# Patient Record
Sex: FEMALE | Race: BLACK OR AFRICAN AMERICAN | Employment: FULL TIME | URBAN - METROPOLITAN AREA
[De-identification: names, ages, dates, MRNs, and addresses within clinical notes are randomized per-mention and may not be internally consistent; named-entity substitution may affect disease eponyms.]

---

## 2017-01-16 DIAGNOSIS — F98.8 ADD (ATTENTION DEFICIT DISORDER): ICD-10-CM

## 2017-01-16 RX ORDER — DEXTROAMPHETAMINE SACCHARATE, AMPHETAMINE ASPARTATE, DEXTROAMPHETAMINE SULFATE AND AMPHETAMINE SULFATE 5; 5; 5; 5 MG/1; MG/1; MG/1; MG/1
20 TABLET ORAL 2 TIMES DAILY
Qty: 60 TAB | Refills: 0 | Status: SHIPPED | OUTPATIENT
Start: 2017-01-16 | End: 2017-02-20 | Stop reason: SDUPTHER

## 2017-01-16 NOTE — LETTER
1/16/2017 9:46 AM 
 
Ms. Asad Keene [address] [phone] [id number] Effective January 1, 4346 a new policy will be implemented by [address] [place] [address] for patients who are receiving controlled stimulant medications (ie Adderall, Vyvanse, Ritalin, etc). While these medications are intended to help individuals with ADD and ADHD manage their symptoms, they have potential negative side effects. These side effects may include but are not limited to a rapid heart rate, high blood pressure, weight loss, dependency and abuse. Since these medications are regulated by the Drug Enforcement Agency [facility]) and given a rise in abuse of the medications across the nation, we are obligated to monitor the use of these medications more closely. To continue ADD management in the clinic, each patient must have completed neuropsychological testing by a psychiatrist or psychologist and have included as part of their medical record. Individuals who were diagnosed prior to age 25 will have to be retested as an adult. In addition, all patients receiving controlled medications will be required to sign a controlled substance agreement. This is an agreement between patient and provider to ensure compliance with treatment. It also includes urine drug screens, maximum dosing limits for medications and follow up appointments every three months for prescription refills. We understand these changes may be inconvenient or frustrating, but they follow current clinical and CDC guidelines and are intended to improve patient care and your safety. Please address any questions or concerns with your provider at your next visit. Thank you for entrusting your healthcare in our hands. [address] [place] [address] Providers Sincerely, 
 
 
Higinio Pond NP

## 2017-01-16 NOTE — TELEPHONE ENCOUNTER
Margaret Paulson, [facility] is requesting a refill of adderall. 72 hour rule was given.

## 2017-02-20 DIAGNOSIS — F98.8 ADD (ATTENTION DEFICIT DISORDER): ICD-10-CM

## 2017-02-20 RX ORDER — DEXTROAMPHETAMINE SACCHARATE, AMPHETAMINE ASPARTATE, DEXTROAMPHETAMINE SULFATE AND AMPHETAMINE SULFATE 5; 5; 5; 5 MG/1; MG/1; MG/1; MG/1
20 TABLET ORAL 2 TIMES DAILY
Qty: 60 TAB | Refills: 0 | Status: SHIPPED | OUTPATIENT
Start: 2017-02-20 | End: 2017-03-17 | Stop reason: SDUPTHER

## 2017-02-20 NOTE — TELEPHONE ENCOUNTER
----- Message from [employer] sent at 2/20/2017  9:34 AM EST -----  Regarding: MALGORZATA Klein   Pt is requesting prescription for Adderall, she stated she is completely out. The best contact number is [phone].

## 2017-03-17 ENCOUNTER — TELEPHONE (OUTPATIENT)
Dept: FAMILY MEDICINE CLINIC | Age: 27
End: 2017-03-17

## 2017-03-17 DIAGNOSIS — F98.8 ADD (ATTENTION DEFICIT DISORDER): ICD-10-CM

## 2017-03-17 NOTE — TELEPHONE ENCOUNTER
Refill request:   Requested Prescriptions     Pending Prescriptions Disp Refills    dextroamphetamine-amphetamine (ADDERALL) 20 mg tablet 60 Tab 0     Sig: Take 1 Tab (20 mg total) by mouth two (2) times a day.

## 2017-03-17 NOTE — TELEPHONE ENCOUNTER
Addie Garcia       [phone]    -  Patient called back stated she needs her medication , next avail appt is 4-3-2017 -   Patient would not accept that date-  Can she someone else to get her Adderall

## 2017-03-20 RX ORDER — DEXTROAMPHETAMINE SACCHARATE, AMPHETAMINE ASPARTATE, DEXTROAMPHETAMINE SULFATE AND AMPHETAMINE SULFATE 5; 5; 5; 5 MG/1; MG/1; MG/1; MG/1
20 TABLET ORAL 2 TIMES DAILY
Qty: 60 TAB | Refills: 0 | Status: SHIPPED | OUTPATIENT
Start: 2017-03-20 | End: 2017-04-10 | Stop reason: SDUPTHER

## 2017-03-21 DIAGNOSIS — F98.8 ADD (ATTENTION DEFICIT DISORDER): ICD-10-CM

## 2017-03-21 RX ORDER — DEXTROAMPHETAMINE SACCHARATE, AMPHETAMINE ASPARTATE, DEXTROAMPHETAMINE SULFATE AND AMPHETAMINE SULFATE 5; 5; 5; 5 MG/1; MG/1; MG/1; MG/1
20 TABLET ORAL 2 TIMES DAILY
Qty: 60 TAB | Refills: 0 | Status: CANCELLED | OUTPATIENT
Start: 2017-03-21

## 2017-03-21 NOTE — TELEPHONE ENCOUNTER
[facility]  [phone]    Patient has scheduled an appointment with MALGORZATA Huizar on 04/06/17. She is requesting that her adderral prescription be approved.

## 2017-04-10 ENCOUNTER — OFFICE VISIT (OUTPATIENT)
Dept: FAMILY MEDICINE CLINIC | Age: 27
End: 2017-04-10

## 2017-04-10 VITALS
TEMPERATURE: 97.8 F | HEIGHT: 64 IN | DIASTOLIC BLOOD PRESSURE: 91 MMHG | SYSTOLIC BLOOD PRESSURE: 145 MMHG | WEIGHT: 238.2 LBS | RESPIRATION RATE: 18 BRPM | OXYGEN SATURATION: 100 % | BODY MASS INDEX: 40.67 KG/M2 | HEART RATE: 92 BPM

## 2017-04-10 DIAGNOSIS — F90.9 ATTENTION DEFICIT HYPERACTIVITY DISORDER (ADHD), UNSPECIFIED ADHD TYPE: Primary | ICD-10-CM

## 2017-04-10 DIAGNOSIS — F41.1 GAD (GENERALIZED ANXIETY DISORDER): ICD-10-CM

## 2017-04-10 DIAGNOSIS — G43.109 MIGRAINE WITH AURA AND WITHOUT STATUS MIGRAINOSUS, NOT INTRACTABLE: ICD-10-CM

## 2017-04-10 RX ORDER — DEXTROAMPHETAMINE SACCHARATE, AMPHETAMINE ASPARTATE, DEXTROAMPHETAMINE SULFATE AND AMPHETAMINE SULFATE 5; 5; 5; 5 MG/1; MG/1; MG/1; MG/1
20 TABLET ORAL 2 TIMES DAILY
Qty: 60 TAB | Refills: 0 | Status: SHIPPED | OUTPATIENT
Start: 2017-04-10 | End: 2017-05-19 | Stop reason: SDUPTHER

## 2017-04-10 RX ORDER — NORTRIPTYLINE HYDROCHLORIDE 10 MG/1
10 CAPSULE ORAL
Qty: 30 CAP | Refills: 5 | Status: SHIPPED | OUTPATIENT
Start: 2017-04-10 | End: 2017-11-02 | Stop reason: SINTOL

## 2017-04-10 NOTE — MR AVS SNAPSHOT
Visit Information Date & Time Provider Department Dept. Phone Encounter #  
 4/10/2017  5:15 PM Lamonte Delvalle, [address] [phone] [id number] Upcoming Health Maintenance Date Due Pneumococcal 19-64 Medium Risk (1 of 1 - PPSV23) 4/3/2009 DTaP/Tdap/Td series (1 - Tdap) 4/3/2011 PAP AKA CERVICAL CYTOLOGY 9/26/2019 Allergies as of 4/10/2017  Review Complete On: 4/10/2017 By: Lamonte Delvalle NP Severity Noted Reaction Type Reactions Codeine  09/27/2012    Shortness of Breath, Nausea and Vomiting  
 sweats Current Immunizations  Never Reviewed No immunizations on file. Not reviewed this visit You Were Diagnosed With   
  
 Codes Comments Attention deficit hyperactivity disorder (ADHD), unspecified ADHD type    -  Primary ICD-10-CM: F90.9 ICD-9-CM: 314.01 Migraine with aura and without status migrainosus, not intractable     ICD-10-CM: G43.109 ICD-9-CM: 346.00   
 JAQUAN (generalized anxiety disorder)     ICD-10-CM: F41.1 ICD-9-CM: 300.02 Vitals BP Pulse Temp Resp Height(growth percentile) Weight(growth percentile) (!) 145/91 (BP 1 Location: Left arm, BP Patient Position: Sitting) 92 97.8 °F (36.6 °C) (Oral) 18 5' 4\" (1.626 m) 238 lb 3.2 oz (108 kg) LMP SpO2 BMI OB Status Smoking Status 04/05/2017 (Exact Date) 100% 40.89 kg/m2 Having regular periods Current Some Day Smoker Vitals History BMI and BSA Data Body Mass Index Body Surface Area  
 40.89 kg/m 2 2.21 m 2 Preferred Pharmacy Pharmacy Name Phone [id number], [address] [facility] [phone] Your Updated Medication List  
  
   
This list is accurate as of: 4/10/17  5:55 PM.  Always use your most recent med list.  
  
  
  
  
 aluminum chloride 20 % external solution Commonly known as:  HYPERCARE Apply to affected areas twice daily as needed butalbital-acetaminophen-caffeine -40 mg per tablet Commonly known as:  FIORICET, ESGIC  
TAKE 1 TABLET BY MOUTH EVERY 4 HOURS AS NEEDED FOR PAIN  
  
 dextroamphetamine-amphetamine 20 mg tablet Commonly known as:  ADDERALL Take 1 Tab (20 mg total) by mouth two (2) times a day. Do not fill before 4/20/17. fluconazole 150 mg tablet Commonly known as:  DIFLUCAN  
TAKE 1 TABLET ONE TIME DAILY FOR 1 DAY MULTI-VITAMIN PO Take  by mouth daily. norgestimate-ethinyl estradiol 0.18/0.215/0.25 mg-35 mcg (28) Tab Commonly known as:  TRI-SPRINTEC (28) Take 1 Tab by mouth daily. nortriptyline 10 mg capsule Commonly known as:  PAMELOR Take 1 Cap by mouth nightly. Prescriptions Printed Refills  
 dextroamphetamine-amphetamine (ADDERALL) 20 mg tablet 0 Sig: Take 1 Tab (20 mg total) by mouth two (2) times a day. Do not fill before 4/20/17. Class: Print Route: Oral  
  
Prescriptions Sent to Pharmacy Refills  
 nortriptyline (PAMELOR) 10 mg capsule 5 Sig: Take 1 Cap by mouth nightly. Class: Normal  
 Pharmacy: [address] [place] [address].  #: [phone] Route: Oral  
  
We Performed the Following [address] [place] [address] MONITORING [[id number] Custom] Introducing [facility] & HEALTH SERVICES! [facility] introduces [employer] patient portal. Now you can access parts of your medical record, email your doctor's office, and request medication refills online. 1. In your internet browser, go to [email or web address] 2. Click on the First Time User? Click Here link in the Sign In box. You will see the New Member Sign Up page. 3. Enter your [employer] Access Code exactly as it appears below. You will not need to use this code after youve completed the sign-up process. If you do not sign up before the expiration date, you must request a new code. · [employer] Access Code: [email or web address] Expires: 7/9/2017  5:44 PM 
 
4. Enter the last four digits of your Social Security Number (xxxx) and Date of Birth (mm/dd/yyyy) as indicated and click Submit. You will be taken to the next sign-up page. 5. Create a [employer] ID. This will be your [employer] login ID and cannot be changed, so think of one that is secure and easy to remember. 6. Create a [employer] password. You can change your password at any time. 7. Enter your Password Reset Question and Answer. This can be used at a later time if you forget your password. 8. Enter your e-mail address. You will receive e-mail notification when new information is available in [address] 9. Click Sign Up. You can now view and download portions of your medical record. 10. Click the Download Summary menu link to download a portable copy of your medical information. If you have questions, please visit the Frequently Asked Questions section of the [employer] website. Remember, [employer] is NOT to be used for urgent needs. For medical emergencies, dial 911. Now available from your iPhone and Android! Please provide this summary of care documentation to your next provider. Your primary care clinician is listed as Antwan Hansen. If you have any questions after today's visit, please call [phone].

## 2017-04-10 NOTE — PROGRESS NOTES
HISTORY OF PRESENT ILLNESS  Joshua Meyers is a 32 y.o. female. HPI  Follow up chronic health problems. ADHD. Doing well on current dose of adderall. 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 Adderall helps to stay on task and focus on studies. JAQUAN and frequent migraine headaches. Began nortriptyline 5 months ago. Reports headache frequency has decreased and she has felt improvement in stress and anxiety. 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 Do not fill before 4/20/17.  nortriptyline (PAMELOR) 10 mg capsule Take 1 Cap by mouth nightly.  norgestimate-ethinyl estradiol (TRI-SPRINTEC, 28,) 0.18/0.215/0.25 mg-35 mcg (28) tab Take 1 Tab by mouth daily.  aluminum chloride (HYPERCARE) 20 % external solution Apply to affected areas twice daily as needed    butalbital-acetaminophen-caffeine (FIORICET, ESGIC) -40 mg per tablet TAKE 1 TABLET BY MOUTH EVERY 4 HOURS AS NEEDED FOR PAIN    fluconazole (DIFLUCAN) 150 mg tablet TAKE 1 TABLET ONE TIME DAILY FOR 1 DAY    MULTIVITAMINS WITH FLUORIDE (MULTI-VITAMIN PO) Take  by mouth daily. No current facility-administered medications for this visit. Social History   Substance Use Topics    Smoking status: Current Some Day Smoker     Packs/day: 0.30     Years: 4.00     Types: Cigarettes    Smokeless tobacco: Never Used    Alcohol use No     Review of Systems   Eyes: Negative. Respiratory: Negative for cough and shortness of breath. Cardiovascular: Negative for chest pain, palpitations and leg swelling. Neurological: Positive for headaches (improved). Negative for dizziness and sensory change. Psychiatric/Behavioral: Negative for depression, substance abuse and suicidal ideas. The patient is nervous/anxious (improved). All other systems reviewed and are negative. Physical Exam   Constitutional: No distress. Neck: Neck supple. Cardiovascular: Normal rate, regular rhythm and normal heart sounds. Pulmonary/Chest: Effort normal and breath sounds normal.   Abdominal: Soft. She exhibits no distension. There is no tenderness. There is no guarding. Musculoskeletal: She exhibits no edema. Lymphadenopathy:     She has no cervical adenopathy. Neurological: She is alert. Skin: Skin is warm and dry. Psychiatric: She has a normal mood and affect. Her behavior is normal. Thought content normal.     Visit Vitals    BP (!) 145/91 (BP 1 Location: Left arm, BP Patient Position: Sitting)    Pulse 92    Temp 97.8 °F (36.6 °C) (Oral)    Resp 18    Ht 5' 4\" (1.626 m)    Wt 238 lb 3.2 oz (108 kg)    SpO2 100%    BMI 40.89 kg/m2       ASSESSMENT and PLAN  [facility] was seen today for attention deficit disorder. Diagnoses and all orders for this visit:    Attention deficit hyperactivity disorder (ADHD), unspecified ADHD type  -     COMPLIANCE DRUG SCREEN/PRESCRIPTION MONITORING  -     dextroamphetamine-amphetamine (ADDERALL) 20 mg tablet; Take 1 Tab (20 mg total) by mouth two (2) times a day. Do not fill before 4/20/17. Symptoms stable on adderall. Patient informed of office policy for controlled substance management including  review and periodic urine drug screen. She is in agreement with policy, contract signed and scanned. Urine drug screen sent. She declined getting 3 months worth of prescription, concerned about misplacing. Will come in monthly for rx. Migraine with aura and without status migrainosus, not intractable  -     nortriptyline (PAMELOR) 10 mg capsule;  Take 1 Cap by mouth nightly. Improved on nortriptyline. JAQUAN (generalized anxiety disorder)  -     nortriptyline (PAMELOR) 10 mg capsule; Take 1 Cap by mouth nightly. Symptoms improved. Follow up 3 months or sooner prn.

## 2017-04-10 NOTE — PROGRESS NOTES
Chief Complaint   Patient presents with    Attention Deficit Disorder     medication refill     1. Have you been to the ER, urgent care clinic since your last visit? Hospitalized since your last visit? No    2. Have you seen or consulted any other health care providers outside of the [address] [place] [address] since your last visit? Include any pap smears or colon screening.  No

## 2017-04-13 LAB — DRUGS UR: NORMAL

## 2017-05-15 LAB
ANTIBODY SCREEN, EXTERNAL: NEGATIVE
CHLAMYDIA, EXTERNAL: NEGATIVE
HBSAG, EXTERNAL: NEGATIVE
HIV, EXTERNAL: NON REACTIVE
N. GONORRHEA, EXTERNAL: NEGATIVE
RUBELLA, EXTERNAL: NORMAL
T. PALLIDUM, EXTERNAL: NEGATIVE
TYPE, ABO & RH, EXTERNAL: NORMAL

## 2017-05-19 ENCOUNTER — TELEPHONE (OUTPATIENT)
Dept: FAMILY MEDICINE CLINIC | Age: 27
End: 2017-05-19

## 2017-05-19 DIAGNOSIS — F90.9 ATTENTION DEFICIT HYPERACTIVITY DISORDER (ADHD), UNSPECIFIED ADHD TYPE: ICD-10-CM

## 2017-05-19 RX ORDER — DEXTROAMPHETAMINE SACCHARATE, AMPHETAMINE ASPARTATE, DEXTROAMPHETAMINE SULFATE AND AMPHETAMINE SULFATE 5; 5; 5; 5 MG/1; MG/1; MG/1; MG/1
20 TABLET ORAL 2 TIMES DAILY
Qty: 60 TAB | Refills: 0 | Status: SHIPPED | OUTPATIENT
Start: 2017-05-19 | End: 2017-06-19 | Stop reason: SDUPTHER

## 2017-05-19 NOTE — TELEPHONE ENCOUNTER
[phone]    Patient is requesting a refill on medication:  Requested Prescriptions     Pending Prescriptions Disp Refills    dextroamphetamine-amphetamine (ADDERALL) 20 mg tablet 60 Tab 0     Sig: Take 1 Tab (20 mg total) by mouth two (2) times a day. Do not fill before 4/20/17. Patient is requesting it be filled today; informed patient NP Florin Santiago was not in office.

## 2017-05-19 NOTE — TELEPHONE ENCOUNTER
ADRIANETCB with patient and patient's mother. Patient needs to re-schedule her appointment today for FMLA forms if it is for continuing issues. Jessee Fuentes has been managing her care, and she needs to have her continuing care with her PCP. Please forward message to nurse if un-available to have patient scheduled sooner rather than later.

## 2017-05-22 ENCOUNTER — OFFICE VISIT (OUTPATIENT)
Dept: FAMILY MEDICINE CLINIC | Age: 27
End: 2017-05-22

## 2017-05-22 VITALS
HEART RATE: 82 BPM | BODY MASS INDEX: 39.95 KG/M2 | SYSTOLIC BLOOD PRESSURE: 129 MMHG | HEIGHT: 64 IN | RESPIRATION RATE: 16 BRPM | TEMPERATURE: 97.4 F | WEIGHT: 234 LBS | DIASTOLIC BLOOD PRESSURE: 73 MMHG

## 2017-05-22 DIAGNOSIS — G43.009 MIGRAINE WITHOUT AURA AND WITHOUT STATUS MIGRAINOSUS, NOT INTRACTABLE: ICD-10-CM

## 2017-05-22 DIAGNOSIS — R61 HYPERHIDROSIS: Primary | ICD-10-CM

## 2017-05-22 RX ORDER — BUTALBITAL, ACETAMINOPHEN AND CAFFEINE 50; 325; 40 MG/1; MG/1; MG/1
TABLET ORAL
Qty: 30 TAB | Refills: 0 | Status: SHIPPED | OUTPATIENT
Start: 2017-05-22 | End: 2017-09-06 | Stop reason: SDUPTHER

## 2017-05-22 NOTE — PROGRESS NOTES
Chief Complaint   Patient presents with    Migraine     fill out fmla form        Reviewed Record in preparation for visit and have obtained necessary documentation. Identified pt with two pt identifiers (Name @ )    There are no preventive care reminders to display for this patient. 1. Have you been to the ER, urgent care clinic since your last visit? Hospitalized since your last visit? No    2. Have you seen or consulted any other health care providers outside of the [address] [place] [address] since your last visit? Include any pap smears or colon screening.  No

## 2017-05-22 NOTE — MR AVS SNAPSHOT
Visit Information Date & Time Provider Department Dept. Phone Encounter #  
 5/22/2017  9:45 AM Lou Rosario  [facility] [phone] [id number] Upcoming Health Maintenance Date Due INFLUENZA AGE 9 TO ADULT 8/1/2017 PAP AKA CERVICAL CYTOLOGY 9/26/2019 DTaP/Tdap/Td series (2 - Td) 4/10/2027 Allergies as of 5/22/2017  Review Complete On: 5/22/2017 By: Lou Rosario NP Severity Noted Reaction Type Reactions Codeine  09/27/2012    Shortness of Breath, Nausea and Vomiting  
 sweats Current Immunizations  Never Reviewed No immunizations on file. Not reviewed this visit You Were Diagnosed With   
  
 Codes Comments Migraine without aura and without status migrainosus, not intractable     ICD-10-CM: T67.718 ICD-9-CM: 346.10 Vitals BP Pulse Temp Resp Height(growth percentile) Weight(growth percentile) 129/73 (BP 1 Location: Left arm, BP Patient Position: Sitting) 82 97.4 °F (36.3 °C) (Oral) 16 5' 4\" (1.626 m) 234 lb (106.1 kg) LMP BMI OB Status Smoking Status 05/13/2017 40.17 kg/m2 Having regular periods Current Some Day Smoker Vitals History BMI and BSA Data Body Mass Index Body Surface Area  
 40.17 kg/m 2 2.19 m 2 Preferred Pharmacy [facility] Name Phone [id number], [address] [facility]. [phone] Your Updated Medication List  
  
   
This list is accurate as of: 5/22/17 10:32 AM.  Always use your most recent med list.  
  
  
  
  
 aluminum chloride 20 % external solution Commonly known as:  HYPERCARE Apply to affected areas twice daily as needed  
  
 butalbital-acetaminophen-caffeine -40 mg per tablet Commonly known as:  FIORICET, ESGIC  
TAKE 1 TABLET BY MOUTH EVERY 4 HOURS AS NEEDED FOR PAIN  
  
 dextroamphetamine-amphetamine 20 mg tablet Commonly known as:  ADDERALL Take 1 Tab (20 mg total) by mouth two (2) times a dayEarliest Fill Date: 5/19/17. Do not fill before 4/20/17. MULTI-VITAMIN PO Take  by mouth daily. norgestimate-ethinyl estradiol 0.18/0.215/0.25 mg-35 mcg (28) Tab Commonly known as:  TRI-SPRINTEC (28) Take 1 Tab by mouth daily. nortriptyline 10 mg capsule Commonly known as:  PAMELOR Take 1 Cap by mouth nightly. Prescriptions Printed Refills  
 butalbital-acetaminophen-caffeine (FIORICET, ESGIC) -40 mg per tablet 0 Sig: TAKE 1 TABLET BY MOUTH EVERY 4 HOURS AS NEEDED FOR PAIN Class: Print Introducing [facility] & HEALTH SERVICES! [facility] introduces [employer] patient portal. Now you can access parts of your medical record, email your doctor's office, and request medication refills online. 1. In your internet browser, go to [email or web address] 2. Click on the First Time User? Click Here link in the Sign In box. You will see the New Member Sign Up page. 3. Enter your [employer] Access Code exactly as it appears below. You will not need to use this code after youve completed the sign-up process. If you do not sign up before the expiration date, you must request a new code. · [employer] Access Code: [email or web address] Expires: 7/9/2017  5:44 PM 
 
4. Enter the last four digits of your Social Security Number (xxxx) and Date of Birth (mm/dd/yyyy) as indicated and click Submit. You will be taken to the next sign-up page. 5. Create a [employer] ID. This will be your [employer] login ID and cannot be changed, so think of one that is secure and easy to remember. 6. Create a [employer] password. You can change your password at any time. 7. Enter your Password Reset Question and Answer. This can be used at a later time if you forget your password. 8. Enter your e-mail address. You will receive e-mail notification when new information is available in [address]. 9. Click Sign Up. You can now view and download portions of your medical record. 10. Click the Download Summary menu link to download a portable copy of your medical information. If you have questions, please visit the Frequently Asked Questions section of the [employer] website. Remember, [employer] is NOT to be used for urgent needs. For medical emergencies, dial 911. Now available from your iPhone and Android! Please provide this summary of care documentation to your next provider. Your primary care clinician is listed as Dylan Christian. If you have any questions after today's visit, please call [phone].

## 2017-05-22 NOTE — PROGRESS NOTES
HISTORY OF PRESENT ILLNESS  Kirby Mahmood is a 32 y.o. female. HPI  Presents for follow up migraine headaches and brings FMLA papers to be completed to cover time missed due to headaches. Headaches occur about 4-5 x month lasting for about 24 hours. No aura. Sometimes has photophobia and nausea. Usually left sided in occipital region. Sometimes takes aleve and that is successful. Uses fioricet for more severe sx with good effect. Requesting refill on hypercare for excessive perspiration. Uses 1-2 x day with good effect. 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    Migraine G43.909    JAQUAN (generalized anxiety disorder) F41.1    Elevated BP [id number]    Attention deficit hyperactivity disorder (ADHD) F90.9     Past Medical History:   Diagnosis Date         Acne 3/3/2005    ADD (attention deficit disorder) since 12yo    Allergic rhinitis     Granuloma faciale     Excised    Hyperhydrosis disorder     Axillary    Migraines    Jesus Epperson Seen   [facility] tonsillectomy and adenoidectomy      Current Outpatient Prescriptions   Medication Sig    butalbital-acetaminophen-caffeine (FIORICET, ESGIC) -40 mg per tablet TAKE 1 TABLET BY MOUTH EVERY 4 HOURS AS NEEDED FOR PAIN    aluminum chloride (HYPERCARE) 20 % external solution Apply to affected areas twice daily as needed    dextroamphetamine-amphetamine (ADDERALL) 20 mg tablet Take 1 Tab (20 mg total) by mouth two (2) times a dayEarliest Fill Date: 17. Do not fill before 17.  nortriptyline (PAMELOR) 10 mg capsule Take 1 Cap by mouth nightly.  norgestimate-ethinyl estradiol (TRI-SPRINTEC, 28,) 0.18/0.215/0.25 mg-35 mcg (28) tab Take 1 Tab by mouth daily.  MULTIVITAMINS WITH FLUORIDE (MULTI-VITAMIN PO) Take  by mouth daily. No current facility-administered medications for this visit.       Social History Substance Use Topics    Smoking status: Current Some Day Smoker     Packs/day: 0.30     Years: 4.00     Types: Cigarettes    Smokeless tobacco: Never Used    Alcohol use No     Visit Vitals    /73 (BP 1 Location: Left arm, BP Patient Position: Sitting)    Pulse 82    Temp 97.4 °F (36.3 °C) (Oral)    Resp 16    Ht 5' 4\" (1.626 m)    Wt 234 lb (106.1 kg)    BMI 40.17 kg/m2       Review of Systems   Constitutional: Negative for chills, fever and malaise/fatigue. Eyes: Positive for photophobia (at times with migraine). Negative for blurred vision and double vision. Respiratory: Negative. Cardiovascular: Negative for chest pain. Gastrointestinal: Negative. Skin:        Excessive perspiration. Neurological: Positive for headaches. All other systems reviewed and are negative. Physical Exam   Constitutional: No distress. Neck: Neck supple. Cardiovascular: Normal rate, regular rhythm and normal heart sounds. Pulmonary/Chest: Effort normal and breath sounds normal.   Abdominal: Soft. She exhibits no distension. There is no tenderness. There is no guarding. Musculoskeletal: She exhibits no edema. Lymphadenopathy:     She has no cervical adenopathy. Neurological: She is alert. No cranial nerve deficit. Coordination normal.   Skin: Skin is warm and dry. Psychiatric: She has a normal mood and affect. ASSESSMENT and [facility] was seen today for migraine. Diagnoses and all orders for this visit:    Hyperhidrosis  -     aluminum chloride (HYPERCARE) 20 % external solution; Apply to affected areas twice daily as needed  Stable on hypercare. Continue med. Migraine without aura and without status migrainosus, not intractable  -     butalbital-acetaminophen-caffeine (FIORICET, ESGIC) -40 mg per tablet; TAKE 1 TABLET BY MOUTH EVERY 4 HOURS AS NEEDED FOR PAIN  Stable. Continue current treatment. Will complete FMLA forms and fax to employer.     Follow up 6 months or sooner prn.

## 2017-05-24 ENCOUNTER — TELEPHONE (OUTPATIENT)
Dept: FAMILY MEDICINE CLINIC | Age: 27
End: 2017-05-24

## 2017-05-24 NOTE — TELEPHONE ENCOUNTER
----- Message from Samson Dequan sent at 5/24/2017  2:03 PM EDT -----  Regarding: Tammy Sutherland/Telephone  Patient was in on 5/22/17 to turn in [facility] paperwork. Advises that her employer is still waiting on paperwork to be faxed over from the practice. Is asking that paperwork be faxed over as soon as possible. Best contact number for patient is [phone]. Fax number is [phone]. Please reference claim number [id number].

## 2017-05-25 NOTE — TELEPHONE ENCOUNTER
Attempted to reach patient by phone unable to leave message. Consulted with provider. Forms faxed 5/25/2017.

## 2017-05-25 NOTE — TELEPHONE ENCOUNTER
Contact # is [phone]    Patient called to check status of paperwork being filled out.  Patient will be faxing over updated paperwork for NP Koko to fill out and fax in

## 2017-06-19 DIAGNOSIS — F90.9 ATTENTION DEFICIT HYPERACTIVITY DISORDER (ADHD), UNSPECIFIED ADHD TYPE: ICD-10-CM

## 2017-06-19 RX ORDER — DEXTROAMPHETAMINE SACCHARATE, AMPHETAMINE ASPARTATE, DEXTROAMPHETAMINE SULFATE AND AMPHETAMINE SULFATE 5; 5; 5; 5 MG/1; MG/1; MG/1; MG/1
20 TABLET ORAL 2 TIMES DAILY
Qty: 60 TAB | Refills: 0 | Status: SHIPPED | OUTPATIENT
Start: 2017-06-19 | End: 2017-07-17 | Stop reason: SDUPTHER

## 2017-07-17 DIAGNOSIS — F90.9 ATTENTION DEFICIT HYPERACTIVITY DISORDER (ADHD), UNSPECIFIED ADHD TYPE: ICD-10-CM

## 2017-07-17 RX ORDER — DEXTROAMPHETAMINE SACCHARATE, AMPHETAMINE ASPARTATE, DEXTROAMPHETAMINE SULFATE AND AMPHETAMINE SULFATE 5; 5; 5; 5 MG/1; MG/1; MG/1; MG/1
20 TABLET ORAL 2 TIMES DAILY
Qty: 60 TAB | Refills: 0 | Status: SHIPPED | OUTPATIENT
Start: 2017-07-17 | End: 2017-08-15 | Stop reason: SDUPTHER

## 2017-07-17 NOTE — TELEPHONE ENCOUNTER
Contact # is     Patient is requesting a refill on medication:  Requested Prescriptions     Pending Prescriptions Disp Refills    dextroamphetamine-amphetamine (ADDERALL) 20 mg tablet 60 Tab 0     Sig: Take 1 Tab (20 mg total) by mouth two (2) times a day. Do not fill before 4/20/17.

## 2017-08-15 DIAGNOSIS — F90.9 ATTENTION DEFICIT HYPERACTIVITY DISORDER (ADHD), UNSPECIFIED ADHD TYPE: ICD-10-CM

## 2017-08-15 NOTE — TELEPHONE ENCOUNTER
Irving Fernandes, [address] [place] [address] Dr Yoon is requesting an adderall refill. 72 hour rule given.

## 2017-08-16 RX ORDER — DEXTROAMPHETAMINE SACCHARATE, AMPHETAMINE ASPARTATE, DEXTROAMPHETAMINE SULFATE AND AMPHETAMINE SULFATE 5; 5; 5; 5 MG/1; MG/1; MG/1; MG/1
20 TABLET ORAL 2 TIMES DAILY
Qty: 60 TAB | Refills: 0 | Status: SHIPPED | OUTPATIENT
Start: 2017-08-16 | End: 2017-09-12 | Stop reason: SDUPTHER

## 2017-08-16 NOTE — TELEPHONE ENCOUNTER
----- Message from Sloan Stringer sent at 8/15/2017  7:09 PM EDT -----  Regarding: NP, [place]/Refill  Hello,    Pt is inquiring if her prescription for \"Adderall\" was ready to be picked up. Best contact number is [phone].     Thanks,  Nara Elliott

## 2017-09-06 DIAGNOSIS — G43.009 MIGRAINE WITHOUT AURA AND WITHOUT STATUS MIGRAINOSUS, NOT INTRACTABLE: ICD-10-CM

## 2017-09-06 NOTE — TELEPHONE ENCOUNTER
Phone#[phone]    Pharmacy on file is correct    butalbital-acetaminophen-caffeine (FIORICET, ESGIC) -40 mg per tablet  TAKE 1 TABLET BY MOUTH EVERY 4 HOURS AS NEEDED FOR PAIN, Normal, Disp-30 Tab, R-0

## 2017-09-07 RX ORDER — BUTALBITAL, ACETAMINOPHEN AND CAFFEINE 50; 325; 40 MG/1; MG/1; MG/1
TABLET ORAL
Qty: 30 TAB | Refills: 1 | Status: SHIPPED | OUTPATIENT
Start: 2017-09-07 | End: 2018-01-23 | Stop reason: SDUPTHER

## 2017-09-12 ENCOUNTER — OFFICE VISIT (OUTPATIENT)
Dept: FAMILY MEDICINE CLINIC | Age: 27
End: 2017-09-12

## 2017-09-12 VITALS
TEMPERATURE: 97.7 F | OXYGEN SATURATION: 98 % | WEIGHT: 231.8 LBS | BODY MASS INDEX: 39.57 KG/M2 | HEART RATE: 89 BPM | HEIGHT: 64 IN | RESPIRATION RATE: 18 BRPM | SYSTOLIC BLOOD PRESSURE: 126 MMHG | DIASTOLIC BLOOD PRESSURE: 72 MMHG

## 2017-09-12 DIAGNOSIS — F90.9 ATTENTION DEFICIT HYPERACTIVITY DISORDER (ADHD), UNSPECIFIED ADHD TYPE: ICD-10-CM

## 2017-09-12 DIAGNOSIS — R68.83 CHILLS: Primary | ICD-10-CM

## 2017-09-12 DIAGNOSIS — R52 BODY ACHES: ICD-10-CM

## 2017-09-12 LAB
QUICKVUE INFLUENZA TEST: NEGATIVE
VALID INTERNAL CONTROL?: YES

## 2017-09-12 RX ORDER — BENZONATATE 100 MG/1
100 CAPSULE ORAL
Qty: 30 CAP | Refills: 0 | Status: SHIPPED | OUTPATIENT
Start: 2017-09-12 | End: 2017-09-22

## 2017-09-12 RX ORDER — DEXTROAMPHETAMINE SACCHARATE, AMPHETAMINE ASPARTATE, DEXTROAMPHETAMINE SULFATE AND AMPHETAMINE SULFATE 5; 5; 5; 5 MG/1; MG/1; MG/1; MG/1
20 TABLET ORAL 2 TIMES DAILY
Qty: 60 TAB | Refills: 0 | Status: SHIPPED | OUTPATIENT
Start: 2017-09-15 | End: 2017-10-13 | Stop reason: SDUPTHER

## 2017-09-12 RX ORDER — BENZONATATE 100 MG/1
100 CAPSULE ORAL
Qty: 30 CAP | Refills: 0 | Status: SHIPPED | OUTPATIENT
Start: 2017-09-12 | End: 2017-09-12 | Stop reason: SDUPTHER

## 2017-09-12 NOTE — PROGRESS NOTES
Chief Complaint   Patient presents with    Cough     started Thursday feeling tired, then started coughing Friday and having h/a, , cough with green mucus, congestion, chills , beleives fever but did not check. tried Nyquil and Tylenoy with no help       Reviewed Record in preparation for visit and have obtained necessary documentation. Identified pt with two pt identifiers (Name @ )    Health Maintenance Due   Topic    INFLUENZA AGE 9 TO ADULT          1. Have you been to the ER, urgent care clinic since your last visit? Hospitalized since your last visit? No    2. Have you seen or consulted any other health care providers outside of the [address] [place] [address] since your last visit? Include any pap smears or colon screening.  No

## 2017-09-12 NOTE — PROGRESS NOTES
Lior Echols [address] [place], [address] [place] [address] Road  [phone]             Date of visit: 9/12/2017   Subjective:      History obtained from:  the patient. Porsche Ramsay is a 32 y.o. female who presents today for congestion, nose pressure, headaches, fever, chills, fatigue, body aches, using fiorcet for headaches. bc advil aleve tylenol did not help. Had the headache for 3 days    Scratchy throat  Runny irritated eyes    Smokes 1/3ppd  Quit for 2 months, did vape then  Has not been smoking as much since she got sick. Needs a work note    Patient Active Problem List    Diagnosis Date Noted    Attention deficit hyperactivity disorder (ADHD) 04/10/2017    JAQUAN (generalized anxiety disorder) 09/26/2016    Elevated BP 09/26/2016    Migraine 04/03/2014    Hyperhidrosis     Granuloma faciale     Acne 03/03/2005     Current Outpatient Prescriptions   Medication Sig Dispense Refill    [START ON 9/15/2017] dextroamphetamine-amphetamine (ADDERALL) 20 mg tablet Take 1 Tab (20 mg total) by mouth two (2) times a dayEarliest Fill Date: 9/15/17. [facility] Tab 0    benzonatate (TESSALON) 100 mg capsule Take 1 Cap by mouth three (3) times daily as needed for Cough for up to 10 days. 30 Cap 0    butalbital-acetaminophen-caffeine (FIORICET, ESGIC) -40 mg per tablet TAKE 1 TABLET BY MOUTH EVERY 4 HOURS AS NEEDED FOR PAIN 30 Tab 1    aluminum chloride (HYPERCARE) 20 % external solution Apply to affected areas twice daily as needed 35 mL 0    nortriptyline (PAMELOR) 10 mg capsule Take 1 Cap by mouth nightly. 30 Cap 5    norgestimate-ethinyl estradiol (TRI-SPRINTEC, 28,) 0.18/0.215/0.25 mg-35 mcg (28) tab Take 1 Tab by mouth daily. 3 Package 3    MULTIVITAMINS WITH FLUORIDE (MULTI-VITAMIN PO) Take  by mouth daily.        Allergies   Allergen Reactions    Codeine Shortness of Breath and Nausea and Vomiting     sweats     Past Medical History:   Diagnosis Date         Acne 3/3/2005    ADD (attention deficit disorder) since 14yo    Allergic rhinitis     Granuloma faciale     Excised    Hyperhydrosis disorder 2005    Axillary    Migraines 2005    Miscarriage     Gyn Dr Vera Smith    S/P tonsillectomy and adenoidectomy      History reviewed. No pertinent surgical history. Family History   Problem Relation Age of Onset    Other Maternal Grandmother      bone spurs    Diabetes Paternal Grandmother     Cancer Paternal Grandfather      prostate     Social History   Substance Use Topics    Smoking status: Current Some Day Smoker     Packs/day: 0.30     Years: 4.00     Types: Cigarettes    Smokeless tobacco: Never Used    Alcohol use No      Social History     Social History Narrative    No narrative on file        Review of Systems  GI: denies nausea, vomiting, or diarrhea   Gen: admits fever       Objective:     Vitals:    17 1100   BP: 126/72   Pulse: 89   Resp: 18   Temp: 97.7 °F (36.5 °C)   TempSrc: Oral   SpO2: 98%   Weight: 231 lb 12.8 oz (105.1 kg)   Height: 5' 4\" (1.626 m)     Body mass index is 39.79 kg/(m^2). General: stated age, obese and in NAD, appears well  Neck: supple, symmetrical, trachea midline, no adenopathy and thyroid: not enlarged, symmetric, no tenderness/mass/nodules  Ears: TMs clear bilaterally, canals patent without inflammation  Nose: clear drainage, turbinates normal  Throat: clear, no tonsillar exudate or erthema  Mouth: no lesions noted  Lungs:  clear to auscultation w/o rales, rhonchi, wheezes w/normal effort and no use of accessory muscles of respiration   Heart: regular rate and rhythm, S1, S2 normal, no murmur, click, rub or gallop  Lymph: no cervical adenopathy appreciated  Ext: no edema  Skin:  No rashes or lesions      Assessment/Plan:       ICD-10-CM ICD-9-CM    1. Chills R68.83 780.64 AMB POC RAPID INFLUENZA TEST   2.  Attention deficit hyperactivity disorder (ADHD), unspecified ADHD type F90.9 314.01 dextroamphetamine-amphetamine (ADDERALL) 20 mg tablet   3. Body aches R52 780.96 AMB POC RAPID INFLUENZA TEST        Orders Placed This Encounter    AMB POC RAPID INFLUENZA TEST    dextroamphetamine-amphetamine (ADDERALL) 20 mg tablet    DISCONTD: benzonatate (TESSALON) 100 mg capsule    benzonatate (TESSALON) 100 mg capsule       Flu like illness but no known flu in community and her rapid test was neg  Supportive care advised  Try tessalon for cough  Reviewed worrisome signs or symptoms for which to call particularly of bacterial sinusitis    Discussed the diagnosis and plan and she expressed understanding. Follow-up Disposition:  Return in about 4 weeks (around 10/10/2017) for with pcp for ADD.     Hilario Medina MD

## 2017-09-12 NOTE — PATIENT INSTRUCTIONS

## 2017-09-12 NOTE — LETTER
NOTIFICATION RETURN TO WORK / SCHOOL 
 
9/12/2017 11:56 AM 
 
Ms. James Marshall [address] [address] [address] [place] [address] To Whom It May Concern: 
 
James Marshall is currently under the care of [facility]. She will return to work/school on: 9/14/17 If there are questions or concerns please have the patient contact our office. Sincerely, Aurora Nuñez MD

## 2017-09-12 NOTE — MR AVS SNAPSHOT
Visit Information Date & Time Provider Department Dept. Phone Encounter #  
 9/12/2017 10:30 AM Berto Calderon, [address] [phone] [id number] Follow-up Instructions Return in about 4 weeks (around 10/10/2017) for with pcp for ADD. Upcoming Health Maintenance Date Due INFLUENZA AGE 9 TO ADULT 8/1/2017 PAP AKA CERVICAL CYTOLOGY 9/26/2019 DTaP/Tdap/Td series (2 - Td) 4/10/2027 Allergies as of 9/12/2017  Review Complete On: 9/12/2017 By: Berto Calderon MD  
  
 Severity Noted Reaction Type Reactions Codeine  09/27/2012    Shortness of Breath, Nausea and Vomiting  
 sweats Current Immunizations  Never Reviewed No immunizations on file. Not reviewed this visit You Were Diagnosed With   
  
 Codes Comments Chills    -  Primary ICD-10-CM: R68.83 ICD-9-CM: 780.64 Attention deficit hyperactivity disorder (ADHD), unspecified ADHD type     ICD-10-CM: F90.9 ICD-9-CM: 314.01 Body aches     ICD-10-CM: R52 ICD-9-CM: 780.96 Vitals BP Pulse Temp Resp Height(growth percentile) Weight(growth percentile) 126/72 (BP 1 Location: Right arm, BP Patient Position: Sitting) 89 97.7 °F (36.5 °C) (Oral) 18 5' 4\" (1.626 m) 231 lb 12.8 oz (105.1 kg) LMP SpO2 BMI OB Status Smoking Status 09/03/2017 (Exact Date) 98% 39.79 kg/m2 Having regular periods Current Some Day Smoker Vitals History BMI and BSA Data Body Mass Index Body Surface Area  
 39.79 kg/m 2 2.18 m 2 Preferred Pharmacy Pharmacy Name Phone [id number] [address] [facility] [phone] Your Updated Medication List  
  
   
This list is accurate as of: 9/12/17 11:59 AM.  Always use your most recent med list.  
  
  
  
  
 aluminum chloride 20 % external solution Commonly known as:  HYPERCARE Apply to affected areas twice daily as needed  
  
 benzonatate 100 mg capsule Commonly known as:  TESSALON Take 1 Cap by mouth three (3) times daily as needed for Cough for up to 10 days. butalbital-acetaminophen-caffeine -40 mg per tablet Commonly known as:  FIORICET, ESGIC  
TAKE 1 TABLET BY MOUTH EVERY 4 HOURS AS NEEDED FOR PAIN  
  
 dextroamphetamine-amphetamine 20 mg tablet Commonly known as:  ADDERALL Take 1 Tab (20 mg total) by mouth two (2) times a dayEarliest Fill Date: 9/15/17. [facility] Start taking on:  9/15/2017 MULTI-VITAMIN PO Take  by mouth daily. norgestimate-ethinyl estradiol 0.18/0.215/0.25 mg-35 mcg (28) Tab Commonly known as:  TRI-SPRINTEC (28) Take 1 Tab by mouth daily. nortriptyline 10 mg capsule Commonly known as:  PAMELOR Take 1 Cap by mouth nightly. Prescriptions Printed Refills  
 dextroamphetamine-amphetamine (ADDERALL) 20 mg tablet 0 Starting on: 9/15/2017 Sig: Take 1 Tab (20 mg total) by mouth two (2) times a dayEarliest Fill Date: 9/15/17. [facility] Class: Print Route: Oral  
  
Prescriptions Sent to Pharmacy Refills  
 benzonatate (TESSALON) 100 mg capsule 0 Sig: Take 1 Cap by mouth three (3) times daily as needed for Cough for up to 10 days. Class: Normal  
 Pharmacy: [address] [place] [address] #: [phone] Route: Oral  
  
We Performed the Following AMB POC RAPID INFLUENZA TEST [63896 CPT(R)] Follow-up Instructions Return in about 4 weeks (around 10/10/2017) for with pcp for ADD. Patient Instructions Upper Respiratory Infection (Cold): Care Instructions Your Care Instructions An upper respiratory infection, or URI, is an infection of the nose, sinuses, or throat. URIs are spread by coughs, sneezes, and direct contact. The common cold is the most frequent kind of URI. The flu and sinus infections are other kinds of URIs. Almost all URIs are caused by viruses. Antibiotics won't cure them. But you can treat most infections with home care. This may include drinking lots of fluids and taking over-the-counter pain medicine. You will probably feel better in 4 to 10 days. The doctor has checked you carefully, but problems can develop later. If you notice any problems or new symptoms, get medical treatment right away. Follow-up care is a key part of your treatment and safety. Be sure to make and go to all appointments, and call your doctor if you are having problems. It's also a good idea to know your test results and keep a list of the medicines you take. How can you care for yourself at home? · To prevent dehydration, drink plenty of fluids, enough so that your urine is light yellow or clear like water. Choose water and other caffeine-free clear liquids until you feel better. If you have kidney, heart, or liver disease and have to limit fluids, talk with your doctor before you increase the amount of fluids you drink. · Take an over-the-counter pain medicine, such as acetaminophen (Tylenol), ibuprofen (Advil, Motrin), or naproxen (Aleve). Read and follow all instructions on the label. · Before you use cough and cold medicines, check the label. These medicines may not be safe for young children or for people with certain health problems. · Be careful when taking over-the-counter cold or flu medicines and Tylenol at the same time. Many of these medicines have acetaminophen, which is Tylenol. Read the labels to make sure that you are not taking more than the recommended dose. Too much acetaminophen (Tylenol) can be harmful. · Get plenty of rest. 
· Do not smoke or allow others to smoke around you. If you need help quitting, talk to your doctor about stop-smoking programs and medicines. These can increase your chances of quitting for good. When should you call for help? Call 911 anytime you think you may need emergency care. For example, call if: 
· You have severe trouble breathing. Call your doctor now or seek immediate medical care if: 
· You seem to be getting much sicker. · You have new or worse trouble breathing. · You have a new or higher fever. · You have a new rash. Watch closely for changes in your health, and be sure to contact your doctor if: 
· You have a new symptom, such as a sore throat, an earache, or sinus pain. · You cough more deeply or more often, especially if you notice more mucus or a change in the color of your mucus. · You do not get better as expected. Where can you learn more? Go to http://wanda-alden.info/. Enter X054 in the search box to learn more about \"Upper Respiratory Infection (Cold): Care Instructions. \" Current as of: March 25, 2017 Content Version: 11.3 © 8228-0725 [employer]. Care instructions adapted under license by dscovered (which disclaims liability or warranty for this information). If you have questions about a medical condition or this instruction, always ask your healthcare professional. [address] any warranty or liability for your use of this information. Introducing [facility]! Dear Karrie: Thank you for requesting a [employer] account. Our records indicate that you already have an active [employer] account. You can access your account anytime at [email or web address] Did you know that you can access your hospital and ER discharge instructions at any time in [employer]? You can also review all of your test results from your hospital stay or ER visit. Additional Information If you have questions, please visit the Frequently Asked Questions section of the [employer] website at [email or web address] Remember, [employer] is NOT to be used for urgent needs. For medical emergencies, dial 911. Now available from your iPhone and Android! Please provide this summary of care documentation to your next provider. Your primary care clinician is listed as Kostas Yash. If you have any questions after today's visit, please call [phone].

## 2017-09-18 ENCOUNTER — PATIENT MESSAGE (OUTPATIENT)
Dept: FAMILY MEDICINE CLINIC | Age: 27
End: 2017-09-18

## 2017-09-18 ENCOUNTER — TELEPHONE (OUTPATIENT)
Dept: FAMILY MEDICINE CLINIC | Age: 27
End: 2017-09-18

## 2017-09-19 RX ORDER — AMOXICILLIN AND CLAVULANATE POTASSIUM 875; 125 MG/1; MG/1
1 TABLET, FILM COATED ORAL 2 TIMES DAILY
Qty: 14 TAB | Refills: 0 | Status: SHIPPED | OUTPATIENT
Start: 2017-09-19 | End: 2017-09-26

## 2017-09-19 NOTE — TELEPHONE ENCOUNTER
From: Juan Daniel Wallis  Sent: 9/18/2017 5:11 PM EDT  To: Lara Boeck, MD  Subject: RE:more about your symptoms? Hey < 3       Thank you for getting back to me quickly . Heddie [place] ... I don't normally have allergies. When I was younger I was allergic to grass, however I guess I grew out of it . I have Flonase already, I have been using that at night time when I have sneeze attacks. I have also been using Vicks on my nose and chest to stop congestion. My nose runs but not like to the extreme. When I clear my throat I can taste like mucus trying to come up. I don't have face pain just a headache that can either make me dizzy or feel nauseous , which is making my anxiety skyrocket.   ----- Message -----  From: Lara Boeck, MD  Sent: 9/18/2017 4:46 PM EDT  To: Hu Pace  Subject: more about your symptoms? Raquel Alexander,  i'm sorry you still aren't feeling better. I wonder with the sneezing if this could be allergies--have you had fall allergies before? I think it would be good for you to try flonase (over the counter), which is proven to help allergies as well as sinus infections. Are you having any pain in your face or a large amount of drainage from your nose? Any fever or dizziness?  Lara Boeck, MD 9/18/2017 4:46 PM

## 2017-10-13 DIAGNOSIS — F90.9 ATTENTION DEFICIT HYPERACTIVITY DISORDER (ADHD), UNSPECIFIED ADHD TYPE: ICD-10-CM

## 2017-10-13 NOTE — TELEPHONE ENCOUNTER
From: Maci Moulton  To: Reshma Calderon MD  Sent: 10/13/2017 2:02 PM EDT  Subject: Medication Renewal Request    Original authorizing provider: MD Oma Joseph. Jessi Monteiro would like a refill of the following medications:  dextroamphetamine-amphetamine (ADDERALL) 20 mg tablet [YRRKBIW Whitney Villalobos MD]    Preferred pharmacy: [address] [place] [address] Dr    Comment:    No problem on , last filled 9/15.   Last seen 9/12/17 was due 1 month with PCP

## 2017-10-16 RX ORDER — DEXTROAMPHETAMINE SACCHARATE, AMPHETAMINE ASPARTATE, DEXTROAMPHETAMINE SULFATE AND AMPHETAMINE SULFATE 5; 5; 5; 5 MG/1; MG/1; MG/1; MG/1
20 TABLET ORAL 2 TIMES DAILY
Qty: 60 TAB | Refills: 0 | Status: SHIPPED | OUTPATIENT
Start: 2017-10-16 | End: 2017-11-02 | Stop reason: SDUPTHER

## 2017-11-02 ENCOUNTER — OFFICE VISIT (OUTPATIENT)
Dept: FAMILY MEDICINE CLINIC | Age: 27
End: 2017-11-02

## 2017-11-02 VITALS
HEIGHT: 64 IN | RESPIRATION RATE: 18 BRPM | SYSTOLIC BLOOD PRESSURE: 129 MMHG | WEIGHT: 240 LBS | HEART RATE: 94 BPM | TEMPERATURE: 98.6 F | OXYGEN SATURATION: 98 % | DIASTOLIC BLOOD PRESSURE: 76 MMHG | BODY MASS INDEX: 40.97 KG/M2

## 2017-11-02 DIAGNOSIS — F90.9 ATTENTION DEFICIT HYPERACTIVITY DISORDER (ADHD), UNSPECIFIED ADHD TYPE: Primary | ICD-10-CM

## 2017-11-02 DIAGNOSIS — G43.009 MIGRAINE WITHOUT AURA AND WITHOUT STATUS MIGRAINOSUS, NOT INTRACTABLE: ICD-10-CM

## 2017-11-02 DIAGNOSIS — Z23 ENCOUNTER FOR IMMUNIZATION: ICD-10-CM

## 2017-11-02 RX ORDER — DEXTROAMPHETAMINE SACCHARATE, AMPHETAMINE ASPARTATE, DEXTROAMPHETAMINE SULFATE AND AMPHETAMINE SULFATE 5; 5; 5; 5 MG/1; MG/1; MG/1; MG/1
20 TABLET ORAL 2 TIMES DAILY
Qty: 60 TAB | Refills: 0 | Status: SHIPPED | OUTPATIENT
Start: 2017-11-02 | End: 2018-02-01 | Stop reason: SDUPTHER

## 2017-11-02 RX ORDER — DEXTROAMPHETAMINE SACCHARATE, AMPHETAMINE ASPARTATE, DEXTROAMPHETAMINE SULFATE AND AMPHETAMINE SULFATE 5; 5; 5; 5 MG/1; MG/1; MG/1; MG/1
20 TABLET ORAL 2 TIMES DAILY
Qty: 60 TAB | Refills: 0 | Status: SHIPPED | OUTPATIENT
Start: 2017-11-02 | End: 2017-11-02 | Stop reason: SDUPTHER

## 2017-11-02 NOTE — PROGRESS NOTES
1. Have you been to the ER, urgent care clinic since your last visit? Hospitalized since your last visit? No    2. Have you seen or consulted any other health care providers outside of the [address] [place] [address] since your last visit? Include any pap smears or colon screening. No     Chief Complaint   Patient presents with    Medication Refill     patient is here for medication refill     Learning assessment complete  Abuse Screening Questionnaire 9/12/2017   Do you ever feel afraid of your partner? N   Are you in a relationship with someone who physically or mentally threatens you? N   Is it safe for you to go home? Y     Fall Risk Assessment, last 12 mths 9/12/2017   Able to walk? Yes   Fall in past 12 months?  No

## 2017-11-02 NOTE — PROGRESS NOTES
HISTORY OF PRESENT ILLNESS  Yusuf Silverio is a 32 y.o. female. HPI  Follow up health problems and med refills. ADHD. Doing well on current dose of adderall. Works FT at [employer] and goes to [employer] school in the evenings. Medication helps with focus, organization and staying on task. Reports no adverse side effects. Migraine headaches. Occur approximately 4-5x month. Was taking nortriptyline but discontinued med due to drowsiness. Using fioricet prn with adequate symptom relief. Will need FMLA forms completed to cover missed work days. She will drop off form for completion. Past Medical History:   Diagnosis Date         Acne 3/3/2005    ADD (attention deficit disorder) since 11yo    Allergic rhinitis     Granuloma faciale     Excised    Hyperhydrosis disorder     Axillary    Migraines     Miscarriage     Gyn Dr Guillermo Melo   [facility] S/P tonsillectomy and adenoidectomy      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    Migraine G43.909    JAQUAN (generalized anxiety disorder) F41.1    Attention deficit hyperactivity disorder (ADHD) F90.9     Current Outpatient Prescriptions   Medication Sig    dextroamphetamine-amphetamine (ADDERALL) 20 mg tablet Take 1 Tab (20 mg total) by mouth two (2) times a day. Do not fill before 1/15/17.  butalbital-acetaminophen-caffeine (FIORICET, ESGIC) -40 mg per tablet TAKE 1 TABLET BY MOUTH EVERY 4 HOURS AS NEEDED FOR PAIN    aluminum chloride (HYPERCARE) 20 % external solution Apply to affected areas twice daily as needed    norgestimate-ethinyl estradiol (TRI-SPRINTEC, 28,) 0.18/0.215/0.25 mg-35 mcg (28) tab Take 1 Tab by mouth daily.  MULTIVITAMINS WITH FLUORIDE (MULTI-VITAMIN PO) Take  by mouth daily. No current facility-administered medications for this visit.       Social History   Substance Use Topics    Smoking status: Current Some Day Smoker Packs/day: 0.30     Years: 4.00     Types: Cigarettes    Smokeless tobacco: Never Used    Alcohol use No   Blood pressure 129/76, pulse 94, temperature 98.6 °F (37 °C), temperature source Oral, resp. rate 18, height 5' 4\" (1.626 m), weight 240 lb (108.9 kg), last menstrual period 10/15/2017, SpO2 98 %. Review of Systems   Constitutional: Negative. Negative for weight loss. Cardiovascular: Negative for chest pain and palpitations. Neurological: Positive for headaches. Negative for dizziness, tingling, sensory change and focal weakness. Psychiatric/Behavioral: Negative for depression and substance abuse. The patient is not nervous/anxious and does not have insomnia. All other systems reviewed and are negative. Physical Exam   Constitutional: No distress. Overweight. Neck: Neck supple. Cardiovascular: Normal rate, regular rhythm and normal heart sounds. Pulmonary/Chest: Effort normal and breath sounds normal.   Abdominal: Soft. She exhibits no distension. There is no tenderness. Musculoskeletal: She exhibits no edema. Lymphadenopathy:     She has no cervical adenopathy. Neurological: She is alert. Skin: Skin is warm and dry. Psychiatric: She has a normal mood and affect. Her behavior is normal. Thought content normal.       ASSESSMENT and PLAN  Diagnoses and all orders for this visit:    1. Attention deficit hyperactivity disorder (ADHD), unspecified ADHD type  -     dextroamphetamine-amphetamine (ADDERALL) 20 mg tablet; Take 1 Tab (20 mg total) by mouth two (2) times a day. Do not fill before 1/15/17. Stable on adderall. Medication risks, benefits and potential side effects were reviewed. Given 3 months prescriptions. Schedule follow up 3 months. 2. Migraine without aura and without status migrainosus, not intractable  Stable on fioricet. She will drop off FMLA forms for completion. Reviewed missed work days due to illness.

## 2017-11-02 NOTE — MR AVS SNAPSHOT
Visit Information Date & Time Provider Department Dept. Phone Encounter #  
 11/2/2017  8:00 AM Zohreh Perdomo  [facility] [phone] [id number] Upcoming Health Maintenance Date Due  
 PAP AKA CERVICAL CYTOLOGY 9/26/2019 DTaP/Tdap/Td series (2 - Td) 4/10/2027 Allergies as of 11/2/2017  Review Complete On: 11/2/2017 By: Zohreh Perdomo NP Severity Noted Reaction Type Reactions Codeine  09/27/2012    Shortness of Breath, Nausea and Vomiting  
 sweats Current Immunizations  Never Reviewed No immunizations on file. Not reviewed this visit You Were Diagnosed With   
  
 Codes Comments Attention deficit hyperactivity disorder (ADHD), unspecified ADHD type     ICD-10-CM: F90.9 ICD-9-CM: 314.01 Vitals BP Pulse Temp Resp Height(growth percentile) Weight(growth percentile) 129/76 (BP 1 Location: Left arm, BP Patient Position: Sitting) 94 98.6 °F (37 °C) (Oral) 18 5' 4\" (1.626 m) 240 lb (108.9 kg) LMP SpO2 BMI OB Status Smoking Status 10/15/2017 (Exact Date) 98% 41.2 kg/m2 Having regular periods Current Some Day Smoker Vitals History BMI and BSA Data Body Mass Index Body Surface Area  
 41.2 kg/m 2 2.22 m 2 Preferred Pharmacy Pharmacy Name Phone [id number] [place] [address] [place] [phone] [facility] AT [address] [place], [place] [phone] Your Updated Medication List  
  
   
This list is accurate as of: 11/2/17  8:59 AM.  Always use your most recent med list.  
  
  
  
  
 aluminum chloride 20 % external solution Commonly known as:  HYPERCARE Apply to affected areas twice daily as needed  
  
 butalbital-acetaminophen-caffeine -40 mg per tablet Commonly known as:  FIORICET, ESGIC  
TAKE 1 TABLET BY MOUTH EVERY 4 HOURS AS NEEDED FOR PAIN  
  
 dextroamphetamine-amphetamine 20 mg tablet Commonly known as:  ADDERALL Take 1 Tab (20 mg total) by mouth two (2) times a day. Do not fill before 1/15/17. MULTI-VITAMIN PO Take  by mouth daily. norgestimate-ethinyl estradiol 0.18/0.215/0.25 mg-35 mcg (28) Tab Commonly known as:  TRI-SPRINTEC (28) Take 1 Tab by mouth daily. Prescriptions Printed Refills  
 dextroamphetamine-amphetamine (ADDERALL) 20 mg tablet 0 Sig: Take 1 Tab (20 mg total) by mouth two (2) times a day. Do not fill before 1/15/17. Class: Print Route: Oral  
  
Introducing [address]! Dear Ricardo Pickernig: Thank you for requesting a [employer] account. Our records indicate that you already have an active [employer] Steps account. You can access your account anytime at [email or web address] Did you know that you can access your hospital and ER discharge instructions at any time in Fit Steps? You can also review all of your test results from your hospital stay or ER visit. Additional Information If you have questions, please visit the Frequently Asked Questions section of the [employer] Steps website at [email or web address] Remember, [employer] Steps is NOT to be used for urgent needs. For medical emergencies, dial 911. Now available from your iPhone and Android! Please provide this summary of care documentation to your next provider. Your primary care clinician is listed as Zehra Cruz. If you have any questions after today's visit, please call [phone].

## 2017-11-27 DIAGNOSIS — Z30.011 INITIATION OF OCP (BCP): ICD-10-CM

## 2017-11-27 RX ORDER — NORGESTIMATE AND ETHINYL ESTRADIOL 7DAYSX3 28
KIT ORAL
Qty: 84 TAB | Refills: 3 | Status: SHIPPED | OUTPATIENT
Start: 2017-11-27 | End: 2018-04-27

## 2018-01-23 DIAGNOSIS — G43.009 MIGRAINE WITHOUT AURA AND WITHOUT STATUS MIGRAINOSUS, NOT INTRACTABLE: ICD-10-CM

## 2018-01-23 NOTE — TELEPHONE ENCOUNTER
From: Rolan Doran  To: Addison Muhammad NP  Sent: 1/23/2018 5:20 AM EST  Subject: Medication Renewal Request    Original authorizing provider: MALGORZATA Shafer.  Kaitlynn Zuñiag would like a refill of the following medications:  butalbital-acetaminophen-caffeine (FIORICET, ESGIC) -40 mg per tablet [Raegan Sutherland NP]    Preferred pharmacy: [id number]- [place] [place]    Comment:

## 2018-01-24 RX ORDER — BUTALBITAL, ACETAMINOPHEN AND CAFFEINE 50; 325; 40 MG/1; MG/1; MG/1
TABLET ORAL
Qty: 30 TAB | Refills: 1 | Status: SHIPPED | OUTPATIENT
Start: 2018-01-24 | End: 2018-04-27 | Stop reason: SDUPTHER

## 2018-01-30 ENCOUNTER — TELEPHONE (OUTPATIENT)
Dept: FAMILY MEDICINE CLINIC | Age: 28
End: 2018-01-30

## 2018-01-30 NOTE — TELEPHONE ENCOUNTER
Patient is calling, she is requesting a call back with the status of her [facility] paperwork.      Best call back # for patient: [phone]

## 2018-02-01 ENCOUNTER — OFFICE VISIT (OUTPATIENT)
Dept: FAMILY MEDICINE CLINIC | Age: 28
End: 2018-02-01

## 2018-02-01 VITALS
HEART RATE: 64 BPM | TEMPERATURE: 98.6 F | WEIGHT: 237 LBS | DIASTOLIC BLOOD PRESSURE: 94 MMHG | RESPIRATION RATE: 18 BRPM | SYSTOLIC BLOOD PRESSURE: 132 MMHG | HEIGHT: 64 IN | BODY MASS INDEX: 40.46 KG/M2

## 2018-02-01 DIAGNOSIS — G43.009 MIGRAINE WITHOUT AURA AND WITHOUT STATUS MIGRAINOSUS, NOT INTRACTABLE: ICD-10-CM

## 2018-02-01 DIAGNOSIS — F90.9 ATTENTION DEFICIT HYPERACTIVITY DISORDER (ADHD), UNSPECIFIED ADHD TYPE: Primary | ICD-10-CM

## 2018-02-01 DIAGNOSIS — E66.01 OBESITY, MORBID (HCC): ICD-10-CM

## 2018-02-01 RX ORDER — DEXTROAMPHETAMINE SACCHARATE, AMPHETAMINE ASPARTATE, DEXTROAMPHETAMINE SULFATE AND AMPHETAMINE SULFATE 5; 5; 5; 5 MG/1; MG/1; MG/1; MG/1
20 TABLET ORAL 2 TIMES DAILY
Qty: 60 TAB | Refills: 0 | Status: SHIPPED | OUTPATIENT
Start: 2018-02-01 | End: 2018-04-27

## 2018-02-01 RX ORDER — DEXTROAMPHETAMINE SACCHARATE, AMPHETAMINE ASPARTATE, DEXTROAMPHETAMINE SULFATE AND AMPHETAMINE SULFATE 5; 5; 5; 5 MG/1; MG/1; MG/1; MG/1
20 TABLET ORAL 2 TIMES DAILY
Qty: 60 TAB | Refills: 0 | Status: SHIPPED | OUTPATIENT
Start: 2018-02-01 | End: 2018-02-01 | Stop reason: SDUPTHER

## 2018-02-01 RX ORDER — TOPIRAMATE 25 MG/1
TABLET ORAL
Qty: 60 TAB | Refills: 0 | Status: SHIPPED | OUTPATIENT
Start: 2018-02-01 | End: 2018-03-30 | Stop reason: SDUPTHER

## 2018-02-01 NOTE — PROGRESS NOTES
Chief Complaint   Patient presents with    Form Completion     FMLA     1. Have you been to the ER, urgent care clinic since your last visit? Hospitalized since your last visit? No    2. Have you seen or consulted any other health care providers outside of the [address] [place] [address] since your last visit? Include any pap smears or colon screening.  No

## 2018-02-01 NOTE — PROGRESS NOTES
HISTORY OF PRESENT ILLNESS  Samantha See is a 32 y.o. female. HPI  Follow up ADHD and migraine headaches. Taking prescribed adderall. 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 Medication helps with focus, organization and staying on task. Reports med seems to wear off quicker. Would like to increase dose. Migraine headaches. Takes fioricet for more severe symptoms. Using aleve for lesser symptoms. Reports headaches have been occurring more frequently over the past several weeks. Occur almost on a daily basis. Was on topamax in the past, was effective. Brings [employer] papers for completion to cover missed time at work. Past Medical History:   Diagnosis Date         Acne 3/3/2005    ADD (attention deficit disorder) since 13yo    Allergic rhinitis     JAQUAN (generalized anxiety disorder)     Granuloma faciale     Excised    Hyperhydrosis disorder     Axillary    Migraines     Miscarriage     Gyn Dr Ila Zarate   [facility] tonsillectomy and adenoidectomy      Current Outpatient Prescriptions   Medication Sig    dextroamphetamine-amphetamine (ADDERALL) 20 mg tablet Take 1 Tab (20 mg total) by mouth two (2) times a day. Do not fill before 4/15/18.  topiramate (TOPAMAX) 25 mg tablet Take 1 tablet nightly x 1 week then increase to 1 tablet bid.  butalbital-acetaminophen-caffeine (FIORICET, ESGIC) -40 mg per tablet TAKE 1 TABLET BY MOUTH EVERY 4 HOURS AS NEEDED FOR PAIN    TRI-PREVIFEM, 28, 0.18/0.215/0.25 mg-35 mcg (28) tab TAKE 1 TAB BY MOUTH DAILY.  aluminum chloride (HYPERCARE) 20 % external solution Apply to affected areas twice daily as needed    MULTIVITAMINS WITH FLUORIDE (MULTI-VITAMIN PO) Take  by mouth daily. No current facility-administered medications for this visit.       Social History   Substance Use Topics    Smoking status: Current Some Day Smoker     Packs/day: 0.30     Years: 4.00     Types: Cigarettes    Smokeless tobacco: Never Used    Alcohol use No     Current Outpatient Prescriptions   Medication Sig    dextroamphetamine-amphetamine (ADDERALL) 20 mg tablet Take 1 Tab (20 mg total) by mouth two (2) times a day. Do not fill before 4/15/18.  topiramate (TOPAMAX) 25 mg tablet Take 1 tablet nightly x 1 week then increase to 1 tablet bid.  butalbital-acetaminophen-caffeine (FIORICET, ESGIC) -40 mg per tablet TAKE 1 TABLET BY MOUTH EVERY 4 HOURS AS NEEDED FOR PAIN    TRI-PREVIFEM, 28, 0.18/0.215/0.25 mg-35 mcg (28) tab TAKE 1 TAB BY MOUTH DAILY.  aluminum chloride (HYPERCARE) 20 % external solution Apply to affected areas twice daily as needed    MULTIVITAMINS WITH FLUORIDE (MULTI-VITAMIN PO) Take  by mouth daily. No current facility-administered medications for this visit. Social History   Substance Use Topics    Smoking status: Current Some Day Smoker     Packs/day: 0.30     Years: 4.00     Types: Cigarettes    Smokeless tobacco: Never Used    Alcohol use No     Visit Vitals    BP (!) 132/94 (BP 1 Location: Left arm, BP Patient Position: Sitting)    Pulse 64    Temp 98.6 °F (37 °C) (Oral)    Resp 18    Ht 5' 4\" (1.626 m)    Wt 237 lb (107.5 kg)    BMI 40.68 kg/m2     Review of Systems   Constitutional: Negative. Respiratory: Negative for cough and shortness of breath. Cardiovascular: Negative for chest pain, palpitations and leg swelling. Neurological: Positive for headaches. Negative for dizziness and sensory change. Psychiatric/Behavioral: Negative for depression, substance abuse and suicidal ideas. The patient is not nervous/anxious and does not have insomnia. All other systems reviewed and are negative. Physical Exam   Constitutional: No distress. Overweight. Neck: Neck supple. Cardiovascular: Normal rate, regular rhythm and normal heart sounds. Pulmonary/Chest: Effort normal and breath sounds normal.   Abdominal: Soft. She exhibits no distension.  There is no tenderness. There is no guarding. Musculoskeletal: She exhibits no edema. Lymphadenopathy:     She has no cervical adenopathy. Neurological: She is alert. No cranial nerve deficit. Coordination normal.   Skin: Skin is warm and dry. Psychiatric: She has a normal mood and affect. Her behavior is normal. Thought content normal.       ASSESSMENT and PLAN  Diagnoses and all orders for this visit:    1. Attention deficit hyperactivity disorder (ADHD), unspecified ADHD type  -     dextroamphetamine-amphetamine (ADDERALL) 20 mg tablet; Take 1 Tab (20 mg total) by mouth two (2) times a day. Do not fill before 4/15/18. Moderately stable on adderall. Offered to switch to XR med. Advised against increasing dose at this time due to potential side effects. She will consider changing to XR. Given 3 month rx. 2. Migraine without aura and without status migrainosus, not intractable  -     topiramate (TOPAMAX) 25 mg tablet; Take 1 tablet nightly x 1 week then increase to 1 tablet bid. Symptoms exacerbated. Trial topamax as directed. Medication risks, benefits and potential side effects were reviewed. Will complete [place] papers and fax to employer. 3. Obesity, morbid ([facility] [address]    Discussed the patient's BMI with her. The BMI follow up plan is as follows:     dietary management education, guidance, and counseling  encourage exercise  monitor weight  prescribed dietary intake    An After Visit Summary was printed and given to the patient. Follow up 3 months or sooner prn.

## 2018-02-01 NOTE — PATIENT INSTRUCTIONS
Body Mass Index: Care Instructions  Your Care Instructions    Body mass index (BMI) can help you see if your weight is raising your risk for health problems. It uses a formula to compare how much you weigh with how tall you are. · A BMI lower than 18.5 is considered underweight. · A BMI between 18.5 and 24.9 is considered healthy. · A BMI between 25 and 29.9 is considered overweight. A BMI of 30 or higher is considered obese. If your BMI is in the normal range, it means that you have a lower risk for weight-related health problems. If your BMI is in the overweight or obese range, you may be at increased risk for weight-related health problems, such as high blood pressure, heart disease, stroke, arthritis or joint pain, and diabetes. If your BMI is in the underweight range, you may be at increased risk for health problems such as fatigue, lower protection (immunity) against illness, muscle loss, bone loss, hair loss, and hormone problems. BMI is just one measure of your risk for weight-related health problems. You may be at higher risk for health problems if you are not active, you eat an unhealthy diet, or you drink too much alcohol or use tobacco products. Follow-up care is a key part of your treatment and safety. Be sure to make and go to all appointments, and call your doctor if you are having problems. It's also a good idea to know your test results and keep a list of the medicines you take. How can you care for yourself at home? · Practice healthy eating habits. This includes eating plenty of fruits, vegetables, whole grains, lean protein, and low-fat dairy. · If your doctor recommends it, get more exercise. Walking is a good choice. Bit by bit, increase the amount you walk every day. Try for at least 30 minutes on most days of the week. · Do not smoke. Smoking can increase your risk for health problems. If you need help quitting, talk to your doctor about stop-smoking programs and medicines. These can increase your chances of quitting for good. · Limit alcohol to 2 drinks a day for men and 1 drink a day for women. Too much alcohol can cause health problems. If you have a BMI higher than 25  · Your doctor may do other tests to check your risk for weight-related health problems. This may include measuring the distance around your waist. A waist measurement of more than 40 inches in men or 35 inches in women can increase the risk of weight-related health problems. · Talk with your doctor about steps you can take to stay healthy or improve your health. You may need to make lifestyle changes to lose weight and stay healthy, such as changing your diet and getting regular exercise. If you have a BMI lower than 18.5  · Your doctor may do other tests to check your risk for health problems. · Talk with your doctor about steps you can take to stay healthy or improve your health. You may need to make lifestyle changes to gain or maintain weight and stay healthy, such as getting more healthy foods in your diet and doing exercises to build muscle. Where can you learn more? Go to http://wanda-alden.info/. Enter S176 in the search box to learn more about \"Body Mass Index: Care Instructions. \"  Current as of: October 13, 2016  Content Version: 11.4  © 3961-8349 Healthwise, Incorporated. Care instructions adapted under license by [employer] (which disclaims liability or warranty for this information). If you have questions about a medical condition or this instruction, always ask your healthcare professional. [address] any warranty or liability for your use of this information.

## 2018-02-01 NOTE — TELEPHONE ENCOUNTER
Carmine Olguin, MALGORZATA Thorne LPN        Caller: Unspecified (2 days ago, 11:54 AM)                     Patient seen today.  Papers faxed.

## 2018-02-01 NOTE — MR AVS SNAPSHOT
[facility] 
 
 
 [address] [facility] 
[phone] Patient: Samantha See MRN: [id number] Visit Information Date & Time Provider Department Dept. Phone Encounter #  
 2/1/2018  2:00 PM Loida Valdivia, [address] [phone] [id number] Upcoming Health Maintenance Date Due  
 PAP AKA CERVICAL CYTOLOGY 9/26/2019 DTaP/Tdap/Td series (2 - Td) 4/10/2027 Allergies as of 2/1/2018  Review Complete On: 2/1/2018 By: Loida Valdivia, MALGORAZTA Severity Noted Reaction Type Reactions Codeine  09/27/2012    Shortness of Breath, Nausea and Vomiting  
 sweats Current Immunizations  Reviewed on 11/2/2017 Name Date Influenza Vaccine (Quad) PF 11/2/2017 Not reviewed this visit You Were Diagnosed With   
  
 Codes Comments Attention deficit hyperactivity disorder (ADHD), unspecified ADHD type    -  Primary ICD-10-CM: F90.9 ICD-9-CM: 314.01 Migraine without aura and without status migrainosus, not intractable     ICD-10-CM: I15.426 ICD-9-CM: 346.10 Vitals BP Pulse Temp Resp Height(growth percentile) Weight(growth percentile) (!) 132/94 (BP 1 Location: Left arm, BP Patient Position: Sitting) 64 98.6 °F (37 °C) (Oral) 18 5' 4\" (1.626 m) 237 lb (107.5 kg) LMP BMI OB Status Smoking Status 01/24/2018 40.68 kg/m2 Having regular periods Current Some Day Smoker BMI and BSA Data Body Mass Index Body Surface Area  
 40.68 kg/m 2 2.2 m 2 Preferred Pharmacy Pharmacy Name Phone [phone] AT [address] [place] [address] [phone] Your Updated Medication List  
  
   
This list is accurate as of: 2/1/18  2:48 PM.  Always use your most recent med list.  
  
  
  
  
 aluminum chloride 20 % external solution Commonly known as:  HYPERCARE Apply to affected areas twice daily as needed butalbital-acetaminophen-caffeine -40 mg per tablet Commonly known as:  FIORICET, ESGIC  
TAKE 1 TABLET BY MOUTH EVERY 4 HOURS AS NEEDED FOR PAIN  
  
 dextroamphetamine-amphetamine 20 mg tablet Commonly known as:  ADDERALL Take 1 Tab (20 mg total) by mouth two (2) times a day. Do not fill before 4/15/18. MULTI-VITAMIN PO Take  by mouth daily. topiramate 25 mg tablet Commonly known as:  TOPAMAX Take 1 tablet nightly x 1 week then increase to 1 tablet bid. TRI-PREVIFEM (28) 0.18/0.215/0.25 mg-35 mcg (28) Tab Generic drug:  norgestimate-ethinyl estradiol TAKE 1 TAB BY MOUTH DAILY. Prescriptions Printed Refills  
 dextroamphetamine-amphetamine (ADDERALL) 20 mg tablet 0 Sig: Take 1 Tab (20 mg total) by mouth two (2) times a day. Do not fill before 4/15/18. Class: Print Route: Oral  
  
Prescriptions Sent to Pharmacy Refills  
 topiramate (TOPAMAX) 25 mg tablet 0 Sig: Take 1 tablet nightly x 1 week then increase to 1 tablet bid. Class: Normal  
 Pharmacy: [address] [place] [address], [facility]. [address]  #: [phone] Introducing [facility]! Dear Karrie: Thank you for requesting a phorus account. Our records indicate that you already have an active phorus account. You can access your account anytime at [email or web address] Did you know that you can access your hospital and ER discharge instructions at any time in phorus? You can also review all of your test results from your hospital stay or ER visit. Additional Information If you have questions, please visit the Frequently Asked Questions section of the phorus website at [email or web address] Remember, phorus is NOT to be used for urgent needs. For medical emergencies, dial 911. Now available from your iPhone and Android! Please provide this summary of care documentation to your next provider. Your primary care clinician is listed as Diego Costa. If you have any questions after today's visit, please call [phone].

## 2018-02-15 ENCOUNTER — TELEPHONE (OUTPATIENT)
Dept: FAMILY MEDICINE CLINIC | Age: 28
End: 2018-02-15

## 2018-02-15 DIAGNOSIS — F90.9 ATTENTION DEFICIT HYPERACTIVITY DISORDER (ADHD), UNSPECIFIED ADHD TYPE: ICD-10-CM

## 2018-02-15 RX ORDER — DEXTROAMPHETAMINE SACCHARATE, AMPHETAMINE ASPARTATE, DEXTROAMPHETAMINE SULFATE AND AMPHETAMINE SULFATE 5; 5; 5; 5 MG/1; MG/1; MG/1; MG/1
20 TABLET ORAL 2 TIMES DAILY
Qty: 60 TAB | Refills: 0 | OUTPATIENT
Start: 2018-02-15

## 2018-02-15 NOTE — TELEPHONE ENCOUNTER
----- Message from Ofelia Villatoro Page sent at 2/15/2018 12:18 PM EST -----  Regarding: NP [place]/Refill  Pt is requesting, authorization to insurance company for medication (Phillip Germain) [phone]. [facility] and [facility] refuse to give [facility]. Best contact number is [phone].

## 2018-03-30 DIAGNOSIS — G43.009 MIGRAINE WITHOUT AURA AND WITHOUT STATUS MIGRAINOSUS, NOT INTRACTABLE: ICD-10-CM

## 2018-03-30 RX ORDER — TOPIRAMATE 25 MG/1
TABLET ORAL
Qty: 60 TAB | Refills: 0 | Status: SHIPPED | OUTPATIENT
Start: 2018-03-30 | End: 2018-04-13 | Stop reason: SDUPTHER

## 2018-04-26 ENCOUNTER — TELEPHONE (OUTPATIENT)
Dept: FAMILY MEDICINE CLINIC | Age: 28
End: 2018-04-26

## 2018-04-26 NOTE — TELEPHONE ENCOUNTER
Called and spoke with patient. Advised patient that she will need an appointment to receive a work note.  Appointment is scheduled for Friday, April 27, 2018 02:15 PM.

## 2018-04-26 NOTE — TELEPHONE ENCOUNTER
----- Message from Brittany Newellfahad sent at 4/26/2018  3:04 PM EDT -----  Regarding: NP [place]/Telephone  Pt is calling to r/s a sooner  appt with MALGORZATA Sutherland on a Monday for a medication f/u. She also stated she would like to request doctor's note for her being sick last two days due to her being pregnant. [phone].

## 2018-04-27 ENCOUNTER — OFFICE VISIT (OUTPATIENT)
Dept: FAMILY MEDICINE CLINIC | Age: 28
End: 2018-04-27

## 2018-04-27 VITALS
OXYGEN SATURATION: 100 % | HEART RATE: 72 BPM | RESPIRATION RATE: 18 BRPM | WEIGHT: 248.2 LBS | BODY MASS INDEX: 42.37 KG/M2 | HEIGHT: 64 IN | DIASTOLIC BLOOD PRESSURE: 77 MMHG | SYSTOLIC BLOOD PRESSURE: 123 MMHG | TEMPERATURE: 97.7 F

## 2018-04-27 DIAGNOSIS — O26.811 PREGNANCY RELATED FATIGUE IN FIRST TRIMESTER: ICD-10-CM

## 2018-04-27 DIAGNOSIS — G43.009 MIGRAINE WITHOUT AURA AND WITHOUT STATUS MIGRAINOSUS, NOT INTRACTABLE: Primary | ICD-10-CM

## 2018-04-27 RX ORDER — BUTALBITAL, ACETAMINOPHEN AND CAFFEINE 50; 325; 40 MG/1; MG/1; MG/1
TABLET ORAL
Qty: 30 TAB | Refills: 1 | Status: SHIPPED | OUTPATIENT
Start: 2018-04-27 | End: 2018-07-24 | Stop reason: SDUPTHER

## 2018-04-27 NOTE — PATIENT INSTRUCTIONS
Pregnancy Precautions: Care Instructions  Your Care Instructions    There is no sure way to prevent labor before your due date ( labor) or to prevent most other pregnancy problems. But there are things you can do to increase your chances of a healthy pregnancy. Go to your appointments, follow your doctor's advice, and take good care of yourself. Eat well, and exercise (if your doctor agrees). And make sure to drink plenty of water. Follow-up care is a key part of your treatment and safety. Be sure to make and go to all appointments, and call your doctor if you are having problems. It's also a good idea to know your test results and keep a list of the medicines you take. How can you care for yourself at home? · Make sure you go to your prenatal appointments. At each visit, your doctor will check your blood pressure. Your doctor will also check to see if you have protein in your urine. High blood pressure and protein in urine are signs of preeclampsia. This condition can be dangerous for you and your baby. · Drink plenty of fluids, enough so that your urine is light yellow or clear like water. Dehydration can cause contractions. If you have kidney, heart, or liver disease and have to limit fluids, talk with your doctor before you increase the amount of fluids you drink. · Tell your doctor right away if you notice any symptoms of an infection, such as:  ¨ Burning when you urinate. ¨ A foul-smelling discharge from your vagina. ¨ Vaginal itching. ¨ Unexplained fever. ¨ Unusual pain or soreness in your uterus or lower belly. · Eat a balanced diet. Include plenty of foods that are high in calcium and iron. ¨ Foods high in calcium include milk, cheese, yogurt, almonds, and broccoli. ¨ Foods high in iron include red meat, shellfish, poultry, eggs, beans, raisins, whole-grain bread, and leafy green vegetables. · Do not smoke.  If you need help quitting, talk to your doctor about stop-smoking programs and medicines. These can increase your chances of quitting for good. · Do not drink alcohol or use illegal drugs. · Follow your doctor's directions about activity. Your doctor will let you know how much, if any, exercise you can do. · Ask your doctor if you can have sex. If you are at risk for early labor, your doctor may ask you to not have sex. · Take care to prevent falls. During pregnancy, your joints are loose, and your balance is off. Sports such as bicycling, skiing, or in-line skating can increase your risk of falling. And don't ride horses or motorcycles, dive, water ski, scuba dive, or parachute jump while you are pregnant. · Avoid getting very hot. Do not use saunas or hot tubs. Avoid staying out in the sun in hot weather for long periods. Take acetaminophen (Tylenol) to lower a high fever. · Do not take any over-the-counter or herbal medicines or supplements without talking to your doctor or pharmacist first.  When should you call for help? Call 911 anytime you think you may need emergency care. For example, call if:  ? · You passed out (lost consciousness). ? · You have severe vaginal bleeding. ? · You have severe pain in your belly or pelvis. ? · You have had fluid gushing or leaking from your vagina and you know or think the umbilical cord is bulging into your vagina. If this happens, immediately get down on your knees so your rear end (buttocks) is higher than your head. This will decrease the pressure on the cord until help arrives. ?Call your doctor now or seek immediate medical care if:  ? · You have signs of preeclampsia, such as:  ¨ Sudden swelling of your face, hands, or feet. ¨ New vision problems (such as dimness or blurring). ¨ A severe headache. ? · You have any vaginal bleeding. ? · You have belly pain or cramping. ? · You have a fever. ? · You have had regular contractions (with or without pain) for an hour.  This means that you have 8 or more within 1 hour or 4 or more in 20 minutes after you change your position and drink fluids. ? · You have a sudden release of fluid from your vagina. ? · You have low back pain or pelvic pressure that does not go away. ? · You notice that your baby has stopped moving or is moving much less than normal.   ? Watch closely for changes in your health, and be sure to contact your doctor if you have any problems. Where can you learn more? Go to http://wanda-alden.info/. Enter [id number] in the search box to learn more about \"Pregnancy Precautions: Care Instructions. \"  Current as of: March 16, 2017  Content Version: 11.4  © 5257-2070 [employer]. Care instructions adapted under license by [employer] (which disclaims liability or warranty for this information). If you have questions about a medical condition or this instruction, always ask your healthcare professional. [address] any warranty or liability for your use of this information.

## 2018-04-27 NOTE — PROGRESS NOTES
Assessment/Plan:     Diagnoses and all orders for this visit:    1. Migraine without aura and without status migrainosus, not intractable  -     butalbital-acetaminophen-caffeine (FIORICET, ESGIC) -40 mg per tablet; TAKE 1 TABLET BY MOUTH EVERY 4 HOURS AS NEEDED FOR PAIN  Refilled today which she understands she can take in first trimester only and must discontinue 2 months prior to her delivery. This should be taken sparingly and she should notify her OB when using. 2. Pregnancy related fatigue in first trimester  Likely physiologic in nature. She will follow up with OB as previously scheduled. Discussed likely improvement in her symptoms at the 12 week jayant. She is also discontinued stimulant therapy recently which is worsening symptoms. Will continue to monitor. Given work excuse for today. Follow-up Disposition:  Return if symptoms worsen or fail to improve. Discussed expected course/resolution/complications of diagnosis in detail with patient.    Medication risks/benefits/costs/interactions/alternatives discussed with patient.    Pt was given after visit summary which includes diagnoses, current medications & vitals. Pt expressed understanding with the diagnosis and plan          Subjective:      Rach Gan is a 29 y.o. female who presents for had concerns including Fatigue and Chills. 7 week pregnant in her first pregnancy. Seen by [place], Marco Antonio Jessica NP on Wednesday and discontinued Adderall. Reports severe fatigue since that time. She is currently working and attending school. She has been out of work for 2 days due to her severe fatigue. She is not currently taking anything for ADD. She has follow-up on Tuesday with her OB for ultrasound and blood testing. She is sleeping well. She reports restful sleep. She otherwise denies recent illness, fever, abdominal cramping, menstrual bleeding.     Current Outpatient Prescriptions   Medication Sig Dispense Refill    prenatal no115/iron/folic acid (PRENATAL 19 PO) Take  by mouth.  butalbital-acetaminophen-caffeine (FIORICET, ESGIC) -40 mg per tablet TAKE 1 TABLET BY MOUTH EVERY 4 HOURS AS NEEDED FOR PAIN 30 Tab 1    aluminum chloride (HYPERCARE) 20 % external solution Apply to affected areas twice daily as needed 35 mL 0       Allergies   Allergen Reactions    Codeine Shortness of Breath and Nausea and Vomiting     sweats       ROS:   Complete review of systems was reviewed with pertinent information listed in HPI. Objective:     Visit Vitals    /77 (BP 1 Location: Left arm, BP Patient Position: Sitting)    Pulse 72    Temp 97.7 °F (36.5 °C) (Oral)    Resp 18    Ht 5' 4\" (1.626 m)    Wt 248 lb 3.2 oz (112.6 kg)    LMP 01/24/2018    SpO2 100%    BMI 42.6 kg/m2       Vitals and Nurse Documentation reviewed. Physical Exam   Constitutional: No distress. HENT:   Right Ear: Tympanic membrane is not erythematous and not bulging. No middle ear effusion. Left Ear: Tympanic membrane is not erythematous and not bulging. No middle ear effusion. Nose: No rhinorrhea. Right sinus exhibits no maxillary sinus tenderness and no frontal sinus tenderness. Left sinus exhibits no maxillary sinus tenderness and no frontal sinus tenderness. Mouth/Throat: No oropharyngeal exudate or posterior oropharyngeal erythema. Eyes: EOM and lids are normal.   Cardiovascular: S1 normal and S2 normal.  Exam reveals no gallop and no friction rub. No murmur heard. Pulmonary/Chest: Breath sounds normal. She has no wheezes. Lymphadenopathy:     She has no cervical adenopathy. Skin: Skin is warm and dry.    Psychiatric: Mood and affect normal.

## 2018-04-27 NOTE — LETTER
NOTIFICATION RETURN TO WORK / SCHOOL 
 
4/27/2018 3:01 PM 
 
Ms. Karen Haas [address] [facility] [address] [place] [address] To Whom It May Concern: 
 
Karen Haas is currently under the care of [facility]. Please excuse her from  4/25/18 to 4/27/18. If there are questions or concerns please have the patient contact our office. Sincerely, Jayda Hitchcock NP

## 2018-04-27 NOTE — PROGRESS NOTES
Chief Complaint   Patient presents with    Fatigue     Patient is 7 weeks. OBGYN discontinued while pregnant. Patient is fatigued and tired.  Chills     1. Have you been to the ER, urgent care clinic since your last visit? Hospitalized since your last visit? No    2. Have you seen or consulted any other health care providers outside of the [facility] since your last visit? Include any pap smears or colon screening.  No

## 2018-04-27 NOTE — MR AVS SNAPSHOT
[address] [facility] 
[phone] Patient: Khoa Gaines MRN: [id number] UMP:6[birth year] Visit Information Date & Time Provider Department Dept. Phone Encounter #  
 4/27/2018  2:15 PM Babs Corbin  [facility] [phone] [id number] Follow-up Instructions Return if symptoms worsen or fail to improve. Upcoming Health Maintenance Date Due Influenza Age 5 to Adult 8/1/2018 PAP AKA CERVICAL CYTOLOGY 9/26/2019 DTaP/Tdap/Td series (2 - Td) 4/10/2027 Allergies as of 4/27/2018  Review Complete On: 4/27/2018 By: Babs Corbin NP Severity Noted Reaction Type Reactions Codeine  09/27/2012    Shortness of Breath, Nausea and Vomiting  
 sweats Current Immunizations  Reviewed on 11/2/2017 Name Date Influenza Vaccine (Quad) PF 11/2/2017 Not reviewed this visit You Were Diagnosed With   
  
 Codes Comments Migraine without aura and without status migrainosus, not intractable    -  Primary ICD-10-CM: G43.009 ICD-9-CM: 346.10 Vitals BP Pulse Temp Resp Height(growth percentile) Weight(growth percentile) 123/77 (BP 1 Location: Left arm, BP Patient Position: Sitting) 72 97.7 °F (36.5 °C) (Oral) 18 5' 4\" (1.626 m) 248 lb 3.2 oz (112.6 kg) LMP SpO2 BMI OB Status Smoking Status 01/24/2018 100% 42.6 kg/m2 Pregnant Current Some Day Smoker BMI and BSA Data Body Mass Index Body Surface Area  
 42.6 kg/m 2 2.25 m 2 Preferred Pharmacy Pharmacy Name Phone [id number] [place], [place]  [address] [facility] AT [address] [place] [address] [phone] Your Updated Medication List  
  
   
This list is accurate as of 4/27/18  2:59 PM.  Always use your most recent med list.  
  
  
  
  
 aluminum chloride 20 % external solution Commonly known as:  HYPERCARE Apply to affected areas twice daily as needed butalbital-acetaminophen-caffeine -40 mg per tablet Commonly known as:  FIORICET, ESGIC  
TAKE 1 TABLET BY MOUTH EVERY 4 HOURS AS NEEDED FOR PAIN  
  
 PRENATAL 19 PO Take  by mouth. Prescriptions Sent to Pharmacy Refills  
 butalbital-acetaminophen-caffeine (FIORICET, ESGIC) -40 mg per tablet 1 Sig: TAKE 1 TABLET BY MOUTH EVERY 4 HOURS AS NEEDED FOR PAIN Class: Normal  
 Pharmacy: [address] [place] [address], Scar Sp Coleman [address]  #: [phone] Follow-up Instructions Return if symptoms worsen or fail to improve. Patient Instructions Pregnancy Precautions: Care Instructions Your Care Instructions There is no sure way to prevent labor before your due date ( labor) or to prevent most other pregnancy problems. But there are things you can do to increase your chances of a healthy pregnancy. Go to your appointments, follow your doctor's advice, and take good care of yourself. Eat well, and exercise (if your doctor agrees). And make sure to drink plenty of water. Follow-up care is a key part of your treatment and safety. Be sure to make and go to all appointments, and call your doctor if you are having problems. It's also a good idea to know your test results and keep a list of the medicines you take. How can you care for yourself at home? · Make sure you go to your prenatal appointments. At each visit, your doctor will check your blood pressure. Your doctor will also check to see if you have protein in your urine. High blood pressure and protein in urine are signs of preeclampsia. This condition can be dangerous for you and your baby. · Drink plenty of fluids, enough so that your urine is light yellow or clear like water. Dehydration can cause contractions. If you have kidney, heart, or liver disease and have to limit fluids, talk with your doctor before you increase the amount of fluids you drink. · Tell your doctor right away if you notice any symptoms of an infection, such as: ¨ Burning when you urinate. ¨ A foul-smelling discharge from your vagina. ¨ Vaginal itching. ¨ Unexplained fever. ¨ Unusual pain or soreness in your uterus or lower belly. · Eat a balanced diet. Include plenty of foods that are high in calcium and iron. ¨ Foods high in calcium include milk, cheese, yogurt, almonds, and broccoli. ¨ Foods high in iron include red meat, shellfish, poultry, eggs, beans, raisins, whole-grain bread, and leafy green vegetables. · Do not smoke. If you need help quitting, talk to your doctor about stop-smoking programs and medicines. These can increase your chances of quitting for good. · Do not drink alcohol or use illegal drugs. · Follow your doctor's directions about activity. Your doctor will let you know how much, if any, exercise you can do. · Ask your doctor if you can have sex. If you are at risk for early labor, your doctor may ask you to not have sex. · Take care to prevent falls. During pregnancy, your joints are loose, and your balance is off. Sports such as bicycling, skiing, or in-line skating can increase your risk of falling. And don't ride horses or motorcycles, dive, water ski, scuba dive, or parachute jump while you are pregnant. · Avoid getting very hot. Do not use saunas or hot tubs. Avoid staying out in the sun in hot weather for long periods. Take acetaminophen (Tylenol) to lower a high fever. · Do not take any over-the-counter or herbal medicines or supplements without talking to your doctor or pharmacist first. 
When should you call for help? Call 911 anytime you think you may need emergency care. For example, call if: 
? · You passed out (lost consciousness). ? · You have severe vaginal bleeding. ? · You have severe pain in your belly or pelvis.   
? · You have had fluid gushing or leaking from your vagina and you know or think the umbilical cord is bulging into your vagina. If this happens, immediately get down on your knees so your rear end (buttocks) is higher than your head. This will decrease the pressure on the cord until help arrives. ?Call your doctor now or seek immediate medical care if: 
? · You have signs of preeclampsia, such as: 
¨ Sudden swelling of your face, hands, or feet. ¨ New vision problems (such as dimness or blurring). ¨ A severe headache. ? · You have any vaginal bleeding. ? · You have belly pain or cramping. ? · You have a fever. ? · You have had regular contractions (with or without pain) for an hour. This means that you have 8 or more within 1 hour or 4 or more in 20 minutes after you change your position and drink fluids. ? · You have a sudden release of fluid from your vagina. ? · You have low back pain or pelvic pressure that does not go away. ? · You notice that your baby has stopped moving or is moving much less than normal. ? Watch closely for changes in your health, and be sure to contact your doctor if you have any problems. Where can you learn more? Go to http://wanda-alden.info/. Enter [id number] in the search box to learn more about \"Pregnancy Precautions: Care Instructions. \" Current as of: March 16, 2017 Content Version: 11.4 © 3019-5952 SpotterRF. Care instructions adapted under license by Doximity (which disclaims liability or warranty for this information). If you have questions about a medical condition or this instruction, always ask your healthcare professional. [place] [address] any warranty or liability for your use of this information. Introducing [facility] & HEALTH SERVICES! Dear Trena Anne: Thank you for requesting a Limtel account. Our records indicate that you already have an active Limtel account. You can access your account anytime at [email or web address] Did you know that you can access your hospital and ER discharge instructions at any time in Foundry Hiring? You can also review all of your test results from your hospital stay or ER visit. Additional Information If you have questions, please visit the Frequently Asked Questions section of the Foundry Hiring website at [email or web address] Remember, Foundry Hiring is NOT to be used for urgent needs. For medical emergencies, dial 911. Now available from your iPhone and Android! Please provide this summary of care documentation to your next provider. Your primary care clinician is listed as Kathy Silva. If you have any questions after today's visit, please call [phone].

## 2018-05-14 ENCOUNTER — OFFICE VISIT (OUTPATIENT)
Dept: FAMILY MEDICINE CLINIC | Age: 28
End: 2018-05-14

## 2018-05-14 VITALS
OXYGEN SATURATION: 100 % | BODY MASS INDEX: 43.02 KG/M2 | WEIGHT: 252 LBS | RESPIRATION RATE: 18 BRPM | HEIGHT: 64 IN | TEMPERATURE: 98.6 F | HEART RATE: 85 BPM | DIASTOLIC BLOOD PRESSURE: 52 MMHG | SYSTOLIC BLOOD PRESSURE: 116 MMHG

## 2018-05-14 DIAGNOSIS — J30.2 SEASONAL ALLERGIC RHINITIS, UNSPECIFIED TRIGGER: Primary | ICD-10-CM

## 2018-05-14 RX ORDER — ALBUTEROL SULFATE 90 UG/1
1 AEROSOL, METERED RESPIRATORY (INHALATION)
Qty: 1 INHALER | Refills: 0 | Status: SHIPPED | OUTPATIENT
Start: 2018-05-14 | End: 2018-12-29 | Stop reason: ALTCHOICE

## 2018-05-14 NOTE — LETTER
NOTIFICATION RETURN TO WORK / SCHOOL 
 
5/14/2018 12:41 PM 
 
Ms. Rach Gan [address] [facility] [place] [address] [facility] [address] [place] [address] To Whom It May Concern: 
 
Rach Gan is currently under the care of [facility]. She was out of work 5/7/18-5/11/18  due to illness. She will return to work on 5/15/18. If there are questions or concerns please have the patient contact our office.  
 
 
 
Sincerely, 
 
 
Glenna Hui NP

## 2018-05-14 NOTE — PROGRESS NOTES
HISTORY OF PRESENT ILLNESS  Kristi Hayes is a 29 y.o. female. HPI  C/o nasal congestion and runny nose x 1 week. Also having some chest tightness. Former smoker. History of AR. She is 8 weeks pregnant. Has been out of work last week due to symptoms. Needs work note. Past medical history, social history, family history and medications were reviewed and updated. Blood pressure 116/52, pulse 85, temperature 98.6 °F (37 °C), temperature source Oral, resp. rate 18, height 5' 4\" (1.626 m), weight 252 lb (114.3 kg), last menstrual period 01/24/2018, SpO2 100 %. Review of Systems   Constitutional: Negative for chills and fever. HENT: Positive for congestion. Negative for sinus pain and sore throat. Respiratory: Negative for cough, shortness of breath and wheezing. Chest tightness   Cardiovascular: Negative. All other systems reviewed and are negative. Physical Exam   Constitutional: No distress. HENT:   Right Ear: Tympanic membrane and ear canal normal.   Left Ear: Tympanic membrane and ear canal normal.   Nose: Mucosal edema present. Right sinus exhibits no maxillary sinus tenderness and no frontal sinus tenderness. Left sinus exhibits no maxillary sinus tenderness and no frontal sinus tenderness. Mouth/Throat: Posterior oropharyngeal erythema present. No oropharyngeal exudate or posterior oropharyngeal edema. Neck: Neck supple. Cardiovascular: Normal rate, regular rhythm and normal heart sounds. Pulmonary/Chest: Effort normal and breath sounds normal.   Lymphadenopathy:     She has no cervical adenopathy. Skin: Skin is warm and dry. ASSESSMENT and PLAN  Diagnoses and all orders for this visit:    1. Seasonal allergic rhinitis, unspecified trigger  -     albuterol (PROVENTIL HFA, VENTOLIN HFA, PROAIR HFA) 90 mcg/actuation inhaler; Take 1 Puff by inhalation every four (4) hours as needed for Wheezing. Recommend consulting with OB for approved AR meds.   May use albuterol prn for chest tightness/wheezing. Use sparingly. Follow up prn sx worsen or FTI.

## 2018-05-14 NOTE — MR AVS SNAPSHOT
[address] [place] 13 
[phone] Patient: Khoa Gaines MRN: [id number] JMD:[birth year] Visit Information Date & Time Provider Department Dept. Phone Encounter #  
 5/14/2018 12:00 PM Atilio Jamesmariluz, 403 [facility] [phone] [id number] Upcoming Health Maintenance Date Due Influenza Age 5 to Adult 8/1/2018 PAP AKA CERVICAL CYTOLOGY 9/26/2019 DTaP/Tdap/Td series (2 - Td) 4/10/2027 Allergies as of 5/14/2018  Review Complete On: 5/14/2018 By: Atilio Wadsworth NP Severity Noted Reaction Type Reactions Codeine  09/27/2012    Shortness of Breath, Nausea and Vomiting  
 sweats Current Immunizations  Reviewed on 11/2/2017 Name Date Influenza Vaccine (Quad) PF 11/2/2017 Not reviewed this visit You Were Diagnosed With   
  
 Codes Comments Seasonal allergic rhinitis, unspecified trigger    -  Primary ICD-10-CM: J30.2 ICD-9-CM: 477.9 Vitals BP Pulse Temp Resp Height(growth percentile) Weight(growth percentile) 116/52 (BP 1 Location: Left arm, BP Patient Position: Sitting) 85 98.6 °F (37 °C) (Oral) 18 5' 4\" (1.626 m) 252 lb (114.3 kg) LMP SpO2 BMI OB Status Smoking Status 01/24/2018 100% 43.26 kg/m2 Pregnant Current Some Day Smoker BMI and BSA Data Body Mass Index Body Surface Area  
 43.26 kg/m 2 2.27 m 2 Preferred Pharmacy Pharmacy Name Phone [id number]- [place] [id number] [phone] Your Updated Medication List  
  
   
This list is accurate as of 5/14/18 12:40 PM.  Always use your most recent med list.  
  
  
  
  
 albuterol 90 mcg/actuation inhaler Commonly known as:  PROVENTIL HFA, VENTOLIN HFA, PROAIR HFA Take 1 Puff by inhalation every four (4) hours as needed for Wheezing. aluminum chloride 20 % external solution Commonly known as:  HYPERCARE Apply to affected areas twice daily as needed  
  
 butalbital-acetaminophen-caffeine -40 mg per tablet Commonly known as:  FIORICET, ESGIC  
TAKE 1 TABLET BY MOUTH EVERY 4 HOURS AS NEEDED FOR PAIN  
  
 PRENATAL 19 PO Take  by mouth. Prescriptions Sent to Pharmacy Refills  
 albuterol (PROVENTIL HFA, VENTOLIN HFA, PROAIR HFA) 90 mcg/actuation inhaler 0 Sig: Take 1 Puff by inhalation every four (4) hours as needed for Wheezing. Class: Normal  
 Pharmacy: [address] [place] [address], Scar KlausVernonJan Coleman [address]  #: [phone] Route: Inhalation Introducing [facility] & HEALTH SERVICES! Dear Karrie: Thank you for requesting a Alohar Mobile account. Our records indicate that you already have an active Alohar Mobile account. You can access your account anytime at [email or web address] Did you know that you can access your hospital and ER discharge instructions at any time in Alohar Mobile? You can also review all of your test results from your hospital stay or ER visit. Additional Information If you have questions, please visit the Frequently Asked Questions section of the Alohar Mobile website at [email or web address] Remember, Alohar Mobile is NOT to be used for urgent needs. For medical emergencies, dial 911. Now available from your iPhone and Android! Please provide this summary of care documentation to your next provider. Your primary care clinician is listed as Stewart Currie. If you have any questions after today's visit, please call [phone].

## 2018-05-14 NOTE — PROGRESS NOTES
Chief Complaint   Patient presents with    Cold Symptoms     patient is pregnant and states that she has been sick and prevented her from going to work.  Nausea    Dizziness    Allergies     1. Have you been to the ER, urgent care clinic since your last visit? Hospitalized since your last visit? No    2. Have you seen or consulted any other health care providers outside of the [address] [place] [address] since your last visit? Include any pap smears or colon screening.

## 2018-05-15 LAB
ANTIBODY SCREEN, EXTERNAL: NEGATIVE
CHLAMYDIA, EXTERNAL: NEGATIVE
HBSAG, EXTERNAL: NEGATIVE
HIV, EXTERNAL: NON REACTIVE
N. GONORRHEA, EXTERNAL: NEGATIVE
RUBELLA, EXTERNAL: NORMAL
T. PALLIDUM, EXTERNAL: NEGATIVE

## 2018-05-24 ENCOUNTER — OFFICE VISIT (OUTPATIENT)
Dept: FAMILY MEDICINE CLINIC | Age: 28
End: 2018-05-24

## 2018-05-24 VITALS
RESPIRATION RATE: 16 BRPM | TEMPERATURE: 98.6 F | BODY MASS INDEX: 43.77 KG/M2 | WEIGHT: 256.4 LBS | HEART RATE: 88 BPM | HEIGHT: 64 IN | OXYGEN SATURATION: 98 % | DIASTOLIC BLOOD PRESSURE: 76 MMHG | SYSTOLIC BLOOD PRESSURE: 110 MMHG

## 2018-05-24 DIAGNOSIS — J06.9 VIRAL URI: Primary | ICD-10-CM

## 2018-05-24 RX ORDER — BUTALBITAL, ACETAMINOPHEN AND CAFFEINE 50; 325; 40 MG/1; MG/1; MG/1
TABLET ORAL
Qty: 30 TAB | Refills: 1 | Status: CANCELLED | OUTPATIENT
Start: 2018-05-24

## 2018-05-24 NOTE — PROGRESS NOTES
Chief Complaint   Patient presents with    Form Completion     [place] Paperwork. took a week off from [address] smoking and Adderall    Routine Prenatal Visit     Pt is 9 weeks pregnant, seeing a GYN     1. Have you been to the ER, urgent care clinic since your last visit? Hospitalized since your last visit? No    2. Have you seen or consulted any other health care providers outside of the [address] [place] [address] since your last visit? Include any pap smears or colon screening.  No

## 2018-05-24 NOTE — PROGRESS NOTES
HISTORY OF PRESENT ILLNESS  Rach Gan is a 29 y.o. female. [facility]  Requesting FMLA papers be completed. She recently had viral URI, missed 7 days of work. Feeling improved at present. Past medical history, social history, family history and medications were reviewed and updated. Visit Vitals    /76    Pulse 88    Temp 98.6 °F (37 °C) (Oral)    Resp 16    Ht 5' 4\" (1.626 m)    Wt 256 lb 6.4 oz (116.3 kg)    SpO2 98%    BMI 44.01 kg/m2     Review of Systems   Constitutional: Negative. HENT: Negative. Respiratory: Negative for cough. All other systems reviewed and are negative. Physical Exam   Constitutional: No distress. Neck: Neck supple. Cardiovascular: Normal rate, regular rhythm and normal heart sounds. Pulmonary/Chest: Effort normal and breath sounds normal.   Lymphadenopathy:     She has no cervical adenopathy. Skin: Skin is warm and dry. ASSESSMENT and PLAN  Diagnoses and all orders for this visit:    1. Viral URI    FMLA papers completed and faxed to employer. Follow up prn.

## 2018-07-24 DIAGNOSIS — G43.009 MIGRAINE WITHOUT AURA AND WITHOUT STATUS MIGRAINOSUS, NOT INTRACTABLE: ICD-10-CM

## 2018-07-24 NOTE — TELEPHONE ENCOUNTER
----- Message from Lamar Lara sent at 7/24/2018  3:51 PM EDT -----  Regarding: Np Ehrenberg/ Refill  Pt requesting a refill on prescription \"Fioricet\" \"[facility] home view\" on file [phone].  Pt best contact number is [phone]

## 2018-07-26 RX ORDER — BUTALBITAL, ACETAMINOPHEN AND CAFFEINE 50; 325; 40 MG/1; MG/1; MG/1
TABLET ORAL
Qty: 30 TAB | Refills: 0 | Status: SHIPPED | OUTPATIENT
Start: 2018-07-26 | End: 2018-12-27 | Stop reason: SDUPTHER

## 2018-08-06 ENCOUNTER — OFFICE VISIT (OUTPATIENT)
Dept: FAMILY MEDICINE CLINIC | Age: 28
End: 2018-08-06

## 2018-08-06 VITALS
TEMPERATURE: 98.2 F | HEIGHT: 64 IN | WEIGHT: 268 LBS | OXYGEN SATURATION: 98 % | SYSTOLIC BLOOD PRESSURE: 123 MMHG | DIASTOLIC BLOOD PRESSURE: 61 MMHG | BODY MASS INDEX: 45.75 KG/M2 | RESPIRATION RATE: 18 BRPM | HEART RATE: 85 BPM

## 2018-08-06 DIAGNOSIS — G43.009 MIGRAINE WITHOUT AURA AND WITHOUT STATUS MIGRAINOSUS, NOT INTRACTABLE: Primary | ICD-10-CM

## 2018-08-06 DIAGNOSIS — E66.01 OBESITY, MORBID (HCC): ICD-10-CM

## 2018-08-06 DIAGNOSIS — F41.1 GAD (GENERALIZED ANXIETY DISORDER): ICD-10-CM

## 2018-08-06 DIAGNOSIS — F32.A MILD DEPRESSIVE DISORDER: ICD-10-CM

## 2018-08-06 NOTE — PROGRESS NOTES
Chief Complaint   Patient presents with    Depression     x3 weeks     1. Have you been to the ER, urgent care clinic since your last visit? Hospitalized since your last visit? No    2. Have you seen or consulted any other health care providers outside of the [address] [place] [address] since your last visit? Include any pap smears or colon screening.  No

## 2018-08-06 NOTE — MR AVS SNAPSHOT
[address] [facility] 
[phone] Patient: Kiara Coto MRN: [id number] VVA:[birth year] Visit Information Date & Time Provider Department Dept. Phone Encounter #  
 8/6/2018  8:15 AM Kimmie Ch  [facility] [phone] [id number] Upcoming Health Maintenance Date Due Influenza Age 5 to Adult 8/1/2018 PAP AKA CERVICAL CYTOLOGY 9/26/2019 DTaP/Tdap/Td series (2 - Td) 4/10/2027 Allergies as of 8/6/2018  Review Complete On: 8/6/2018 By: Kimmie Ch NP Severity Noted Reaction Type Reactions Codeine  09/27/2012    Shortness of Breath, Nausea and Vomiting  
 sweats Current Immunizations  Reviewed on 11/2/2017 Name Date Influenza Vaccine (Quad) PF 11/2/2017 Not reviewed this visit You Were Diagnosed With   
  
 Codes Comments Migraine without aura and without status migrainosus, not intractable    -  Primary ICD-10-CM: G43.009 ICD-9-CM: 346.10 Mild depressive disorder (HCC)     ICD-10-CM: F32.0 ICD-9-CM: 020 Vitals BP Pulse Temp Resp Height(growth percentile) Weight(growth percentile) 123/61 (BP 1 Location: Left arm, BP Patient Position: Sitting) 85 98.2 °F (36.8 °C) (Oral) 18 5' 4\" (1.626 m) 268 lb (121.6 kg) LMP SpO2 BMI OB Status Smoking Status 03/07/2018 98% 46 kg/m2 Pregnant Former Smoker BMI and BSA Data Body Mass Index Body Surface Area  
 46 kg/m 2 2.34 m 2 Preferred Pharmacy [facility] Name Phone [id number] [address] [facility] AT [address] [place] [address] [phone] Your Updated Medication List  
  
   
This list is accurate as of 8/6/18  9:03 AM.  Always use your most recent med list.  
  
  
  
  
 albuterol 90 mcg/actuation inhaler Commonly known as:  PROVENTIL HFA, VENTOLIN HFA, PROAIR HFA  
 Take 1 Puff by inhalation every four (4) hours as needed for Wheezing. aluminum chloride 20 % external solution Commonly known as:  HYPERCARE Apply to affected areas twice daily as needed  
  
 butalbital-acetaminophen-caffeine -40 mg per tablet Commonly known as:  FIORICET, ESGIC  
TAKE 1 TABLET BY MOUTH EVERY 4 HOURS AS NEEDED FOR PAIN  
  
 PRENATAL 19 PO Take  by mouth. Introducing [facility] & HEALTH SERVICES! Dear Karrie: Thank you for requesting a Front App account. Our records indicate that you already have an active Front App account. You can access your account anytime at [email or web address] Did you know that you can access your hospital and ER discharge instructions at any time in Front App? You can also review all of your test results from your hospital stay or ER visit. Additional Information If you have questions, please visit the Frequently Asked Questions section of the Front App website at [email or web address] Remember, Front App is NOT to be used for urgent needs. For medical emergencies, dial 911. Now available from your iPhone and Android! Please provide this summary of care documentation to your next provider. Your primary care clinician is listed as Linda Deshpande. If you have any questions after today's visit, please call [phone].

## 2018-08-06 NOTE — PROGRESS NOTES
HISTORY OF PRESENT ILLNESS  Dorian Odell is a 29 y.o. female. HPI  Follow up chronic health issues and FMLA paperwork. Reports she is almost 6 months pregnant, followed by OB. Has been feeling down over past several weeks. OB recommended SSRI. Patient declined med due to previous intolerance when on anti depressant. She has appointment with counselor on 18. Reports she has missed 2 weeks of work due to her symptoms, brings FMLA forms to be completed. She has history of JAQUAN for which she had been taking xanax prior to pregnancy. Also history of ADHD, she has been off adderall since pregnancy as well and finds she is having difficulty staying on task and focused at work. History of migraine headaches. Takes periodic time out work when symptoms flare up. Needs FMLA forms completed to cover missed time. Reports headaches have been stable. Using fioricet with good effect. Advised by OB not to take fioricet after 6 months pregnant. Past Medical History:   Diagnosis Date         Acne 3/3/2005    ADD (attention deficit disorder) since 13yo    Allergic rhinitis     JAQUAN (generalized anxiety disorder)     Granuloma faciale     Excised    Hyperhydrosis disorder     Axillary    Migraines 2005    Miscarriage     Gyn Dr Lincoln Steward S/P tonsillectomy and adenoidectomy 2003     Patient Active Problem List   Diagnosis Code    JAQUAN (generalized anxiety disorder) F41.1    Attention deficit hyperactivity disorder (ADHD) F90.9    Migraine without aura and without status migrainosus, not intractable G43.009    Obesity, morbid ([facility] [address] E66.01     Current Outpatient Prescriptions   Medication Sig    butalbital-acetaminophen-caffeine (FIORICET, ESGIC) -40 mg per tablet TAKE 1 TABLET BY MOUTH EVERY 4 HOURS AS NEEDED FOR PAIN    albuterol (PROVENTIL HFA, VENTOLIN HFA, PROAIR HFA) 90 mcg/actuation inhaler Take 1 Puff by inhalation every four (4) hours as needed for Wheezing.     prenatal no115/iron/folic acid (PRENATAL 19 PO) Take  by mouth.  aluminum chloride (HYPERCARE) 20 % external solution Apply to affected areas twice daily as needed     No current facility-administered medications for this visit. Social History   Substance Use Topics    Smoking status: Former Smoker     Packs/day: 0.30     Years: 4.00     Types: Cigarettes     Quit date: 5/17/2018    Smokeless tobacco: Never Used    Alcohol use No     Visit Vitals    /61 (BP 1 Location: Left arm, BP Patient Position: Sitting)    Pulse 85    Temp 98.2 °F (36.8 °C) (Oral)    Resp 18    Ht 5' 4\" (1.626 m)    Wt 268 lb (121.6 kg)    SpO2 98%    BMI 46 kg/m2     Review of Systems   Constitutional: Negative. Respiratory: Negative. Cardiovascular: Negative for chest pain, palpitations and leg swelling. Neurological: Positive for headaches. Negative for dizziness, tingling and sensory change. Psychiatric/Behavioral: Positive for depression. Negative for substance abuse and suicidal ideas. The patient is nervous/anxious (mild, intermittent). The patient does not have insomnia. All other systems reviewed and are negative. Physical Exam   Constitutional: No distress. Pregnant. Neck: Neck supple. Cardiovascular: Normal rate, regular rhythm and normal heart sounds. Pulmonary/Chest: Effort normal and breath sounds normal.   Abdominal: Soft. She exhibits no distension. There is no tenderness. There is no guarding. Musculoskeletal: Normal range of motion. She exhibits no edema. Lymphadenopathy:     She has no cervical adenopathy. Neurological: She is alert. Coordination normal.   Skin: Skin is warm and dry. Psychiatric: She has a normal mood and affect. Her behavior is normal. Thought content normal.       ASSESSMENT and PLAN  Diagnoses and all orders for this visit:    1. Migraine without aura and without status migrainosus, not intractable  Stable at present.   Continue fioricet until she reaches 6 months of pregnancy per OB. Will complete FMLA forms and fax to employer. 2. Mild depressive disorder ([facility] [address]  Exacerbated by pregnancy and hormonal changes. Declines medication management. Recommend counseling as scheduled. Will complete FMLA paper work but recommend counselor take over after she has been seen. 3. Obesity, morbid ([facility] [address]  Reviewed healthy diet in pregnancy. 4. JAQUAN (generalized anxiety disorder)    Stable. Follow up 6 months or sooner prn.

## 2018-08-10 ENCOUNTER — TELEPHONE (OUTPATIENT)
Dept: FAMILY MEDICINE CLINIC | Age: 28
End: 2018-08-10

## 2018-08-10 NOTE — TELEPHONE ENCOUNTER
----- Message from Jake Palumbo sent at 8/10/2018  2:36 PM EDT -----  Regarding: Alayna Powell - ANP/ telephone  Nurse Sai Hensley called from [address] in reference to Pt Short Term disability.   Quintin Merick [phone]

## 2018-08-23 ENCOUNTER — TELEPHONE (OUTPATIENT)
Dept: FAMILY MEDICINE CLINIC | Age: 28
End: 2018-08-23

## 2018-08-23 ENCOUNTER — OFFICE VISIT (OUTPATIENT)
Dept: FAMILY MEDICINE CLINIC | Age: 28
End: 2018-08-23

## 2018-08-23 VITALS
SYSTOLIC BLOOD PRESSURE: 118 MMHG | RESPIRATION RATE: 16 BRPM | BODY MASS INDEX: 45.96 KG/M2 | WEIGHT: 269.2 LBS | DIASTOLIC BLOOD PRESSURE: 64 MMHG | TEMPERATURE: 98.1 F | HEIGHT: 64 IN | OXYGEN SATURATION: 100 % | HEART RATE: 90 BPM

## 2018-08-23 DIAGNOSIS — F41.1 GAD (GENERALIZED ANXIETY DISORDER): ICD-10-CM

## 2018-08-23 DIAGNOSIS — G43.009 MIGRAINE WITHOUT AURA AND WITHOUT STATUS MIGRAINOSUS, NOT INTRACTABLE: Primary | ICD-10-CM

## 2018-08-23 RX ORDER — GUAIFENESIN 100 MG/5ML
81 LIQUID (ML) ORAL DAILY
COMMUNITY
End: 2018-12-19

## 2018-08-23 NOTE — TELEPHONE ENCOUNTER
TC to [facility] nurse at [place] at [phone]. Left message to return call regarding [place] paperwork.

## 2018-08-23 NOTE — PROGRESS NOTES
Chief Complaint   Patient presents with    Form Completion     Complete Paper work for [employer]. 1. Have you been to the ER, urgent care clinic since your last visit? Hospitalized since your last visit? No    2. Have you seen or consulted any other health care providers outside of the [address] [place] [address] since your last visit? Include any pap smears or colon screening. Yes to Dr. Sade Herrera last week for OBGYN issues. Pt is 23 weeks pregnant.

## 2018-08-23 NOTE — PROGRESS NOTES
HISTORY OF PRESENT ILLNESS  Jacek Mckeon is a 29 y.o. female. HPI  Presents for [facility] paperwork be completed again. She was seen here on 18 to have forms completed to cover her for 2 weeks of missed work due to depression, anxiety and migraine headaches. Forms were completed and faxed. Patient states employer declined approval of missed days. Needs forms re submitted. Reports anxiety and depression has improved slightly. She has appointment with counselor 18. She is currently 23 weeks pregnant, followed by OB. Migraines still occur quite frequently, almost daily. Taking fioricet and tylenol with some sx relief. She has returned to work. Finds work very overwhelming and stressful. Feels irritable at times. Works at [employer]. Past Medical History:   Diagnosis Date         Acne 3/3/2005    ADD (attention deficit disorder) since 11yo    Allergic rhinitis     JAQUAN (generalized anxiety disorder)     Granuloma faciale     Excised    Hyperhydrosis disorder     Axillary    Migraines 2005    Miscarriage     Gyn Dr Magaly Almeida S/P tonsillectomy and adenoidectomy 2003     Patient Active Problem List   Diagnosis Code    JAQUAN (generalized anxiety disorder) F41.1    Attention deficit hyperactivity disorder (ADHD) F90.9    Migraine without aura and without status migrainosus, not intractable G43.009    Obesity, morbid (H[facility]) E66.01     Current Outpatient Prescriptions   Medication Sig    aspirin 81 mg chewable tablet Take 81 mg by mouth daily.  butalbital-acetaminophen-caffeine (FIORICET, ESGIC) -40 mg per tablet TAKE 1 TABLET BY MOUTH EVERY 4 HOURS AS NEEDED FOR PAIN    albuterol (PROVENTIL HFA, VENTOLIN HFA, PROAIR HFA) 90 mcg/actuation inhaler Take 1 Puff by inhalation every four (4) hours as needed for Wheezing.  prenatal no115/iron/folic acid (PRENATAL 19 PO) Take  by mouth.     aluminum chloride (HYPERCARE) 20 % external solution Apply to affected areas twice daily as needed     No current facility-administered medications for this visit. Social History   Substance Use Topics    Smoking status: Former Smoker     Packs/day: 0.30     Years: 4.00     Types: Cigarettes     Quit date: 5/17/2018    Smokeless tobacco: Never Used    Alcohol use No     Visit Vitals    /64    Pulse 90    Temp 98.1 °F (36.7 °C) (Oral)    Resp 16    Ht 5' 4\" (1.626 m)    Wt 269 lb 3.2 oz (122.1 kg)    SpO2 100%    BMI 46.21 kg/m2     Review of Systems   Neurological: Positive for headaches. Negative for dizziness and focal weakness. Psychiatric/Behavioral: Positive for depression (improved). Negative for suicidal ideas. The patient is nervous/anxious (stable). All other systems reviewed and are negative. Physical Exam   Constitutional: No distress. Neck: Neck supple. Cardiovascular: Normal rate, regular rhythm and normal heart sounds. Pulmonary/Chest: Effort normal and breath sounds normal.   Lymphadenopathy:     She has no cervical adenopathy. Neurological: She is alert. No cranial nerve deficit. Coordination normal.   Psychiatric: She has a normal mood and affect. Her behavior is normal. Thought content normal.       ASSESSMENT and PLAN  Diagnoses and all orders for this visit:    1. Migraine without aura and without status migrainosus, not intractable  Continues to have migraines throughout pregnancy. May continue fioricet and tylenol prn. Stop fioricet at 6 months per OB. 2. JAQUAN (generalized anxiety disorder)  Having increased work stress. Stress reduction and relaxation techniques reviewed. Follow up as scheduled with counselor. Will contact employer to verify how forms should be completed to cover missed work. Greater than 50% of 30 minute visit spent counseling regarding treatment options for JAQUAN, completing forms and coordinating care. Follow up prn.

## 2018-08-30 ENCOUNTER — TELEPHONE (OUTPATIENT)
Dept: FAMILY MEDICINE CLINIC | Age: 28
End: 2018-08-30

## 2018-08-30 NOTE — TELEPHONE ENCOUNTER
----- Message from Sami Peoples sent at 8/30/2018  3:55 PM EDT -----  Regarding: NP [place]/Telephone  Pt returning missed, states that it was in reference to her [facility] paper work. Pt states that she can be emailed [email or web address] contact [phone] .

## 2018-08-30 NOTE — TELEPHONE ENCOUNTER
Patient is calling in regards to [employer] paperwork. She states that her employer is to receive paperwork but states they have not.      Employer  [facility]  Fax: [phone]  Attention: Commendations: Gustavo Zavala    Best call back [phone]

## 2018-12-15 ENCOUNTER — HOSPITAL ENCOUNTER (INPATIENT)
Age: 28
LOS: 4 days | Discharge: HOME | End: 2018-12-19
Attending: OBSTETRICS & GYNECOLOGY | Admitting: ADVANCED PRACTICE MIDWIFE
Payer: COMMERCIAL

## 2018-12-15 ENCOUNTER — ANESTHESIA EVENT (OUTPATIENT)
Dept: LABOR AND DELIVERY | Age: 28
End: 2018-12-15
Payer: COMMERCIAL

## 2018-12-15 ENCOUNTER — ANESTHESIA (OUTPATIENT)
Dept: LABOR AND DELIVERY | Age: 28
End: 2018-12-15
Payer: COMMERCIAL

## 2018-12-15 DIAGNOSIS — Z34.90 PREGNANCY, UNSPECIFIED GESTATIONAL AGE: ICD-10-CM

## 2018-12-15 LAB
A1 MICROGLOB PLACENTAL VAG QL: POSITIVE
BASOPHILS # BLD: 0.1 K/UL (ref 0–0.1)
BASOPHILS NFR BLD: 0 % (ref 0–1)
CONTROL LINE PRESENT?: ABNORMAL
DIFFERENTIAL METHOD BLD: ABNORMAL
EOSINOPHIL # BLD: 0.2 K/UL (ref 0–0.4)
EOSINOPHIL NFR BLD: 1 % (ref 0–7)
ERYTHROCYTE [DISTWIDTH] IN BLOOD BY AUTOMATED COUNT: 15.6 % (ref 11.5–14.5)
EXPIRATION DATE: ABNORMAL
HCT VFR BLD AUTO: 35.3 % (ref 35–47)
HGB BLD-MCNC: 11.5 G/DL (ref 11.5–16)
IMM GRANULOCYTES # BLD: 0.2 K/UL (ref 0–0.04)
IMM GRANULOCYTES NFR BLD AUTO: 1 % (ref 0–0.5)
INTERNAL NEGATIVE CONTROL: ABNORMAL
KIT LOT NO.: ABNORMAL
LYMPHOCYTES # BLD: 3.1 K/UL (ref 0.8–3.5)
LYMPHOCYTES NFR BLD: 19 % (ref 12–49)
MCH RBC QN AUTO: 28.3 PG (ref 26–34)
MCHC RBC AUTO-ENTMCNC: 32.6 G/DL (ref 30–36.5)
MCV RBC AUTO: 86.7 FL (ref 80–99)
MONOCYTES # BLD: 0.9 K/UL (ref 0–1)
MONOCYTES NFR BLD: 5 % (ref 5–13)
NEUTS SEG # BLD: 12.2 K/UL (ref 1.8–8)
NEUTS SEG NFR BLD: 74 % (ref 32–75)
NRBC # BLD: 0 K/UL (ref 0–0.01)
NRBC BLD-RTO: 0 PER 100 WBC
PLATELET # BLD AUTO: 371 K/UL (ref 150–400)
PMV BLD AUTO: 11.3 FL (ref 8.9–12.9)
RBC # BLD AUTO: 4.07 M/UL (ref 3.8–5.2)
WBC # BLD AUTO: 16.5 K/UL (ref 3.6–11)

## 2018-12-15 PROCEDURE — 74011250636 HC RX REV CODE- 250/636: Performed by: ANESTHESIOLOGY

## 2018-12-15 PROCEDURE — 77030003542 HC NDL EPDRL TELE -A

## 2018-12-15 PROCEDURE — 77030009413 HC ELECTRD SCALP COVD -A

## 2018-12-15 PROCEDURE — 74011250636 HC RX REV CODE- 250/636: Performed by: ADVANCED PRACTICE MIDWIFE

## 2018-12-15 PROCEDURE — 74011000258 HC RX REV CODE- 258

## 2018-12-15 PROCEDURE — 77030011943

## 2018-12-15 PROCEDURE — 77030014125 HC TY EPDRL BBMI -B: Performed by: ANESTHESIOLOGY

## 2018-12-15 PROCEDURE — 86901 BLOOD TYPING SEROLOGIC RH(D): CPT

## 2018-12-15 PROCEDURE — 77030010848 HC CATH INTUTR PRSS KOLB -B

## 2018-12-15 PROCEDURE — 65270000029 HC RM PRIVATE

## 2018-12-15 PROCEDURE — 74011250636 HC RX REV CODE- 250/636

## 2018-12-15 PROCEDURE — 4A0HXCZ MEASUREMENT OF PRODUCTS OF CONCEPTION, CARDIAC RATE, EXTERNAL APPROACH: ICD-10-PCS | Performed by: OBSTETRICS & GYNECOLOGY

## 2018-12-15 PROCEDURE — 84112 EVAL AMNIOTIC FLUID PROTEIN: CPT | Performed by: ADVANCED PRACTICE MIDWIFE

## 2018-12-15 PROCEDURE — 85025 COMPLETE CBC W/AUTO DIFF WBC: CPT

## 2018-12-15 PROCEDURE — 99284 EMERGENCY DEPT VISIT MOD MDM: CPT

## 2018-12-15 PROCEDURE — 74011000250 HC RX REV CODE- 250

## 2018-12-15 PROCEDURE — 36415 COLL VENOUS BLD VENIPUNCTURE: CPT

## 2018-12-15 PROCEDURE — 75410000002 HC LABOR FEE PER 1 HR

## 2018-12-15 PROCEDURE — A4300 CATH IMPL VASC ACCESS PORTAL: HCPCS

## 2018-12-15 RX ORDER — NALOXONE HYDROCHLORIDE 0.4 MG/ML
0.4 INJECTION, SOLUTION INTRAMUSCULAR; INTRAVENOUS; SUBCUTANEOUS AS NEEDED
Status: DISCONTINUED | OUTPATIENT
Start: 2018-12-15 | End: 2018-12-15 | Stop reason: SDUPTHER

## 2018-12-15 RX ORDER — FENTANYL CITRATE 50 UG/ML
100 INJECTION, SOLUTION INTRAMUSCULAR; INTRAVENOUS ONCE
Status: COMPLETED | OUTPATIENT
Start: 2018-12-15 | End: 2018-12-16

## 2018-12-15 RX ORDER — DIPHENHYDRAMINE HYDROCHLORIDE 50 MG/ML
25 INJECTION, SOLUTION INTRAMUSCULAR; INTRAVENOUS
Status: DISCONTINUED | OUTPATIENT
Start: 2018-12-15 | End: 2018-12-19 | Stop reason: HOSPADM

## 2018-12-15 RX ORDER — UREA 10 %
100 LOTION (ML) TOPICAL DAILY
COMMUNITY

## 2018-12-15 RX ORDER — MINERAL OIL
OIL (ML) ORAL ONCE
Status: ACTIVE | OUTPATIENT
Start: 2018-12-15 | End: 2018-12-15

## 2018-12-15 RX ORDER — NALBUPHINE HYDROCHLORIDE 10 MG/ML
INJECTION, SOLUTION INTRAMUSCULAR; INTRAVENOUS; SUBCUTANEOUS
Status: COMPLETED
Start: 2018-12-15 | End: 2018-12-15

## 2018-12-15 RX ORDER — FENTANYL CITRATE 50 UG/ML
INJECTION, SOLUTION INTRAMUSCULAR; INTRAVENOUS AS NEEDED
Status: DISCONTINUED | OUTPATIENT
Start: 2018-12-15 | End: 2018-12-16 | Stop reason: HOSPADM

## 2018-12-15 RX ORDER — MAG HYDROX/ALUMINUM HYD/SIMETH 200-200-20
30 SUSPENSION, ORAL (FINAL DOSE FORM) ORAL
Status: DISCONTINUED | OUTPATIENT
Start: 2018-12-15 | End: 2018-12-16 | Stop reason: HOSPADM

## 2018-12-15 RX ORDER — BUPIVACAINE HYDROCHLORIDE 2.5 MG/ML
10 INJECTION, SOLUTION EPIDURAL; INFILTRATION; INTRACAUDAL ONCE
Status: DISPENSED | OUTPATIENT
Start: 2018-12-15 | End: 2018-12-16

## 2018-12-15 RX ORDER — OXYTOCIN/0.9 % SODIUM CHLORIDE 30/500 ML
PLASTIC BAG, INJECTION (ML) INTRAVENOUS
Status: DISPENSED
Start: 2018-12-15 | End: 2018-12-15

## 2018-12-15 RX ORDER — SODIUM CHLORIDE, SODIUM LACTATE, POTASSIUM CHLORIDE, CALCIUM CHLORIDE 600; 310; 30; 20 MG/100ML; MG/100ML; MG/100ML; MG/100ML
125 INJECTION, SOLUTION INTRAVENOUS CONTINUOUS
Status: DISCONTINUED | OUTPATIENT
Start: 2018-12-15 | End: 2018-12-19 | Stop reason: HOSPADM

## 2018-12-15 RX ORDER — NALOXONE HYDROCHLORIDE 0.4 MG/ML
0.4 INJECTION, SOLUTION INTRAMUSCULAR; INTRAVENOUS; SUBCUTANEOUS AS NEEDED
Status: DISCONTINUED | OUTPATIENT
Start: 2018-12-15 | End: 2018-12-16 | Stop reason: HOSPADM

## 2018-12-15 RX ORDER — LANOLIN ALCOHOL/MO/W.PET/CERES
CREAM (GRAM) TOPICAL
COMMUNITY

## 2018-12-15 RX ORDER — OXYTOCIN/0.9 % SODIUM CHLORIDE 30/500 ML
0-25 PLASTIC BAG, INJECTION (ML) INTRAVENOUS
Status: DISCONTINUED | OUTPATIENT
Start: 2018-12-15 | End: 2018-12-19 | Stop reason: HOSPADM

## 2018-12-15 RX ORDER — EPHEDRINE SULFATE 50 MG/ML
10 INJECTION, SOLUTION INTRAVENOUS
Status: DISCONTINUED | OUTPATIENT
Start: 2018-12-15 | End: 2018-12-16 | Stop reason: HOSPADM

## 2018-12-15 RX ORDER — NALBUPHINE HYDROCHLORIDE 10 MG/ML
2.5 INJECTION, SOLUTION INTRAMUSCULAR; INTRAVENOUS; SUBCUTANEOUS
Status: DISCONTINUED | OUTPATIENT
Start: 2018-12-15 | End: 2018-12-19 | Stop reason: HOSPADM

## 2018-12-15 RX ORDER — LIDOCAINE HYDROCHLORIDE AND EPINEPHRINE 15; 5 MG/ML; UG/ML
INJECTION, SOLUTION EPIDURAL
Status: COMPLETED | OUTPATIENT
Start: 2018-12-15 | End: 2018-12-15

## 2018-12-15 RX ORDER — SODIUM CHLORIDE 0.9 % (FLUSH) 0.9 %
5-10 SYRINGE (ML) INJECTION AS NEEDED
Status: DISCONTINUED | OUTPATIENT
Start: 2018-12-15 | End: 2018-12-16 | Stop reason: HOSPADM

## 2018-12-15 RX ORDER — BUPIVACAINE HYDROCHLORIDE 2.5 MG/ML
INJECTION, SOLUTION EPIDURAL; INFILTRATION; INTRACAUDAL AS NEEDED
Status: DISCONTINUED | OUTPATIENT
Start: 2018-12-15 | End: 2018-12-16 | Stop reason: HOSPADM

## 2018-12-15 RX ORDER — VALACYCLOVIR HYDROCHLORIDE 500 MG/1
500 TABLET, FILM COATED ORAL 2 TIMES DAILY
COMMUNITY
End: 2018-12-19

## 2018-12-15 RX ORDER — FENTANYL CITRATE 50 UG/ML
100 INJECTION, SOLUTION INTRAMUSCULAR; INTRAVENOUS ONCE
Status: DISCONTINUED | OUTPATIENT
Start: 2018-12-15 | End: 2018-12-15 | Stop reason: SDUPTHER

## 2018-12-15 RX ORDER — SODIUM CHLORIDE 0.9 % (FLUSH) 0.9 %
5-10 SYRINGE (ML) INJECTION EVERY 8 HOURS
Status: DISCONTINUED | OUTPATIENT
Start: 2018-12-15 | End: 2018-12-16 | Stop reason: HOSPADM

## 2018-12-15 RX ORDER — FENTANYL/BUPIVACAINE/NS/PF 2-1250MCG
1-16 PREFILLED PUMP RESERVOIR EPIDURAL CONTINUOUS
Status: DISCONTINUED | OUTPATIENT
Start: 2018-12-15 | End: 2018-12-16 | Stop reason: HOSPADM

## 2018-12-15 RX ADMIN — Medication 5 ML: at 11:33

## 2018-12-15 RX ADMIN — NALBUPHINE HYDROCHLORIDE 2.5 MG: 10 INJECTION, SOLUTION INTRAMUSCULAR; INTRAVENOUS; SUBCUTANEOUS at 22:58

## 2018-12-15 RX ADMIN — Medication 10 ML/HR: at 19:55

## 2018-12-15 RX ADMIN — SODIUM CHLORIDE, SODIUM LACTATE, POTASSIUM CHLORIDE, AND CALCIUM CHLORIDE 125 ML/HR: 600; 310; 30; 20 INJECTION, SOLUTION INTRAVENOUS at 20:10

## 2018-12-15 RX ADMIN — LIDOCAINE HYDROCHLORIDE AND EPINEPHRINE 4.5 ML: 15; 5 INJECTION, SOLUTION EPIDURAL at 19:39

## 2018-12-15 RX ADMIN — Medication 5 ML: at 05:38

## 2018-12-15 RX ADMIN — OXYTOCIN-SODIUM CHLORIDE 0.9% IV SOLN 30 UNIT/500ML 10 MILLI-UNITS/MIN: 30-0.9/5 SOLUTION at 14:16

## 2018-12-15 RX ADMIN — BUPIVACAINE HYDROCHLORIDE 5 ML: 2.5 INJECTION, SOLUTION EPIDURAL; INFILTRATION; INTRACAUDAL at 19:45

## 2018-12-15 RX ADMIN — OXYTOCIN-SODIUM CHLORIDE 0.9% IV SOLN 30 UNIT/500ML 2 MILLI-UNITS/MIN: 30-0.9/5 SOLUTION at 11:45

## 2018-12-15 RX ADMIN — FENTANYL CITRATE 100 MCG: 50 INJECTION, SOLUTION INTRAMUSCULAR; INTRAVENOUS at 19:42

## 2018-12-15 RX ADMIN — SODIUM CHLORIDE, SODIUM LACTATE, POTASSIUM CHLORIDE, AND CALCIUM CHLORIDE 999 ML/HR: 600; 310; 30; 20 INJECTION, SOLUTION INTRAVENOUS at 19:11

## 2018-12-15 NOTE — PROGRESS NOTES
1615 Bedside and Verbal shift change report given to ROBINSON Bray RN (oncoming nurse) by Rula Fowler RN (offgoing nurse). Report included the following information SBAR, Procedure Summary, Accordion and Recent Results. 0802 EFM resumed. Pt encouraged to rest as she report fatigue. [address] [facility] in to see pt & discuss POC. Will recheck pt around noon and possibly augment with pitocin especially if no significant cervical change. 0815 Pt reports that she is not allergic to oxycodone, only codeine causes shortness of breath & N/V. Pharmacy notified. 0900 Pt sleeping. EFM results:  FHR reactive and 2-5 min ucs. 0940 EFM off. Pt up to BR for am care/shower. Denies ucs. Pt made aware of need to EFM around 1030.    1030 Pt remains off of the unit. 1056 EFM resumed. 1135 IVF resumed. 1145 Pitocin augmentation started at 2 per [facility] CNM's order. 1341 [facility] at bedside. Viewed EFM strip. SVE 2-3/80/-2, cervix posterior. AROM of forebag with small amount of clear fluid. [email or web address] FSE placed per [employer] . Spoke with pt about POC. May be up ambulatory & moving around room on telemetry per Ra's encouragement. 1900 Requesting epidural, bolusing, Ru will MD made aware, as well as Eula . Daiva Poles at bedside for epidural placed. 1943 Epidural placed by Bren Sweeney. Pt with no c/o.    1945 Bedside and Verbal shift change report given to STANTON Barr RN (oncoming nurse) by ROBINSON Bray RN (offgoing nurse). Report included the following information SBAR, Procedure Summary, Accordion and Recent Results.

## 2018-12-15 NOTE — PROGRESS NOTES
Labor Progress Note    Patient seen, fetal heart rate and contraction pattern evaluated. Doing well. Feels UCs but not painful. Difficult to trace on EFm d/t maternal habitus.      Physical Exam:  Cervical Exam: 2-3/80/-2  Membranes:  clear  Uterine Activity: Frequency: Irregular  Fetal Heart Rate: Reactive, 140 moderate variaibility  Accelerations: yes  Decelerations: none  Uterine contractions: irregular    Assessment/Plan:  IUP 38.5  Reassuring FWB  SROM x17 hours, clear, afebrile  Obesity  IUPC/FSE placed  Latent labor, Encouraged to ambulate    Arely Oro CNM

## 2018-12-15 NOTE — H&P
History and Physical    Patient: Sharri Kang MRN: [id number]  SSN: [id number]    YOB: 1990  Age: 29 y.o. Sex: female      Subjective:      Sharri Kang is a 29 y.o. female [id number] at 44w7d with an GIN of 18. She presents to labor and delivery with possible SROM at 0030 this morning. Reports she got out of bed to void and had a large gush of clear fluid. Since that time she has continued to have leaking and needed to wear a towel into labor and delivery and it is saturated. States fluid is clear and without odor. Denies VB and Ctx, endorses good fetal movement. Pregnancy has been complicated by glucose intolerance, with a normal 3 hr GTT. Client also obese. History of HSV per serology, has been on prophylaxis since 36 weeks. Client with elevated BPs at 31 weeks with a P/C ratio of 0.32, but has been without symptoms since and BPs has been consistently within the normal range. Client previous tobacco smoker, stopped 2018. Past Medical History:   Diagnosis Date         Acne 3/3/2005    ADD (attention deficit disorder) since 11yo    Allergic rhinitis     JAQUAN (generalized anxiety disorder)     Granuloma faciale     Excised    Hyperhydrosis disorder     Axillary    Migraines     M[facility]      No past surgical history on file. Family History   Problem Relation Age of Onset    Other Maternal Grandmother         bone spurs    Diabetes Paternal Grandmother     Cancer Paternal Grandfather         prostate     Social History     Tobacco Use    Smoking status: Former Smoker     Packs/day: 0.30     Years: 4.00     Pack years: 1.20     Types: Cigarettes     Last attempt to quit: 2018     Years since quittin.5    Smokeless tobacco: Never Used   Substance Use Topics    Alcohol use: No      Prior to Admission medications    Medication Sig Start Date End Date Taking? Authorizing Provider   aspirin 81 mg chewable tablet Take 81 mg by mouth daily. Provider, Historical   butalbital-acetaminophen-caffeine (FIORICET, ESGIC) -40 mg per tablet TAKE 1 TABLET BY MOUTH EVERY 4 HOURS AS NEEDED FOR PAIN 7/26/18   Deep Sutherland NP   albuterol (PROVENTIL HFA, VENTOLIN HFA, PROAIR HFA) 90 mcg/actuation inhaler Take 1 Puff by inhalation every four (4) hours as needed for Wheezing. 5/14/18   Deep Sutherland NP   prenatal no115/iron/folic acid (PRENATAL 19 PO) Take  by mouth. Provider, Historical   aluminum chloride (HYPERCARE) 20 % external solution Apply to affected areas twice daily as needed 5/22/17   Deep Sutherland NP        Allergies   Allergen Reactions    Codeine Shortness of Breath and Nausea and Vomiting     sweats       Review of Systems:  A comprehensive review of systems was negative except for that written in the History of Present Illness. Objective: There were no vitals filed for this visit. Physical Exam:  GENERAL: alert, cooperative, no distress, appears stated age  HEART: regular rate and rhythm, S1, S2 normal, no murmur, click, rub or gallop  ABDOMEN: soft, non-tender. Bowel sounds normal. No masses,  no organomegaly  EXTREMITIES:  extremities normal, atraumatic, no cyanosis or edema  SKIN: Normal.  NEUROLOGIC: negative  PSYCHIATRIC: WNL    SVE: 2/80/-2, vertex, grossly ruptured, amnisure positive    Sterile speculum exam, no herpetic lesions noted. NST: Monitored for 20 minutes, reactive, cat 1, baseline: 135, positive accels, no decels, moderate variability, occasional contraction.     Assessment:     Hospital Problems  Date Reviewed: 8/23/2018          Codes Class Noted POA    Pregnancy ICD-10-CM: Z34.90  ICD-9-CM: V22.2  12/15/2018 Unknown            SROM at 0030  38w5d  GBS negative  Hx of HSV  A positive  Rubella Immune  Hep B negative  Elevated BP's in pregnancy with proteinuria  Obesity  Glucose intolerance    Plan:     Admit for SROM. Discussed in detail option of pitocin augmentation at this time or expectant management for 12 hours post rupture to allow time for body to progress to labor. Client desires expectant management for 12 hours and is agreeable to pitocin augmentation at that time if not in active labor. IV. NST. Maternal and fetal monitoring per protocol. Monitor for s/s of infection. Anticipate vaginal delivery.      Signed By: Ivanna Evans CNM     December 15, 2018

## 2018-12-15 NOTE — PROGRESS NOTES
[facility] Labor Progress Note     Patient: Nancy Nino MRN: [id number]  SSN: [id number]    YOB: 1990  Age: 29 y.o. Sex: female        Subjective:   Patient resting in bed, family at side providing support. Denies contractions and states she is sleepy. Objective:   Blood pressure 135/81, pulse 89, temperature 97.7 °F (36.5 °C), resp. rate 16, height 5' 4\" (1.626 m), weight 137.9 kg (304 lb), last menstrual period 2018. Fetal heart 130 with intermittent auscultation, Uterine contractions irregular, mild to palpation, resting tone soft, Sterile Vaginal Exam: deferred, fetal presentation vertex, membranes ruptured for continued clear fluid. Patient Vitals for the past 4 hrs:   Temp Pulse Resp BP   12/15/18 0512 97.7 °F (36.5 °C) 89 16 135/81         Assessment:     38w5d  Category 1 fetal heart rate tracing   SROM- awaiting labor, expectant management      Plan:   Client requests sleeping medication, discussed that at this time we are trying to facilitate labor and would be more beneficial to walk and use birthing ball. Client verbalized understanding and agreement. Will walk. Remains agreeable to pitocin at 1230 if not in active labor.    Continue to monitor for s/s of infection  Anticipate     Ivanna Evans CNM

## 2018-12-15 NOTE — PROGRESS NOTES
0144 - Pt to LDR Rm 3 for complaint of SROM at 0036 today (large gush of clear fluid when she stood up, at home). Pt wore a towel in her pants and reports continuous leakage. 0150 - ROBINSON Mejia CNM aware that pt is here, will come assess her soon. 0159 - Amnisure Positive. ROBINSON Trujillo performed speculum exam to check for any HSV lesions (none; pt reports never having an active lesion or outbreak, was just positive per bloodwork). SVE 2/80 (which is the same as in office on Tuesday, per pt). 0745 - Bedside and Verbal shift change report given to ROBINSON Robb RN (oncoming nurse) by Theleslie Bergman RNC (offgoing nurse). Report included the following information SBAR, Kardex, Intake/Output, MAR, Accordion, Recent Results and Med Rec Status.

## 2018-12-15 NOTE — PROGRESS NOTES
Labor Progress Note    Patient seen, fetal heart rate and contraction pattern evaluated. Comfortable. Not really feeling UCs. Agrees with plan to start pitocin now.      Physical Exam:  Cervical Exam: not examined  Membranes:  clear  Uterine Activity: Frequency: Irregular  Fetal Heart Rate: placed back on EFM    Assessment/Plan:  IUP 38.5  Reassuring FWB  Pitocin Augmentation    Bartolo Smith CNM

## 2018-12-15 NOTE — PROGRESS NOTES
Labor Progress Note    Patient seen, fetal heart rate and contraction pattern evaluated. Doing well. Feeling tired. Feeling some Ucs stronger than others. Appears comfortable still. Discussed pitocin at 1230, agrees with plan.      Physical Exam:  Cervical Exam: not examined  Membranes: clear  Uterine Activity: Frequency: Irregular  Fetal Heart Rate: Reactive, 130 moderate variaiblity  Accelerations: yes  Decelerations:none  Uterine contractions: irregular    Assessment/Plan:  IUP 38.5  Reassuing FWB  Pitocin at 1230  Early labor    Cheikh Van CNM

## 2018-12-16 LAB
ABO + RH BLD: NORMAL
BLOOD GROUP ANTIBODIES SERPL: NORMAL
SPECIMEN EXP DATE BLD: NORMAL

## 2018-12-16 PROCEDURE — 77030011943

## 2018-12-16 PROCEDURE — 74011250636 HC RX REV CODE- 250/636: Performed by: ADVANCED PRACTICE MIDWIFE

## 2018-12-16 PROCEDURE — 74011250636 HC RX REV CODE- 250/636: Performed by: OBSTETRICS & GYNECOLOGY

## 2018-12-16 PROCEDURE — 74011250636 HC RX REV CODE- 250/636: Performed by: ANESTHESIOLOGY

## 2018-12-16 PROCEDURE — 75410000002 HC LABOR FEE PER 1 HR

## 2018-12-16 PROCEDURE — 74011250637 HC RX REV CODE- 250/637

## 2018-12-16 PROCEDURE — 77030034849

## 2018-12-16 PROCEDURE — 74011000250 HC RX REV CODE- 250

## 2018-12-16 PROCEDURE — 74011250636 HC RX REV CODE- 250/636

## 2018-12-16 PROCEDURE — 76060000078 HC EPIDURAL ANESTHESIA: Performed by: OBSTETRICS & GYNECOLOGY

## 2018-12-16 PROCEDURE — 75410000003 HC RECOV DEL/VAG/CSECN EA 0.5 HR: Performed by: OBSTETRICS & GYNECOLOGY

## 2018-12-16 PROCEDURE — 76010000391 HC C SECN FIRST 1 HR: Performed by: OBSTETRICS & GYNECOLOGY

## 2018-12-16 PROCEDURE — 74011000258 HC RX REV CODE- 258: Performed by: ADVANCED PRACTICE MIDWIFE

## 2018-12-16 PROCEDURE — 65410000002 HC RM PRIVATE OB

## 2018-12-16 PROCEDURE — 76010000392 HC C SECN EA ADDL 0.5 HR: Performed by: OBSTETRICS & GYNECOLOGY

## 2018-12-16 RX ORDER — LIDOCAINE HYDROCHLORIDE AND EPINEPHRINE 20; 5 MG/ML; UG/ML
INJECTION, SOLUTION EPIDURAL; INFILTRATION; INTRACAUDAL; PERINEURAL
Status: COMPLETED
Start: 2018-12-16 | End: 2018-12-16

## 2018-12-16 RX ORDER — LIDOCAINE HYDROCHLORIDE AND EPINEPHRINE 15; 5 MG/ML; UG/ML
1.5 INJECTION, SOLUTION EPIDURAL ONCE
Status: ACTIVE | OUTPATIENT
Start: 2018-12-16 | End: 2018-12-16

## 2018-12-16 RX ORDER — GENTAMICIN SULFATE 80 MG/100ML
80 INJECTION, SOLUTION INTRAVENOUS EVERY 8 HOURS
Status: DISCONTINUED | OUTPATIENT
Start: 2018-12-16 | End: 2018-12-16

## 2018-12-16 RX ORDER — NALOXONE HYDROCHLORIDE 0.4 MG/ML
0.4 INJECTION, SOLUTION INTRAMUSCULAR; INTRAVENOUS; SUBCUTANEOUS AS NEEDED
Status: DISCONTINUED | OUTPATIENT
Start: 2018-12-16 | End: 2018-12-19 | Stop reason: HOSPADM

## 2018-12-16 RX ORDER — NALBUPHINE HYDROCHLORIDE 10 MG/ML
2.5 INJECTION, SOLUTION INTRAMUSCULAR; INTRAVENOUS; SUBCUTANEOUS
Status: ACTIVE | OUTPATIENT
Start: 2018-12-16 | End: 2018-12-17

## 2018-12-16 RX ORDER — ZOLPIDEM TARTRATE 5 MG/1
5 TABLET ORAL
Status: DISCONTINUED | OUTPATIENT
Start: 2018-12-16 | End: 2018-12-19 | Stop reason: HOSPADM

## 2018-12-16 RX ORDER — PHENYLEPHRINE 10 MG/250 ML(40 MCG/ML)IN 0.9 % SOD.CHLORIDE INTRAVENOUS
Status: DISCONTINUED
Start: 2018-12-16 | End: 2018-12-16 | Stop reason: WASHOUT

## 2018-12-16 RX ORDER — ONDANSETRON 2 MG/ML
4 INJECTION INTRAMUSCULAR; INTRAVENOUS
Status: ACTIVE | OUTPATIENT
Start: 2018-12-16 | End: 2018-12-17

## 2018-12-16 RX ORDER — ACETAMINOPHEN 10 MG/ML
INJECTION, SOLUTION INTRAVENOUS AS NEEDED
Status: DISCONTINUED | OUTPATIENT
Start: 2018-12-16 | End: 2018-12-16 | Stop reason: HOSPADM

## 2018-12-16 RX ORDER — POTASSIUM CITRATE, TRISODIUM CITRATE DIHYDRATE AND CITRIC ACID MONOHYDRATE 550; 500; 334 MG/5ML; MG/5ML; MG/5ML
30 SOLUTION ORAL
Status: DISCONTINUED | OUTPATIENT
Start: 2018-12-16 | End: 2018-12-16 | Stop reason: ALTCHOICE

## 2018-12-16 RX ORDER — FENTANYL CITRATE 50 UG/ML
INJECTION, SOLUTION INTRAMUSCULAR; INTRAVENOUS
Status: COMPLETED
Start: 2018-12-16 | End: 2018-12-16

## 2018-12-16 RX ORDER — ACETAMINOPHEN 325 MG/1
650 TABLET ORAL
Status: DISCONTINUED | OUTPATIENT
Start: 2018-12-16 | End: 2018-12-19 | Stop reason: HOSPADM

## 2018-12-16 RX ORDER — SODIUM CHLORIDE 0.9 % (FLUSH) 0.9 %
5-10 SYRINGE (ML) INJECTION EVERY 8 HOURS
Status: DISCONTINUED | OUTPATIENT
Start: 2018-12-16 | End: 2018-12-19 | Stop reason: HOSPADM

## 2018-12-16 RX ORDER — OXYTOCIN/RINGER'S LACTATE 20/1000 ML
PLASTIC BAG, INJECTION (ML) INTRAVENOUS
Status: DISCONTINUED | OUTPATIENT
Start: 2018-12-16 | End: 2018-12-16 | Stop reason: HOSPADM

## 2018-12-16 RX ORDER — OXYTOCIN/RINGER'S LACTATE 20/1000 ML
999 PLASTIC BAG, INJECTION (ML) INTRAVENOUS ONCE
Status: ACTIVE | OUTPATIENT
Start: 2018-12-16 | End: 2018-12-17

## 2018-12-16 RX ORDER — ONDANSETRON 2 MG/ML
INJECTION INTRAMUSCULAR; INTRAVENOUS
Status: COMPLETED
Start: 2018-12-16 | End: 2018-12-16

## 2018-12-16 RX ORDER — OXYTOCIN/RINGER'S LACTATE 20/1000 ML
PLASTIC BAG, INJECTION (ML) INTRAVENOUS
Status: DISPENSED
Start: 2018-12-16 | End: 2018-12-17

## 2018-12-16 RX ORDER — LIDOCAINE HYDROCHLORIDE AND EPINEPHRINE 15; 5 MG/ML; UG/ML
INJECTION, SOLUTION EPIDURAL
Status: DISCONTINUED
Start: 2018-12-16 | End: 2018-12-16

## 2018-12-16 RX ORDER — IBUPROFEN 400 MG/1
800 TABLET ORAL EVERY 8 HOURS
Status: DISCONTINUED | OUTPATIENT
Start: 2018-12-16 | End: 2018-12-19 | Stop reason: HOSPADM

## 2018-12-16 RX ORDER — BUPIVACAINE HYDROCHLORIDE 5 MG/ML
INJECTION, SOLUTION EPIDURAL; INTRACAUDAL
Status: COMPLETED
Start: 2018-12-16 | End: 2018-12-16

## 2018-12-16 RX ORDER — MORPHINE SULFATE 0.5 MG/ML
INJECTION, SOLUTION EPIDURAL; INTRATHECAL; INTRAVENOUS AS NEEDED
Status: DISCONTINUED | OUTPATIENT
Start: 2018-12-16 | End: 2018-12-16 | Stop reason: HOSPADM

## 2018-12-16 RX ORDER — SODIUM CHLORIDE, SODIUM LACTATE, POTASSIUM CHLORIDE, CALCIUM CHLORIDE 600; 310; 30; 20 MG/100ML; MG/100ML; MG/100ML; MG/100ML
INJECTION, SOLUTION INTRAVENOUS
Status: DISCONTINUED | OUTPATIENT
Start: 2018-12-16 | End: 2018-12-16 | Stop reason: HOSPADM

## 2018-12-16 RX ORDER — LIDOCAINE HYDROCHLORIDE AND EPINEPHRINE 20; 5 MG/ML; UG/ML
INJECTION, SOLUTION EPIDURAL; INFILTRATION; INTRACAUDAL; PERINEURAL AS NEEDED
Status: DISCONTINUED | OUTPATIENT
Start: 2018-12-16 | End: 2018-12-16 | Stop reason: HOSPADM

## 2018-12-16 RX ORDER — ONDANSETRON 2 MG/ML
4 INJECTION INTRAMUSCULAR; INTRAVENOUS
Status: DISCONTINUED | OUTPATIENT
Start: 2018-12-16 | End: 2018-12-19 | Stop reason: HOSPADM

## 2018-12-16 RX ORDER — FENTANYL CITRATE 50 UG/ML
100 INJECTION, SOLUTION INTRAMUSCULAR; INTRAVENOUS ONCE
Status: COMPLETED | OUTPATIENT
Start: 2018-12-16 | End: 2018-12-16

## 2018-12-16 RX ORDER — KETOROLAC TROMETHAMINE 30 MG/ML
30 INJECTION, SOLUTION INTRAMUSCULAR; INTRAVENOUS
Status: DISPENSED | OUTPATIENT
Start: 2018-12-16 | End: 2018-12-17

## 2018-12-16 RX ORDER — MORPHINE SULFATE 10 MG/ML
10 INJECTION, SOLUTION INTRAMUSCULAR; INTRAVENOUS
Status: ACTIVE | OUTPATIENT
Start: 2018-12-16 | End: 2018-12-17

## 2018-12-16 RX ORDER — TRISODIUM CITRATE DIHYDRATE AND CITRIC ACID MONOHYDRATE 500; 334 MG/5ML; MG/5ML
30 SOLUTION ORAL
Status: DISCONTINUED | OUTPATIENT
Start: 2018-12-16 | End: 2018-12-16

## 2018-12-16 RX ORDER — PROPOFOL 10 MG/ML
INJECTION, EMULSION INTRAVENOUS AS NEEDED
Status: DISCONTINUED | OUTPATIENT
Start: 2018-12-16 | End: 2018-12-16 | Stop reason: HOSPADM

## 2018-12-16 RX ORDER — SODIUM CHLORIDE 0.9 % (FLUSH) 0.9 %
5-10 SYRINGE (ML) INJECTION AS NEEDED
Status: DISCONTINUED | OUTPATIENT
Start: 2018-12-16 | End: 2018-12-19 | Stop reason: HOSPADM

## 2018-12-16 RX ORDER — SODIUM CHLORIDE 9 MG/ML
INJECTION, SOLUTION INTRAVENOUS
Status: DISCONTINUED | OUTPATIENT
Start: 2018-12-16 | End: 2018-12-16 | Stop reason: HOSPADM

## 2018-12-16 RX ORDER — SODIUM CHLORIDE, SODIUM LACTATE, POTASSIUM CHLORIDE, CALCIUM CHLORIDE 600; 310; 30; 20 MG/100ML; MG/100ML; MG/100ML; MG/100ML
125 INJECTION, SOLUTION INTRAVENOUS CONTINUOUS
Status: DISCONTINUED | OUTPATIENT
Start: 2018-12-16 | End: 2018-12-19 | Stop reason: HOSPADM

## 2018-12-16 RX ORDER — SIMETHICONE 80 MG
80 TABLET,CHEWABLE ORAL AS NEEDED
Status: DISCONTINUED | OUTPATIENT
Start: 2018-12-16 | End: 2018-12-19 | Stop reason: HOSPADM

## 2018-12-16 RX ORDER — EPHEDRINE SULFATE 50 MG/ML
INJECTION, SOLUTION INTRAVENOUS AS NEEDED
Status: DISCONTINUED | OUTPATIENT
Start: 2018-12-16 | End: 2018-12-16 | Stop reason: HOSPADM

## 2018-12-16 RX ORDER — TRISODIUM CITRATE DIHYDRATE AND CITRIC ACID MONOHYDRATE 500; 334 MG/5ML; MG/5ML
SOLUTION ORAL
Status: COMPLETED
Start: 2018-12-16 | End: 2018-12-16

## 2018-12-16 RX ADMIN — LIDOCAINE HYDROCHLORIDE AND EPINEPHRINE 5 ML: 20; 5 INJECTION, SOLUTION EPIDURAL; INFILTRATION; INTRACAUDAL; PERINEURAL at 12:53

## 2018-12-16 RX ADMIN — SODIUM CHLORIDE, SODIUM LACTATE, POTASSIUM CHLORIDE, AND CALCIUM CHLORIDE 125 ML/HR: 600; 310; 30; 20 INJECTION, SOLUTION INTRAVENOUS at 15:04

## 2018-12-16 RX ADMIN — BUPIVACAINE HYDROCHLORIDE 5 ML: 5 INJECTION, SOLUTION EPIDURAL; INTRACAUDAL; PERINEURAL at 08:29

## 2018-12-16 RX ADMIN — ONDANSETRON 4 MG: 2 INJECTION INTRAMUSCULAR; INTRAVENOUS at 03:55

## 2018-12-16 RX ADMIN — DIPHENHYDRAMINE HYDROCHLORIDE 25 MG: 50 INJECTION, SOLUTION INTRAMUSCULAR; INTRAVENOUS at 18:53

## 2018-12-16 RX ADMIN — OXYTOCIN-SODIUM CHLORIDE 0.9% IV SOLN 30 UNIT/500ML 22 MILLI-UNITS/MIN: 30-0.9/5 SOLUTION at 08:00

## 2018-12-16 RX ADMIN — LIDOCAINE HYDROCHLORIDE AND EPINEPHRINE 5 ML: 20; 5 INJECTION, SOLUTION EPIDURAL; INFILTRATION; INTRACAUDAL; PERINEURAL at 11:30

## 2018-12-16 RX ADMIN — FENTANYL CITRATE 100 MCG: 50 INJECTION, SOLUTION INTRAMUSCULAR; INTRAVENOUS at 10:27

## 2018-12-16 RX ADMIN — FENTANYL CITRATE 100 MCG: 50 INJECTION, SOLUTION INTRAMUSCULAR; INTRAVENOUS at 11:28

## 2018-12-16 RX ADMIN — SODIUM CITRATE AND CITRIC ACID MONOHYDRATE 30 ML: 500; 334 SOLUTION ORAL at 12:02

## 2018-12-16 RX ADMIN — EPHEDRINE SULFATE 10 MG: 50 INJECTION, SOLUTION INTRAVENOUS at 12:16

## 2018-12-16 RX ADMIN — Medication 10 ML/HR: at 02:51

## 2018-12-16 RX ADMIN — MORPHINE SULFATE 5 MG: 0.5 INJECTION, SOLUTION EPIDURAL; INTRATHECAL; INTRAVENOUS at 12:44

## 2018-12-16 RX ADMIN — SODIUM CHLORIDE, SODIUM LACTATE, POTASSIUM CHLORIDE, AND CALCIUM CHLORIDE 125 ML/HR: 600; 310; 30; 20 INJECTION, SOLUTION INTRAVENOUS at 07:05

## 2018-12-16 RX ADMIN — Medication: at 12:39

## 2018-12-16 RX ADMIN — GENTAMICIN SULFATE 80 MG: 80 INJECTION, SOLUTION INTRAVENOUS at 10:10

## 2018-12-16 RX ADMIN — SODIUM CHLORIDE, SODIUM LACTATE, POTASSIUM CHLORIDE, AND CALCIUM CHLORIDE 125 ML/HR: 600; 310; 30; 20 INJECTION, SOLUTION INTRAVENOUS at 04:06

## 2018-12-16 RX ADMIN — KETOROLAC TROMETHAMINE 30 MG: 30 INJECTION, SOLUTION INTRAMUSCULAR at 16:57

## 2018-12-16 RX ADMIN — SODIUM CHLORIDE: 9 INJECTION, SOLUTION INTRAVENOUS at 12:25

## 2018-12-16 RX ADMIN — LIDOCAINE HYDROCHLORIDE AND EPINEPHRINE 5 ML: 20; 5 INJECTION, SOLUTION EPIDURAL; INFILTRATION; INTRACAUDAL; PERINEURAL at 11:32

## 2018-12-16 RX ADMIN — ACETAMINOPHEN 1000 MG: 10 INJECTION, SOLUTION INTRAVENOUS at 12:48

## 2018-12-16 RX ADMIN — AMPICILLIN SODIUM 2 G: 2 INJECTION, POWDER, FOR SOLUTION INTRAMUSCULAR; INTRAVENOUS at 09:36

## 2018-12-16 RX ADMIN — FENTANYL CITRATE 100 MCG: 50 INJECTION, SOLUTION INTRAMUSCULAR; INTRAVENOUS at 11:31

## 2018-12-16 RX ADMIN — Medication 10 ML/HR: at 09:34

## 2018-12-16 RX ADMIN — PROPOFOL 20 MG: 10 INJECTION, EMULSION INTRAVENOUS at 12:19

## 2018-12-16 RX ADMIN — SODIUM CHLORIDE, SODIUM LACTATE, POTASSIUM CHLORIDE, CALCIUM CHLORIDE: 600; 310; 30; 20 INJECTION, SOLUTION INTRAVENOUS at 12:25

## 2018-12-16 RX ADMIN — PROPOFOL 20 MG: 10 INJECTION, EMULSION INTRAVENOUS at 13:04

## 2018-12-16 RX ADMIN — LIDOCAINE HYDROCHLORIDE AND EPINEPHRINE 5 ML: 20; 5 INJECTION, SOLUTION EPIDURAL; INFILTRATION; INTRACAUDAL; PERINEURAL at 11:29

## 2018-12-16 NOTE — ANESTHESIA PREPROCEDURE EVALUATION
Anesthetic History   No history of anesthetic complications            Review of Systems / Medical History  Patient summary reviewed, nursing notes reviewed and pertinent labs reviewed    Pulmonary  Within defined limits                 Neuro/Psych         Headaches and psychiatric history     Cardiovascular                  Exercise tolerance: >4 METS     GI/Hepatic/Renal  Within defined limits              Endo/Other        Morbid obesity     Other Findings              Physical Exam    Airway  Mallampati: III  TM Distance: > 6 cm  Neck ROM: normal range of motion   Mouth opening: Normal     Cardiovascular    Rhythm: regular  Rate: normal         Dental    Dentition: Lower dentition intact and Upper dentition intact     Pulmonary  Breath sounds clear to auscultation               Abdominal  GI exam deferred       Other Findings            Anesthetic Plan    ASA: 3  Anesthesia type: epidural            Anesthetic plan and risks discussed with: Patient

## 2018-12-16 NOTE — PROGRESS NOTES
Labor Progress Note    Patient seen, fetal heart rate and contraction pattern evaluated.  Comfortable with epidural.     Physical Exam:  Cervical Exam: 3-4/90/-2  Membranes:  clear  Uterine Activity: Frequency: 2-3 min, MVUs 190-200  Fetal Heart Rate: Reactive, 140 moderate variability  Accelerations: yes  Decelerations: variable  Uterine contractions: regular    Assessment/Plan:  IUP 38.5  Reassuring FWB  Prolonged SROm x20 hours, afebrile  Epidural  Pitocin infusing    Barbara Leyva CNM

## 2018-12-16 NOTE — PROGRESS NOTES
[facility] Labor Progress Note     Patient: Tashia Mason MRN: [id number]  SSN: [id number]    YOB: 1990  Age: 29 y.o. Sex: female        Subjective:   Patient is feeling better after epidural bolus. Objective:   Blood pressure 142/65, pulse 99, temperature 98.2 °F (36.8 °C), resp. rate 16, height 5' 4\" (1.626 m), weight 137.9 kg (304 lb), last menstrual period 2018, SpO2 100 %, not currently breastfeeding. Fetal heart baseline 145, moderate variability, positive accelerations,  negative decelerations, Uterine contractions q 3-4 minutes,  strong to palpation, resting tone soft, Sterile Vaginal Exam: 8.5 cm dilated/ 80 % effaced/ -1 station, fetal presentation vertex, membranes ruptured for continued clear fluid    Pitocin off    Patient Vitals for the past 4 hrs:   Temp Pulse Resp BP SpO2   18 1030  99  142/65    18 1001     100 %   18 0931     98 %   18 0902  94  142/84    18 0901     98 %   18 0831     99 %   18 0830  (!) 101  167/83    18 0801     99 %   18 0751     100 %   18 0750 98.2 °F (36.8 °C) 94 16 133/71    18 0731     98 %             Assessment:     38w6d  Category 1 fetal heart rate tracing   SROM x 34 hours  Labor- Slow progress      Plan:   Discussed cervial exam and lack of any significant progress. Discussed option of turning pitocin off for 2 hours and then re titrating to adequacy or proceeding with . Discussed that Dr. Johnny Jack can come and evaluate cervix as well to help client make decision. She desires check by Dr. Johnny Jack and then to make decision of how to proceed. Pitocin off.      Christie Merrill CNM

## 2018-12-16 NOTE — PROGRESS NOTES
12/15/18 at 1944 - Bedside and Verbal shift change report given to STANTON Mcneal RNC (oncoming nurse) by ROBINSON Dawn RN (offgoing nurse). Report included the following information SBAR, Kardex, Procedure Summary, Intake/Output and MAR. Pt just received an epidural from Dr Gifty Brown. 2000 - BRE Rodríguez at bedside to assess pt, perform SVE: 3-4/90/-2.    0245 - BRE Gomez updated on pt status; she plans to perform SVE around 0630 unless warranted earlier (ie pt urge to push, FHR indications).

## 2018-12-16 NOTE — PROGRESS NOTES
[facility] Labor Progress Note     Patient: Grace Saini MRN: [id number]  SSN: [id number]    YOB: 1990  Age: 29 y.o. Sex: female        Subjective:   Patient coping well with contractions. Is much more comfortable now. Has talked with family and partner and has decided she would like to proceed with a  at this time. Objective:   Blood pressure 142/65, pulse 99, temperature 98.2 °F (36.8 °C), resp. rate 16, height 5' 4\" (1.626 m), weight 137.9 kg (304 lb), last menstrual period 2018, SpO2 100 %, not currently breastfeeding. Fetal heart baseline 150, moderate variability, positive accelerations, negative decelerations, Uterine contractions irregularly now that pitocin off, mild to palpation, resting tone soft, Sterile Vaginal Exam: 8.5 cm dilated/ 80 % effaced/ -1 station, fetal presentation vertex, membranes ruptured for continued clear fluid. Pitocin off    Patient Vitals for the past 4 hrs:   Temp Pulse Resp BP SpO2   18 1030  99  142/65    18 1001     100 %   18 0931     98 %   18 0902  94  142/84    18 0901     98 %   18 0831     99 %   18 0830  (!) 101  167/83    18 0801     99 %   18 0751     100 %   18 0750 98.2 °F (36.8 °C) 94 16 133/71    18 0731     98 %         Assessment:     38w6d  Category 1 fetal heart rate tracing   SROM x 35 hours  Stalled labor- Failure to progress    Plan:   Client has decided to proceed with . Discussed and all questions answered. Care handed over to Dr. Rachael Ballesteros for primary  for failure to progress.     Sunshine Reid CNM

## 2018-12-16 NOTE — PROGRESS NOTES
Labor Progress Note    Patient seen, fetal heart rate and contraction pattern evaluated. No complaints.     Physical Exam:  Cervical Exam: 4/90/-2  Membranes: clear  Uterine Activity: Frequency: 2-3 min, MVUs approx 200  Fetal Heart Rate: Reactive, 130 moderate variaibility  Accelerations: yes  Decelerations: none  Uterine contractions: regular    Assessment/Plan:  IUP 38.5  Reassuring FWB  Prolonged ROM x23 hours, afebrile  Pitocin infusing at 20mu    Bartolo Smith CNM

## 2018-12-16 NOTE — BRIEF OP NOTE
BRIEF OPERATIVE NOTE    Date of Procedure: 2018   Preoperative Diagnosis: failure to progress, iup at 38 6/7. srom. 24 hours  Postoperative Diagnosis: same, delivered    Procedure(s):   SECTION  Surgeon(s) and Role: Jerrilyn Mcburney, MD - Primary         Surgical Assistant: Lindsay Chavira RN    Surgical Staff:  Circ-1: Susan Forman RN  Circ-2: Jaspreet Vincent RN  Scrub Tech-1: Nava Evans  Float Staff: Araceli Falcon RN  Event Time In Time Out   Incision Start 2018 1229    Incision Close 2018 1311      Anesthesia: Epidural   Estimated Blood Loss: 700 ccC-Section Operative Note    Name: Cristóbal Lord Record Number: [id number]   YOB: 1990  Today's Date: 2018      Pre-operative Diagnosis:IUP at 38.6 weeks, Prolonged SROM, FTP with significant moulding    Post-operative Diagnosis: same, VLFI delivered    Operation: Low Cervical Transverse Procedure(s):   SECTION    Surgeon(s):  Jerrilyn Mcburney, MD    Anesthesia: Epidural    Prophylactic Antibiotics: Ancef  DVT Prophylaxis: Sequential Compression Devices         Fetal Description: damon     Birth Information:   Information for the patient's :  Susan King [[id number]]   Delivery of a   female infant on 2018 at 12:37 PM. Apgars were 8  and 9 . Umbilical Cord: 3 Vessels     Umbilical Cord Events: Nuchal Cord Without Compressions     Placenta:   removal with   appearance. Amniotic Fluid Volume:        Amniotic Fluid Description:  Clear        Umbilical Cord: Nuchal Cord x  1    Placenta:  expressed    Specimens: None           Complications:  prolonged ROM, bilateral uterine extension    Procedure Detail:      After proper patient identification and consent, the patient was taken to the operating room, where epidural anesthesia was administered and found to be adequate. Tolliver catheter had been placed using sterile technique.   The patient was prepped and draped in the normal sterile fashion. The abdomen was entered using the Pfannenstiel technique. The peritoneum was entered sharply well superior to the bladder without any apparent injury. The bladder flap was created without difficulty. A low transverse uterine incision was made with the scalpel and extended with blunt finger dissection. Amniotomy was performed and the fluid was small amount clear. The babys head was then delivered atraumatically. The nose and mouth were suctioned. The cord was clamped and cut and the baby was handed off to Nursing staff in attendance. Placenta was then removed from the uterus. The uterus was curettaged with a moist lap pad and cleared of all clots and debris. The uterine incision was closed with 0 vicryl, double layer  in running locking fashion with good hemostasis assured followed by an embricating stitch o 0 Chromic suture. The posterior cul-de-sac was irrigated with warm normal saline. Good hemostasis was again reassured and the uterus was returned to the abdomen. The anterior pelvis was irrigated with warm normal saline and good hemostasis was again reassured throughout. The rectus muscles were reapproximated with 2-0 vicryl. The fascia was closed with 1 Vicryl in a running fashion. Good hemostasis was assured. The subcutaneous was closed with interrupted 0 plain sutureThe skin was closed with a staple closure. The patient tolerated the procedure well. Sponge, lap, and needle counts were correct times three and the patient and baby were taken to recovery/postpartum room in stable condition.     Linnell Dance, MD  2018  1:39 PM                   Specimens:   ID Type Source Tests Collected by Time Destination   1 :  Placenta   Patrick Brown MD 2018 1310 Discarded      Findings: viable female  in the orriding ROT presentation, nuchal cord x 1   Complications: b/l uterine extensions repaire  Implants: * No implants in log *

## 2018-12-16 NOTE — PROGRESS NOTES
ITSP now comfortable after epidural dosing. Now off pitocin, cx with persistent ant lip and significant moulding. Discussed probable need for  delivery with patient and her family. Pt prefers to proceed with . Risks and benefits reviewed, questions answered.

## 2018-12-16 NOTE — PROGRESS NOTES
Labor Progress Note    Patient seen, fetal heart rate and contraction pattern evaluated. Comfortable s/p epidural bolus, very numb. Not feeling any pressure.     Physical Exam:  Cervical Exam: 9/100/0 by RN at 0600  Membranes:  clear  Uterine Activity: Frequency: 2-4 min  Fetal Heart Rate: Reactive, 140 moderate variaibility  Accelerations: yes  Decelerations: none    Assessment/Plan:  IUP 38.6  Reassuring FWB  Will need to labor down  Anticipate       Cheikh Van CNM

## 2018-12-16 NOTE — PROGRESS NOTES
4744 Bedside and Verbal shift change report given to ROBINSON Trinidad RN (oncoming nurse) by Rula Fowler RN (offgoing nurse). Report included the following information SBAR, Procedure Summary, Intake/Output, Accordion and Recent Results. [address] on call for anesthesia in to dose epidural after multiple pcea doses. Sharif[address]ennifer [address] on call for [facility] in to see pt & discuss POC. Viewed EFM strip. SVE /-1 with edema of cervix & molding of vertex noted. Plans for antibiotics with pt consent. 09 Bilateral side lying release completed. 6352 Ampicillin 2 gm IVPB. [address] at bedside. SVE with little change:     1007 Practice push with pt by Sara Walker,  results in no reduction in cervix. Karthik 1540 remains at bedside talking about POC: VD vs c/s. Pt made aware there that [facility] would be consulted and be here to do a  if that is the way things proceed. Gentamycin 80 mg IVPB. 1015 Pt tearful & c/o right sided sharp abdominal pain. Orlin (ans) called to bolus epid. 34 [place] [place] here to bolus epidural.     [facility] OB on call for [facility], in to see pt. Viewed EFM strip & discuss POC. SVE with persistent anterior lip of cervix palpated & molding of vertex. Options & recommendation made to pt and her boyfriend. Pt left to discuss situation with family. 1115 Jackie/Sage Soria at bedside. Decision to proceed with primary c/s. Charge, anesthesia, and scrub aware. [address] in to dose epidurak. Received orders to pull lidocaine 2% with epi, fentanyl, bicitra, Maurene  to order 3 gm of Ancef preop. 1136 Abdominal prep with irving wipes. Clipping to perineum completed before admission. 1155 Tolliver placed. [address], [address] on unit. Pt moved to OR 1 via bed.    1209 Received to OR 1 via bed     1215 FHR in OR 1 156. Chloroprep    1237 Primary  of live female baby. 1335 Pt to PACU via stretcher.  Received report from [employer]. Reports pt had tachycardia throughout case. 1450 Tachycardia persists. Received orders from Godfrey Cain to give pt 500 cc bolus. 1615 Pt transferred with belongings to [facility]. Holding NB. Accompanied by Maite Valero RN & family. 1640 TRANSFER - OUT REPORT:    Verbal report given to Hulan Eisenmenger RN(name) on [place]  being transferred to [facility](unit) for routine post - op       Report consisted of patients Situation, Background, Assessment and   Recommendations(SBAR). Information from the following report(s) SBAR, Procedure Summary, Intake/Output, Accordion and Recent Results was reviewed with the receiving nurse. Lines:   Peripheral IV 12/15/18 Anterior;Distal;Inferior;Left;Lower Wrist (Active)   Site Assessment Clean, dry, & intact 12/15/2018  8:02 AM   Phlebitis Assessment 0 12/15/2018  8:02 AM   Infiltration Assessment 0 12/15/2018  8:02 AM   Dressing Status Clean, dry, & intact 12/15/2018  8:02 AM   Dressing Type Tape;Transparent 12/15/2018  8:02 AM   Hub Color/Line Status Pink; Infusing;Flushed 12/15/2018 11:35 AM        Opportunity for questions and clarification was provided.       Patient transported with:   Registered Nurse

## 2018-12-16 NOTE — PROGRESS NOTES
[facility] Labor Progress Note     Patient: Chela Rosa MRN: [id number]  SSN: [id number]    YOB: 1990  Age: 29 y.o. Sex: female        Subjective:   Patient coping well with contractions. Is comfortable with epidural. Is feeling a lot of rectal pressure. Objective:   Blood pressure 133/71, pulse 94, temperature 98.2 °F (36.8 °C), resp. rate 16, height 5' 4\" (1.626 m), weight 137.9 kg (304 lb), last menstrual period 2018, SpO2 100 %, not currently breastfeeding. Fetal heart baseline 150, moderate variability, positive accelerations,  negative decelerations, Uterine contractions q 2-3 minutes, strong to palpation, resting tone soft, Sterile Vaginal Exam: 8 cm dilated/ 80% effaced/ -1 station, cervix anterior, fetal presentation vertex, membranes ruptured for continued clear fluid. Pitocin at 25mu/min    Patient Vitals for the past 4 hrs:   Temp Pulse Resp BP SpO2   18 0751     100 %   18 0750 98.2 °F (36.8 °C) 94 16 133/71    18 0616 98.3 °F (36.8 °C) 98 12 118/58 100 %   18 0503  94  124/76        Assessment:     38w6d  Category 1 fetal heart rate tracing   SROM x 32.5 hours, no s/s of infection  Induction with pitocin: no cervical change noted  GBS negative    Plan:   Discussed cervical exam with client and lack of cervical change. Will try maternal position changes and reevaluate in 2 hours. Discussed this POC with Dr. Rob Basurto, is agreement. Discussed with client no s/s of infection at this time, but with prolonged rupture of membranes and lack of cervical change (delivery not imminent) IV antibiotics are recommended. Client in agreement. Amp and Gent ordered.    Anticipate     Jovanny Kerr

## 2018-12-16 NOTE — PROGRESS NOTES
7903 Bedside and Verbal shift change report given to ROBINSON Elmore RN (oncoming nurse) by Rula Fowler RN (offgoing nurse). Report included the following information SBAR, Procedure Summary, Intake/Output, Accordion and Recent Results. [address] on call for anesthesia in to dose epidural after multiple pcea doses. [address] on call for Amaury Siu in to see pt & discuss POC. Viewed EFM strip. SVE /-1 with edema of cervix & molding of vertex noted. Plans for antibiotics with pt consent. 09 Bilateral side lying release completed. 3646 Ampicillin 2 gm IVPB. [address] at bedside. SVE with little change:     1007 Practice push with pt results in no reduction in cervix. [address] remains at bedside talking about POC: VD vs c/s. Pt made aware there that Sherrill Mercado would be consulted and be here to do a  if that is the way things proceed. Gentamycin 80 mg IVPB. 1015 Pt tearful & c/o sharp abdominal pain. Orlin (ans) called to bolus epid.      34 Nilesh Zamudio here to bolus epidural.

## 2018-12-16 NOTE — ANESTHESIA POSTPROCEDURE EVALUATION
Post-Anesthesia Evaluation and Assessment    Patient: Chela Rosa MRN: [id number]  SSN: [id number]    YOB: 1990  Age: 29 y.o. Sex: female      I have evaluated the patient and they are stable and ready for discharge from the PACU. Cardiovascular Function/Vital Signs  Visit Vitals  /57   Pulse (!) 110   Temp 37 °C (98.6 °F)   Resp 23   Ht 5' 4\" (1.626 m)   Wt 137.9 kg (304 lb)   SpO2 99%   Breastfeeding? No   BMI 52.18 kg/m²       Patient is status post Epidural anesthesia for Procedure(s):   SECTION. Nausea/Vomiting: None    Postoperative hydration reviewed and adequate. Pain:  Pain Scale 1: Labor Algorithm/Pain Intensity (18 1015)  Pain Intensity 1: 0 (12/15/18 0802)   Managed    Neurological Status:   Neuro (WDL): Within Defined Limits (12/15/18 0802)   At baseline    Mental Status, Level of Consciousness: Alert and  oriented to person, place, and time    Pulmonary Status:   O2 Device: Room air (18 1334)   Adequate oxygenation and airway patent    Complications related to anesthesia: None    Post-anesthesia assessment completed. No concerns    Signed By: Romayne Ok, MD     2018              Procedure(s):   SECTION. <BSHSIANPOST>    Visit Vitals  /57   Pulse (!) 110   Temp 37 °C (98.6 °F)   Resp 23   Ht 5' 4\" (1.626 m)   Wt 137.9 kg (304 lb)   SpO2 99%   Breastfeeding?  No   BMI 52.18 kg/m²

## 2018-12-16 NOTE — ANESTHESIA PROCEDURE NOTES
Epidural Block    Performed by: Giles Shelton MD  Authorized by:  Giles Shelton MD     Pre-Procedure  Indication: labor epidural    Preanesthetic Checklist: patient identified, risks and benefits discussed, site marked, patient being monitored and timeout performed      Epidural:   Patient position:  Seated  Prep region:  Lumbar  Prep: DuraPrep    Location:  L2-3    Needle and Epidural Catheter:   Needle Type:  Tuohy  Needle Gauge:  17 G  Injection Technique:  Loss of resistance using air and loss of resistance using saline  Attempts:  2  Catheter Size:  20 G  Catheter at Skin Depth (cm):  16  Depth in Epidural Space (cm):  6  Events: no blood with aspiration, no cerebrospinal fluid with aspiration, no paresthesia and negative aspiration test    Test Dose:  Negative    Assessment:   Catheter Secured:  Tegaderm and tape  Insertion:  Uncomplicated  Patient tolerance:  Patient tolerated the procedure well with no immediate complications

## 2018-12-17 LAB
BASOPHILS # BLD: 0 K/UL (ref 0–0.1)
BASOPHILS NFR BLD: 0 % (ref 0–1)
DIFFERENTIAL METHOD BLD: ABNORMAL
EOSINOPHIL # BLD: 0.3 K/UL (ref 0–0.4)
EOSINOPHIL NFR BLD: 1 % (ref 0–7)
ERYTHROCYTE [DISTWIDTH] IN BLOOD BY AUTOMATED COUNT: 15.8 % (ref 11.5–14.5)
HCT VFR BLD AUTO: 26.2 % (ref 35–47)
HGB BLD-MCNC: 8.5 G/DL (ref 11.5–16)
IMM GRANULOCYTES # BLD: 0.2 K/UL (ref 0–0.04)
IMM GRANULOCYTES NFR BLD AUTO: 1 % (ref 0–0.5)
LYMPHOCYTES # BLD: 3.1 K/UL (ref 0.8–3.5)
LYMPHOCYTES NFR BLD: 12 % (ref 12–49)
MCH RBC QN AUTO: 29 PG (ref 26–34)
MCHC RBC AUTO-ENTMCNC: 32.4 G/DL (ref 30–36.5)
MCV RBC AUTO: 89.4 FL (ref 80–99)
MONOCYTES # BLD: 1.3 K/UL (ref 0–1)
MONOCYTES NFR BLD: 5 % (ref 5–13)
NEUTS SEG # BLD: 19.8 K/UL (ref 1.8–8)
NEUTS SEG NFR BLD: 80 % (ref 32–75)
NRBC # BLD: 0 K/UL (ref 0–0.01)
NRBC BLD-RTO: 0 PER 100 WBC
PLATELET # BLD AUTO: 305 K/UL (ref 150–400)
PMV BLD AUTO: 11.1 FL (ref 8.9–12.9)
RBC # BLD AUTO: 2.93 M/UL (ref 3.8–5.2)
WBC # BLD AUTO: 24.6 K/UL (ref 3.6–11)

## 2018-12-17 PROCEDURE — 74011250637 HC RX REV CODE- 250/637: Performed by: OBSTETRICS & GYNECOLOGY

## 2018-12-17 PROCEDURE — 36415 COLL VENOUS BLD VENIPUNCTURE: CPT

## 2018-12-17 PROCEDURE — 85025 COMPLETE CBC W/AUTO DIFF WBC: CPT

## 2018-12-17 PROCEDURE — 65410000002 HC RM PRIVATE OB

## 2018-12-17 PROCEDURE — 74011250636 HC RX REV CODE- 250/636: Performed by: ANESTHESIOLOGY

## 2018-12-17 RX ORDER — HYDROCODONE BITARTRATE AND ACETAMINOPHEN 10; 325 MG/1; MG/1
1 TABLET ORAL
Status: DISCONTINUED | OUTPATIENT
Start: 2018-12-17 | End: 2018-12-19 | Stop reason: HOSPADM

## 2018-12-17 RX ORDER — DOCUSATE SODIUM 100 MG/1
100 CAPSULE, LIQUID FILLED ORAL DAILY
Status: DISCONTINUED | OUTPATIENT
Start: 2018-12-18 | End: 2018-12-19 | Stop reason: HOSPADM

## 2018-12-17 RX ORDER — ADHESIVE BANDAGE
30 BANDAGE TOPICAL DAILY PRN
Status: DISCONTINUED | OUTPATIENT
Start: 2018-12-17 | End: 2018-12-19 | Stop reason: HOSPADM

## 2018-12-17 RX ADMIN — IBUPROFEN 800 MG: 400 TABLET ORAL at 18:22

## 2018-12-17 RX ADMIN — ACETAMINOPHEN 650 MG: 325 TABLET ORAL at 06:29

## 2018-12-17 RX ADMIN — ACETAMINOPHEN 650 MG: 325 TABLET ORAL at 00:38

## 2018-12-17 RX ADMIN — HYDROCODONE BITARTRATE AND ACETAMINOPHEN 1 TABLET: 10; 325 TABLET ORAL at 12:06

## 2018-12-17 RX ADMIN — MAGNESIUM HYDROXIDE 30 ML: 400 SUSPENSION ORAL at 23:06

## 2018-12-17 RX ADMIN — IBUPROFEN 800 MG: 400 TABLET ORAL at 00:38

## 2018-12-17 RX ADMIN — IBUPROFEN 800 MG: 400 TABLET ORAL at 09:39

## 2018-12-17 RX ADMIN — HYDROCODONE BITARTRATE AND ACETAMINOPHEN 1 TABLET: 10; 325 TABLET ORAL at 15:56

## 2018-12-17 RX ADMIN — HYDROCODONE BITARTRATE AND ACETAMINOPHEN 1 TABLET: 10; 325 TABLET ORAL at 20:17

## 2018-12-17 NOTE — PROGRESS NOTES
Post-Operative  Day 1    Arturo Wilkins     Assessment:   Hospital Problems  Date Reviewed: 2018          Codes Class Noted POA    Pregnancy ICD-10-CM: Z34.90  ICD-9-CM: V22.2  12/15/2018 Unknown            Post-Op day 1, stable  1. S/p ltc/s of tlfi  2. Anemia 11.5->8.5 gd/l_stable. Start iron on d/c  3. Leukocytosis 24.6 w/o fever or tachycardia. No s/s of infection. Continue to monitor      Plan:   1. Routine post-operative care   2. Breastfeeding without difficulty      Information for the patient's :  Joseph Perry [[id number]]   , Low Transverse   Patient doing well without significant complaint. Nausea and vomiting resolved, tolerating liquids, passing flatus, has urinated. Current Facility-Administered Medications   Medication Dose Route Frequency    lactated Ringers infusion  125 mL/hr IntraVENous CONTINUOUS    sodium chloride (NS) flush 5-10 mL  5-10 mL IntraVENous Q8H    measles, mumps & rubella Vacc (PF) (M-M-R II) injection 0.5 mL  0.5 mL SubCUTAneous PRIOR TO DISCHARGE    ibuprofen (MOTRIN) tablet 800 mg  800 mg Oral Q8H    oxytocin (PITOCIN) 30 units/500 ml NS  0-25 mumtaz-units/min IntraVENous TITRATE    lactated Ringers infusion  125 mL/hr IntraVENous CONTINUOUS        Vitals:  Visit Vitals  BP 92/62 (BP 1 Location: Left arm, BP Patient Position: At rest)   Pulse 91   Temp 97.8 °F (36.6 °C)   Resp 16   Ht 5' 4\" (1.626 m)   Wt 137.9 kg (304 lb)   LMP 2018   SpO2 97%   Breastfeeding? Unknown   BMI 52.18 kg/m²     Temp (24hrs), Av.2 °F (36.8 °C), Min:97.8 °F (36.6 °C), Max:98.6 °F (37 °C)      Last 24hr Input/Output:    Intake/Output Summary (Last 24 hours) at 2018 0804  Last data filed at 2018 0601  Gross per 24 hour   Intake 3351.57 ml   Output 1750 ml   Net 1601.57 ml          Exam:        Patient without distress.      Abdomen, soft, expected tenderness, fundus firm   Wound dressing intact                Lower extremities are negative for swelling, cords or tenderness. Labs:   Lab Results   Component Value Date/Time    WBC 24.6 (H) 12/17/2018 06:29 AM    WBC 16.5 (H) 12/15/2018 05:40 AM    WBC 11.2 (H) 03/09/2015 11:18 AM    HGB 8.5 (L) 12/17/2018 06:29 AM    HGB 11.5 12/15/2018 05:40 AM    HGB 12.7 03/09/2015 11:18 AM    HCT 26.2 (L) 12/17/2018 06:29 AM    HCT 35.3 12/15/2018 05:40 AM    HCT 38.6 03/09/2015 11:18 AM    PLATELET 277 95/56/1823 06:29 AM    PLATELET 581 29/41/7711 05:40 AM    PLATELET 146 (H) 43/33/7128 11:18 AM       Recent Results (from the past 24 hour(s))   CBC WITH AUTOMATED DIFF    Collection Time: 12/17/18  6:29 AM   Result Value Ref Range    WBC 24.6 (H) 3.6 - 11.0 K/uL    RBC 2.93 (L) 3.80 - 5.20 M/uL    HGB 8.5 (L) 11.5 - 16.0 g/dL    HCT 26.2 (L) 35.0 - 47.0 %    MCV 89.4 80.0 - 99.0 FL    MCH 29.0 26.0 - 34.0 PG    MCHC 32.4 30.0 - 36.5 g/dL    RDW 15.8 (H) 11.5 - 14.5 %    PLATELET 287 822 - 730 K/uL    MPV 11.1 8.9 - 12.9 FL    NRBC 0.0 0  WBC    ABSOLUTE NRBC 0.00 0.00 - 0.01 K/uL    NEUTROPHILS 80 (H) 32 - 75 %    LYMPHOCYTES 12 12 - 49 %    MONOCYTES 5 5 - 13 %    EOSINOPHILS 1 0 - 7 %    BASOPHILS 0 0 - 1 %    IMMATURE GRANULOCYTES 1 (H) 0.0 - 0.5 %    ABS. NEUTROPHILS 19.8 (H) 1.8 - 8.0 K/UL    ABS. LYMPHOCYTES 3.1 0.8 - 3.5 K/UL    ABS. MONOCYTES 1.3 (H) 0.0 - 1.0 K/UL    ABS. EOSINOPHILS 0.3 0.0 - 0.4 K/UL    ABS. BASOPHILS 0.0 0.0 - 0.1 K/UL    ABS. IMM.  GRANS. 0.2 (H) 0.00 - 0.04 K/UL    DF AUTOMATED

## 2018-12-17 NOTE — ROUTINE PROCESS
1930:Bedside and Verbal shift change report given to JOHANA Nugent (oncoming nurse) by Laurie Barr (offgoing nurse). Report included the following information SBAR.     0030: Pt IV infiltrated while attempting to flush IV to give Toradol. Pt does not want another IV. Contacted anesthesiology; per anesthesiology okay to transition pt to PO pain medication. 3953: Pt ambulated to bathroom with nursing assistance x 1 with no complications. 1779: Pt ambulated to bathroom with nursing assistance x 1 with no complications. Tolliver removed and pt voided x 1. Pt DTV by 5060. Pt now up ad jennifer.

## 2018-12-18 PROCEDURE — 74011250637 HC RX REV CODE- 250/637: Performed by: OBSTETRICS & GYNECOLOGY

## 2018-12-18 PROCEDURE — 65410000002 HC RM PRIVATE OB

## 2018-12-18 RX ORDER — IBUPROFEN 800 MG/1
800 TABLET ORAL EVERY 8 HOURS
Qty: 30 TAB | Refills: 1 | Status: SHIPPED | OUTPATIENT
Start: 2018-12-18

## 2018-12-18 RX ORDER — LANOLIN ALCOHOL/MO/W.PET/CERES
1 CREAM (GRAM) TOPICAL 2 TIMES DAILY WITH MEALS
Status: DISCONTINUED | OUTPATIENT
Start: 2018-12-18 | End: 2018-12-19 | Stop reason: HOSPADM

## 2018-12-18 RX ORDER — DOCUSATE SODIUM 100 MG/1
100 CAPSULE, LIQUID FILLED ORAL 2 TIMES DAILY
Qty: 60 CAP | Refills: 2 | Status: SHIPPED | OUTPATIENT
Start: 2018-12-18 | End: 2018-12-29 | Stop reason: ALTCHOICE

## 2018-12-18 RX ORDER — HYDROCODONE BITARTRATE AND ACETAMINOPHEN 10; 325 MG/1; MG/1
1 TABLET ORAL
Qty: 30 TAB | Refills: 0 | Status: SHIPPED | OUTPATIENT
Start: 2018-12-18 | End: 2018-12-19

## 2018-12-18 RX ADMIN — SIMETHICONE CHEW TAB 80 MG 80 MG: 80 TABLET ORAL at 10:17

## 2018-12-18 RX ADMIN — HYDROCODONE BITARTRATE AND ACETAMINOPHEN 1 TABLET: 10; 325 TABLET ORAL at 22:12

## 2018-12-18 RX ADMIN — FERROUS SULFATE TAB 325 MG (65 MG ELEMENTAL FE) 325 MG: 325 (65 FE) TAB at 09:58

## 2018-12-18 RX ADMIN — FERROUS SULFATE TAB 325 MG (65 MG ELEMENTAL FE) 325 MG: 325 (65 FE) TAB at 18:20

## 2018-12-18 RX ADMIN — SIMETHICONE CHEW TAB 80 MG 80 MG: 80 TABLET ORAL at 05:45

## 2018-12-18 RX ADMIN — HYDROCODONE BITARTRATE AND ACETAMINOPHEN 1 TABLET: 10; 325 TABLET ORAL at 14:05

## 2018-12-18 RX ADMIN — DOCUSATE SODIUM 100 MG: 100 CAPSULE, LIQUID FILLED ORAL at 09:58

## 2018-12-18 RX ADMIN — HYDROCODONE BITARTRATE AND ACETAMINOPHEN 1 TABLET: 10; 325 TABLET ORAL at 00:38

## 2018-12-18 RX ADMIN — HYDROCODONE BITARTRATE AND ACETAMINOPHEN 1 TABLET: 10; 325 TABLET ORAL at 18:17

## 2018-12-18 RX ADMIN — SIMETHICONE CHEW TAB 80 MG 80 MG: 80 TABLET ORAL at 19:43

## 2018-12-18 RX ADMIN — HYDROCODONE BITARTRATE AND ACETAMINOPHEN 1 TABLET: 10; 325 TABLET ORAL at 05:45

## 2018-12-18 RX ADMIN — HYDROCODONE BITARTRATE AND ACETAMINOPHEN 1 TABLET: 10; 325 TABLET ORAL at 10:17

## 2018-12-18 RX ADMIN — IBUPROFEN 800 MG: 400 TABLET ORAL at 19:43

## 2018-12-18 RX ADMIN — IBUPROFEN 800 MG: 400 TABLET ORAL at 12:18

## 2018-12-18 RX ADMIN — IBUPROFEN 800 MG: 400 TABLET ORAL at 03:49

## 2018-12-18 NOTE — PROGRESS NOTES
Post-Operative  Day 2    Karrie Pace     Assessment: Post-Op day 2 s/p PLTCS for failure to progress, doing well    Plan:   1. Routine post-operative care  2. Anemia 11.5->8.5 gd/l_stable. Will start iron. 3. Leukocytosis 24.6 w/o fever or tachycardia. No s/s of infection. Continue to monitor      Information for the patient's :  Yelena Erwin [[id number]]   , Low Transverse   Patient doing well without significant complaint. Nausea and vomiting resolved, tolerating liquids, passing flatus, voiding and ambulating without difficulty. Vitals:  Visit Vitals  /72 (BP 1 Location: Right arm, BP Patient Position: At rest)   Pulse (!) 102   Temp 98.1 °F (36.7 °C)   Resp 14   Ht 5' 4\" (1.626 m)   Wt 137.9 kg (304 lb)   LMP 2018   SpO2 97%   Breastfeeding? Unknown   BMI 52.18 kg/m²     Temp (24hrs), Av °F (36.7 °C), Min:97.8 °F (36.6 °C), Max:98.1 °F (36.7 °C)        Exam:        Patient without distress. Abdomen, bowel sounds present, soft, expected tenderness, fundus firm                Wound incision clean, dry and intact. Staples intact. Lower extremities are negative for swelling, cords or tenderness.     Labs:   Lab Results   Component Value Date/Time    WBC 24.6 (H) 2018 06:29 AM    WBC 16.5 (H) 12/15/2018 05:40 AM    WBC 11.2 (H) 2015 11:18 AM    WBC 12.7 (H) 2015 10:15 AM    WBC 12.4 (H) 2012 10:29 AM    WBC 15.1 (H) 10/12/2011 06:47 PM    WBC 14.2 (H) 2010 11:21 AM    WBC 14.9 (H) 10/27/2009 09:35 AM    WBC 12.5 (H) 10/08/2009 08:46 PM    HGB 8.5 (L) 2018 06:29 AM    HGB 11.5 12/15/2018 05:40 AM    HGB 12.7 2015 11:18 AM    HGB 13.5 2015 10:15 AM    HGB 12.5 2012 10:29 AM    HGB 13.8 10/12/2011 06:47 PM    HGB 12.7 2010 11:21 AM    HGB 12.1 10/27/2009 09:35 AM    HGB 12.9 10/08/2009 08:46 PM    HCT 26.2 (L) 2018 06:29 AM    HCT 35.3 12/15/2018 05:40 AM    HCT 38.6 03/09/2015 11:18 AM    HCT 39.4 02/20/2015 10:15 AM    HCT 40.2 09/27/2012 10:29 AM    HCT 41.6 10/12/2011 06:47 PM    HCT 40.1 07/26/2010 11:21 AM    HCT 35.7 10/27/2009 09:35 AM    HCT 38.2 10/08/2009 08:46 PM    PLATELET 745 38/76/7585 06:29 AM    PLATELET 779 46/14/4087 05:40 AM    PLATELET 685 (H) 12/70/3171 11:18 AM    PLATELET 308 (H) 85/67/0481 10:15 AM    PLATELET 247 (H) 20/63/5669 10:29 AM    PLATELET 784 (H) 05/08/1839 06:47 PM    PLATELET 339 (H) 20/31/9476 11:21 AM    PLATELET 698 (H) 32/38/5602 09:35 AM    PLATELET 335 (H) 16/48/3430 08:46 PM       No results found for this or any previous visit (from the past 24 hour(s)).

## 2018-12-18 NOTE — ROUTINE PROCESS
Bedside and Verbal shift change report given to Sara Nair RN (oncoming nurse) by WHITNEY Patel RN (offgoing nurse). Report included the following information SBAR.

## 2018-12-18 NOTE — PROGRESS NOTES
Bedside shift change report given to Raleigh Bell RN (oncoming nurse) by Evan Thao. Mike Cam RN (offgoing nurse). Report included the following information SBAR.

## 2018-12-18 NOTE — PROGRESS NOTES
Bedside and Verbal shift change report given to BRANDON Villegas RN (oncoming nurse) by Lamine Conklin. Jacques Torre RN (offgoing nurse). Report included the following information SBAR.

## 2018-12-18 NOTE — DISCHARGE SUMMARY
Obstetrical Discharge Summary     Name: Sharri Kang MRN: [id number]  SSN: [id number]    YOB: 1990  Age: 29 y.o. Sex: female      Admit Date: 12/15/2018    Discharge Date: 2018     Admitting Physician: Stuart Farrar CNM     Attending Physician:  Mary Beth Gonsalez MD     Admission Diagnoses: Pregnancy    Discharge Diagnoses:   Information for the patient's :  Ko Arce [[id number]]   Delivery of a 3.37 kg female infant via , Low Transverse on 2018 at 12:37 PM  by . Apgars were 8 and 9. Additional Diagnoses:   Hospital Problems  Date Reviewed: 2018          Codes Class Noted POA    Pregnancy ICD-10-CM: Z34.90  ICD-9-CM: V22.2  12/15/2018 Unknown             Lab Results   Component Value Date/Time    Rubella, External Immune 05/15/2018       Hospital Course: Normal hospital course following the delivery with exception of leukocytosis WBC 24.6 with no signs of infection. Patient Instructions:   Current Discharge Medication List      START taking these medications    Details   docusate sodium (COLACE) 100 mg capsule Take 1 Cap by mouth two (2) times a day for 90 days. Qty: 60 Cap, Refills: 2      ibuprofen (MOTRIN) 800 mg tablet Take 1 Tab by mouth every eight (8) hours. Qty: 30 Tab, Refills: 1      HYDROcodone-acetaminophen (NORCO)  mg tablet Take 1 Tab by mouth every four (4) hours as needed. Max Daily Amount: 6 Tabs. Qty: 30 Tab, Refills: 0    Associated Diagnoses:  delivery delivered         CONTINUE these medications which have NOT CHANGED    Details   cyanocobalamin (VITAMIN B-12) 100 mcg tablet Take 100 mcg by mouth daily. ferrous sulfate (IRON) 325 mg (65 mg iron) tablet Take  by mouth Daily (before breakfast). prenatal no115/iron/folic acid (PRENATAL 19 PO) Take  by mouth.       butalbital-acetaminophen-caffeine (FIORICET, ESGIC) -40 mg per tablet TAKE 1 TABLET BY MOUTH EVERY 4 HOURS AS NEEDED FOR PAIN  Qty: 30 Tab, Refills: 0    Associated Diagnoses: Migraine without aura and without status migrainosus, not intractable      albuterol (PROVENTIL HFA, VENTOLIN HFA, PROAIR HFA) 90 mcg/actuation inhaler Take 1 Puff by inhalation every four (4) hours as needed for Wheezing. Qty: 1 Inhaler, Refills: 0    Associated Diagnoses: Seasonal allergic rhinitis, unspecified trigger         STOP taking these medications       valACYclovir (VALTREX) 500 mg tablet Comments:   Reason for Stopping:         aspirin 81 mg chewable tablet Comments:   Reason for Stopping:         aluminum chloride (HYPERCARE) 20 % external solution Comments:   Reason for Stopping:               Disposition at Discharge: Home or self care    Condition at Discharge: Stable    Reference my discharge instructions. Follow-up Appointments   Procedures    FOLLOW UP VISIT Appointment in: One Week With Dr. Matt Leo for incision check     With Dr. Matt Leo for incision check     Standing Status:   Standing     Number of Occurrences:   1     Order Specific Question:   Appointment in     Answer:    One Week        Signed By:  Dayanara Ward MD     December 18, 2018

## 2018-12-19 VITALS
WEIGHT: 293 LBS | OXYGEN SATURATION: 97 % | BODY MASS INDEX: 50.02 KG/M2 | HEIGHT: 64 IN | HEART RATE: 94 BPM | DIASTOLIC BLOOD PRESSURE: 82 MMHG | RESPIRATION RATE: 16 BRPM | TEMPERATURE: 97.7 F | SYSTOLIC BLOOD PRESSURE: 118 MMHG

## 2018-12-19 PROCEDURE — 74011250637 HC RX REV CODE- 250/637: Performed by: OBSTETRICS & GYNECOLOGY

## 2018-12-19 RX ORDER — HYDROMORPHONE HYDROCHLORIDE 2 MG/1
2 TABLET ORAL
Status: COMPLETED | OUTPATIENT
Start: 2018-12-19 | End: 2018-12-19

## 2018-12-19 RX ORDER — HYDROMORPHONE HYDROCHLORIDE 2 MG/1
2 TABLET ORAL
Qty: 30 TAB | Refills: 0 | Status: SHIPPED | OUTPATIENT
Start: 2018-12-19 | End: 2018-12-29 | Stop reason: ALTCHOICE

## 2018-12-19 RX ADMIN — DOCUSATE SODIUM 100 MG: 100 CAPSULE, LIQUID FILLED ORAL at 10:46

## 2018-12-19 RX ADMIN — HYDROCODONE BITARTRATE AND ACETAMINOPHEN 1 TABLET: 10; 325 TABLET ORAL at 02:20

## 2018-12-19 RX ADMIN — IBUPROFEN 800 MG: 400 TABLET ORAL at 03:25

## 2018-12-19 RX ADMIN — SIMETHICONE CHEW TAB 80 MG 80 MG: 80 TABLET ORAL at 08:56

## 2018-12-19 RX ADMIN — HYDROMORPHONE HYDROCHLORIDE 2 MG: 2 TABLET ORAL at 08:56

## 2018-12-19 NOTE — ROUTINE PROCESS
Bedside and Verbal shift change report given to Venancio Doyle RN (oncoming nurse) by Rogelio Mark RN (offgoing nurse). Report included the following information SBAR.

## 2018-12-19 NOTE — ROUTINE PROCESS
Bedside shift change report given to Lui Oro RN (oncoming nurse) by Pat Smyth. Jay Camacho RN (offgoing nurse). Report included the following information SBAR, Kardex, Procedure Summary, Intake/Output, MAR and Recent Results. Patient discharge orders were reviewed and signed at bedside. Esign not working.

## 2018-12-19 NOTE — PROGRESS NOTES
Post-Operative  Day 3    Karrie Pace       Assessment: Post-Op day 3, doing well    Plan:   1. Discharge home today  2. Follow up in office in 6 weeks with Diamante Carroll MD  3. Post partum activity/wound care advised, diet as tolerated  4. Discharge Medications: pain medication per discharge summary and medications prior to admission  5. Anemia 11.5->8.5 gd/l_stable. Will start iron. Says norco not helping w pain. Reviewed her codeine allergy and she states that she had itching so bad that she scratched until there was welts, but no rash initially. Reviewed that we can try dilaudid. Information for the patient's :  Diego Jacobsen [[id number]]   , Low Transverse   Patient doing well without significant complaint. Tolerating diet, passing flatus, voiding and ambulating without difficulty    Vitals:  Visit Vitals  /73 (BP 1 Location: Right arm, BP Patient Position: At rest)   Pulse 91   Temp 97.6 °F (36.4 °C)   Resp 16   Ht 5' 4\" (1.626 m)   Wt 137.9 kg (304 lb)   LMP 2018   SpO2 97%   Breastfeeding? Unknown   BMI 52.18 kg/m²     Temp (24hrs), Av.1 °F (36.7 °C), Min:97.6 °F (36.4 °C), Max:98.6 °F (37 °C)        Exam:        Patient without distress. Abdomen, soft, expected tenderness, fundus firm                Wound incision clean, dry and intact w staples               Lower extremities are symmetric without tenderness, cords or erythema.     Labs:   Lab Results   Component Value Date/Time    WBC 24.6 (H) 2018 06:29 AM    WBC 16.5 (H) 12/15/2018 05:40 AM    WBC 11.2 (H) 2015 11:18 AM    WBC 12.7 (H) 2015 10:15 AM    WBC 12.4 (H) 2012 10:29 AM    WBC 15.1 (H) 10/12/2011 06:47 PM    WBC 14.2 (H) 2010 11:21 AM    WBC 14.9 (H) 10/27/2009 09:35 AM    WBC 12.5 (H) 10/08/2009 08:46 PM    HGB 8.5 (L) 2018 06:29 AM    HGB 11.5 12/15/2018 05:40 AM    HGB 12.7 2015 11:18 AM    HGB 13.5 02/20/2015 10:15 AM    HGB 12.5 09/27/2012 10:29 AM    HGB 13.8 10/12/2011 06:47 PM    HGB 12.7 07/26/2010 11:21 AM    HGB 12.1 10/27/2009 09:35 AM    HGB 12.9 10/08/2009 08:46 PM    HCT 26.2 (L) 12/17/2018 06:29 AM    HCT 35.3 12/15/2018 05:40 AM    HCT 38.6 03/09/2015 11:18 AM    HCT 39.4 02/20/2015 10:15 AM    HCT 40.2 09/27/2012 10:29 AM    HCT 41.6 10/12/2011 06:47 PM    HCT 40.1 07/26/2010 11:21 AM    HCT 35.7 10/27/2009 09:35 AM    HCT 38.2 10/08/2009 08:46 PM    PLATELET 823 95/13/0438 06:29 AM    PLATELET 348 89/19/3947 05:40 AM    PLATELET 792 (H) 26/29/7382 11:18 AM    PLATELET 568 (H) 68/11/9227 10:15 AM    PLATELET 792 (H) 83/18/1077 10:29 AM    PLATELET 249 (H) 64/69/5966 06:47 PM    PLATELET 490 (H) 20/92/2949 11:21 AM    PLATELET 645 (H) 93/19/5771 09:35 AM    PLATELET 622 (H) 45/60/2381 08:46 PM       No results found for this or any previous visit (from the past 24 hour(s)).

## 2018-12-19 NOTE — DISCHARGE SUMMARY
Obstetrical Discharge Summary     Name: Amadeo Reeves MRN: [id number]  SSN: [id number]    YOB: 1990  Age: 29 y.o. Sex: female      Admit Date: 12/15/2018    Discharge Date: 2018     Admitting Physician: Carol Reid CNM     Attending Physician:  Heath Jorge MD     Admission Diagnoses: Pregnancy    Discharge Diagnoses:   Information for the patient's :  Mikaela Aviles [[id number]]   Delivery of a 3.37 kg female infant via , Low Transverse on 2018 at 12:37 PM  by . Apgars were 8 and 9. Additional Diagnoses:   Hospital Problems  Date Reviewed: 2018          Codes Class Noted POA    Pregnancy ICD-10-CM: Z34.90  ICD-9-CM: V22.2  12/15/2018 Unknown             Lab Results   Component Value Date/Time    Rubella, External Immune 05/15/2018       Hospital Course: Normal hospital course following the delivery. Anemia- start iron after BM  [place] - reviewed needs removal in 2 days    Disposition at Discharge: Home or self care    Discharged Condition: Stable    Patient Instructions:   Current Discharge Medication List      START taking these medications    Details   HYDROmorphone (DILAUDID) 2 mg tablet Take 1 Tab by mouth every four (4) hours as needed for Pain. Max Daily Amount: 12 mg.  Qty: 30 Tab, Refills: 0    Associated Diagnoses: Pregnancy, unspecified gestational age      docusate sodium (COLACE) 100 mg capsule Take 1 Cap by mouth two (2) times a day for 90 days. Qty: 60 Cap, Refills: 2      ibuprofen (MOTRIN) 800 mg tablet Take 1 Tab by mouth every eight (8) hours. Qty: 30 Tab, Refills: 1         CONTINUE these medications which have NOT CHANGED    Details   cyanocobalamin (VITAMIN B-12) 100 mcg tablet Take 100 mcg by mouth daily. ferrous sulfate (IRON) 325 mg (65 mg iron) tablet Take  by mouth Daily (before breakfast). prenatal no115/iron/folic acid (PRENATAL 19 PO) Take  by mouth.       butalbital-acetaminophen-caffeine (FIORICET, ESGIC) -40 mg per tablet TAKE 1 TABLET BY MOUTH EVERY 4 HOURS AS NEEDED FOR PAIN  Qty: 30 Tab, Refills: 0    Associated Diagnoses: Migraine without aura and without status migrainosus, not intractable      albuterol (PROVENTIL HFA, VENTOLIN HFA, PROAIR HFA) 90 mcg/actuation inhaler Take 1 Puff by inhalation every four (4) hours as needed for Wheezing. Qty: 1 Inhaler, Refills: 0    Associated Diagnoses: Seasonal allergic rhinitis, unspecified trigger         STOP taking these medications       valACYclovir (VALTREX) 500 mg tablet Comments:   Reason for Stopping:         aspirin 81 mg chewable tablet Comments:   Reason for Stopping:         aluminum chloride (HYPERCARE) 20 % external solution Comments:   Reason for Stopping:               Reference my discharge instructions. Follow-up Appointments   Procedures    FOLLOW UP VISIT Appointment in: One Week With Dr. Nicho Alcala for incision check     With Dr. Nicho Alcala for incision check     Standing Status:   Standing     Number of Occurrences:   1     Order Specific Question:   Appointment in     Answer:    One Week        Signed By:  Herminia Mora MD     December 19, 2018

## 2018-12-19 NOTE — DISCHARGE INSTRUCTIONS
Call office to schedule staple removal in 2 days.  Section: What to Expect at [address] [facility]    A  section, or , is surgery to deliver your baby through a cut, called an incision, that the doctor makes in your lower belly and uterus. You may have some pain in your lower belly and need pain medicine for 1 to 2 weeks. You can expect some vaginal bleeding for several weeks. You will probably need about 6 weeks to fully recover. It is important to take it easy while the incision is healing. Avoid heavy lifting, strenuous activities, or exercises that strain the belly muscles while you are recovering. Ask a family member or friend for help with housework, cooking, and shopping. This care sheet gives you a general idea about how long it will take for you to recover. But each person recovers at a different pace. Follow the steps below to get better as quickly as possible. How can you care for yourself at home? Activity    · Rest when you feel tired. Getting enough sleep will help you recover.     · Try to walk each day. Start by walking a little more than you did the day before. Bit by bit, increase the amount you walk. Walking boosts blood flow and helps prevent pneumonia, constipation, and blood clots.     · Avoid strenuous activities, such as bicycle riding, jogging, weightlifting, and aerobic exercise, for 6 weeks or until your doctor says it is okay.     · Until your doctor says it is okay, do not lift anything heavier than your baby.     · Do not do sit-ups or other exercises that strain the belly muscles for 6 weeks or until your doctor says it is okay.     · Hold a pillow over your incision when you cough or take deep breaths. This will support your belly and decrease your pain.     · You may shower as usual. Pat the incision dry when you are done.     · You will have some vaginal bleeding. Wear sanitary pads.  Do not douche or use tampons until your doctor says it is okay.     · Ask your doctor when you can drive again.     · You will probably need to take at least 6 weeks off work. It depends on the type of work you do and how you feel.     · Ask your doctor when it is okay for you to have sex. Diet    · You can eat your normal diet. If your stomach is upset, try bland, low-fat foods like plain rice, broiled chicken, toast, and yogurt.     · Drink plenty of fluids (unless your doctor tells you not to).     · You may notice that your bowel movements are not regular right after your surgery. This is common. Try to avoid constipation and straining with bowel movements. You may want to take a fiber supplement every day. If you have not had a bowel movement after a couple of days, ask your doctor about taking a mild laxative.     · If you are breastfeeding, limit alcohol. Alcohol can cause a lack of energy and other health problems for the baby when a breastfeeding woman drinks heavily. It can also get in the way of a mom's ability to feed her baby or to care for the child in other ways. There isn't a lot of research about exactly how much alcohol can harm a baby. Having no alcohol is the safest choice for your baby. If you choose to have a drink now and then, have only one drink, and limit the number of occasions that you have a drink. Wait to breastfeed at least 2 hours after you have a drink to reduce the amount of alcohol the baby may get in the milk. Medicines    · Your doctor will tell you if and when you can restart your medicines. He or she will also give you instructions about taking any new medicines.     · If you take blood thinners, such as warfarin (Coumadin), clopidogrel (Plavix), or aspirin, be sure to talk to your doctor. He or she will tell you if and when to start taking those medicines again. Make sure that you understand exactly what your doctor wants you to do.     · Take pain medicines exactly as directed.   ? If the doctor gave you a prescription medicine for pain, take it as prescribed. ? If you are not taking a prescription pain medicine, ask your doctor if you can take an over-the-counter medicine.     · If you think your pain medicine is making you sick to your stomach:  ? Take your medicine after meals (unless your doctor has told you not to). ? Ask your doctor for a different pain medicine.     · If your doctor prescribed antibiotics, take them as directed. Do not stop taking them just because you feel better. You need to take the full course of antibiotics. Incision care    · If you have strips of tape on the incision, leave the tape on for a week or until it falls off.     · Wash the area daily with warm, soapy water, and pat it dry. Don't use hydrogen peroxide or alcohol, which can slow healing. You may cover the area with a gauze bandage if it weeps or rubs against clothing. Change the bandage every day.     · Keep the area clean and dry. Other instructions    · If you breastfeed your baby, you may be more comfortable while you are healing if you place the baby so that he or she is not resting on your belly. Try tucking your baby under your arm, with his or her body along the side you will be feeding on. Support your baby's upper body with your arm. With that hand you can control your baby's head to bring his or her mouth to your breast. This is sometimes called the football hold. Follow-up care is a key part of your treatment and safety. Be sure to make and go to all appointments, and call your doctor if you are having problems. It's also a good idea to know your test results and keep a list of the medicines you take. When should you call for help? Call 911 anytime you think you may need emergency care.  For example, call if:    · You passed out (lost consciousness).     · You have chest pain, are short of breath, or cough up blood.    Call your doctor now or seek immediate medical care if:    · You have pain that does not get better after you take pain medicine.     · You have severe vaginal bleeding.     · You are dizzy or lightheaded, or you feel like you may faint.     · You have new or worse pain in your belly or pelvis.     · You have loose stitches, or your incision comes open.     · You have symptoms of infection, such as:  ? Increased pain, swelling, warmth, or redness. ? Red streaks leading from the incision. ? Pus draining from the incision. ? A fever.     · You have symptoms of a blood clot in your leg (called a deep vein thrombosis), such as:  ? Pain in your calf, back of the knee, thigh, or groin. ? Redness and swelling in your leg or groin.    Watch closely for changes in your health, and be sure to contact your doctor if:    · You do not get better as expected. Where can you learn more? Go to http://wanda-alden.info/. Enter M806 in the search box to learn more about \" Section: What to Expect at Home. \"  Current as of: 2017  Content Version: 11.8  © 1213-1021 [employer]. Care instructions adapted under license by [employer] (which disclaims liability or warranty for this information). If you have questions about a medical condition or this instruction, always ask your healthcare professional. [address] any warranty or liability for your use of this information.

## 2018-12-27 ENCOUNTER — OFFICE VISIT (OUTPATIENT)
Dept: FAMILY MEDICINE CLINIC | Age: 28
End: 2018-12-27

## 2018-12-27 VITALS
SYSTOLIC BLOOD PRESSURE: 126 MMHG | RESPIRATION RATE: 16 BRPM | BODY MASS INDEX: 47.5 KG/M2 | DIASTOLIC BLOOD PRESSURE: 78 MMHG | HEIGHT: 64 IN | TEMPERATURE: 98.1 F | OXYGEN SATURATION: 96 % | HEART RATE: 86 BPM | WEIGHT: 278.2 LBS

## 2018-12-27 DIAGNOSIS — G43.009 MIGRAINE WITHOUT AURA AND WITHOUT STATUS MIGRAINOSUS, NOT INTRACTABLE: ICD-10-CM

## 2018-12-27 DIAGNOSIS — F90.9 ATTENTION DEFICIT HYPERACTIVITY DISORDER (ADHD), UNSPECIFIED ADHD TYPE: Primary | ICD-10-CM

## 2018-12-27 PROBLEM — Z34.90 PREGNANCY: Status: RESOLVED | Noted: 2018-12-15 | Resolved: 2018-12-27

## 2018-12-27 RX ORDER — BUTALBITAL, ACETAMINOPHEN AND CAFFEINE 50; 325; 40 MG/1; MG/1; MG/1
TABLET ORAL
Qty: 30 TAB | Refills: 1 | Status: SHIPPED | OUTPATIENT
Start: 2018-12-27 | End: 2019-05-09 | Stop reason: SDUPTHER

## 2018-12-27 RX ORDER — DEXTROAMPHETAMINE SACCHARATE, AMPHETAMINE ASPARTATE, DEXTROAMPHETAMINE SULFATE AND AMPHETAMINE SULFATE 5; 5; 5; 5 MG/1; MG/1; MG/1; MG/1
20 TABLET ORAL 2 TIMES DAILY
Qty: 60 TAB | Refills: 0 | Status: SHIPPED | OUTPATIENT
Start: 2018-12-27 | End: 2019-03-28 | Stop reason: SDUPTHER

## 2018-12-27 RX ORDER — DEXTROAMPHETAMINE SACCHARATE, AMPHETAMINE ASPARTATE, DEXTROAMPHETAMINE SULFATE AND AMPHETAMINE SULFATE 5; 5; 5; 5 MG/1; MG/1; MG/1; MG/1
20 TABLET ORAL 2 TIMES DAILY
Qty: 60 TAB | Refills: 0 | Status: SHIPPED | OUTPATIENT
Start: 2018-12-27 | End: 2018-12-27 | Stop reason: SDUPTHER

## 2018-12-27 NOTE — LETTER
Minette Hatchet GPI:[birth year] MR #:[id number] [place] [address] Page 1 of 5 CONTROLLED SUBSTANCE AGREEMENT I may be prescribed medications that are controlled substances as part  of my treatment plan for management of my medical condition(s). The goal of my treatment plan is to maintain and/or improve my health and wellbeing. Because controlled substances have an increased risk of abuse or harm, continual re-evaluation is needed determine if the goals of my treatment plan are being met for my safety and the safety of others. Smith Jones  am entering into this Controlled Substance Agreement with my provider, __________________________________ at [facility] . I understand that successful treatment requires mutual trust and honesty between me and my provider. I understand that there are state and federal laws and regulations which apply to the medications that my provider may prescribe that must be followed. I understand there are risks and benefits ts of taking the medicines that my provider may prescribe. I understand and agree that following this Agreement is necessary in continuing my provider-patient relationship and success of my treatment plan. As a part of my treatment plan, I agree to the following: COMMUNICATION: 
 
1. I will communicate fully with my provider about my medical condition(s), including the effect on my daily life and how well my medications are helping. I will tell my provider all of the medications that I take for any reason, including medications I receive from another health care provider, and will notify my provider about all issues, problems or concerns, including any side effects, which may be related to my medications. I understand that this information allows my provider to adjust my treatment plan to help manage my medical condition.  I understand that this information will become part of my permanent medical record. 2. I will notify my provider if I have a history of alcohol/drug misuse/addiction or if I have had treatment for alcohol/drug addiction in the past, or if I have a new problem with or concern about alcohol/drug use/addiction, because this increases the likelihood of high risk behaviors and may lead to serious medical conditions. 3. Females Only: I will notify my provider if I am or become pregnant, or if I intend to become pregnant, or if I intend to breastfeed. I understand that communication of these issues with my provider is important, due to possible effects my medication could have on an unborn fetus or breastfeeding child. Initials_____ Name:Joanie Noriega IGY:[birth year] MR #:[id number] [place] [address] Page 2 of 5 MISUSE OF MEDICATIONS / DRUGS: 
 
1. I agree to take all controlled substances as prescribed, and will not misuse or abuse any controlled substances prescribed by my provider. For my safety, I will not increase the amount of medicine I take without first talking with and getting permission from my provider. 2. If I have a medical emergency, another health care provider may prescribe me medication. If I seek emergency treatment, I will notify my provider within seventy-two (72) hours. 3. I understand that my provider may discuss my use and/or possible misuse/abuse of controlled substances and alcohol, as appropriate, with any health care provider involved in my care, pharmacist or legal authority. ILLEGAL DRUGS: 
 
1. I will not use illegal drugs of any kind, including but not limited to marijuana, heroin, cocaine, or any prescription drug which is not prescribed to me. DRUG DIVERSION / PRESCRIPTION FRAUD: 
 
1. I will not share, sell, trade, give away, or otherwise misuse my prescriptions or medications. 2. I will not alter any prescriptions provided to me by my provider. SINGLE PROVIDER: 
 
1. I agree that all controlled substances that I take will be prescribed only by my provider (or his/her covering provider) under this Agreement. This agreement does not prevent me from seeking emergency medical treatment or receiving pain management related to a surgery. PROTECTING MEDICATIONS: 
 
1. I am responsible for keeping my prescriptions and medications in a safe and secure place including safeguarding them from loss or theft. I understand that lost, stolen or damaged/destroyed prescriptions or medications will not be replaced. Initials____ Name:Joanie Monge ILL:[birth year] [facility]:[id number] [facility] Page 3 [facility] 5 PRESCRIPTION RENEWALS/REFILLS: 
 
1. I will follow my controlled substance medication schedule as prescribed by my provider. 2. I understand and agree that I will make any requests for renewals or refills of my prescriptions only at the time of an office visit or during my providers regular office hours subject to the prescription refill requirements of the individual practice. 3. I understand that my provider may not call in prescriptions for controlled substances to my pharmacy. 4. I understand that my provider may adjust or discontinue these medications as deemed appropriate for my medical treatment plan. This Agreement does not guarantee the prescription of controlled medications. 5. I agree that if my medications are adjusted or discontinued, I will properly dispose of any remaining medications. I understand that I will be required to dispose of any remaining controlled medications prior to being provided with any prescriptions for other controlled medications. 6. I understand that the renewal of my prescription depends on my medical condition, my consistent participation, and my adherence with my treatment plan and this Agreement. 7. I understand that if I do not keep an appointment with my provider, I may not receive a renewal or refill for my controlled substance medication. PRESCRIPTION MONITORING / DRUG TESTIN. I understand that my provider may require me to provide urine, saliva or blood for testing at any time. I understand that this testing will be used to monitor for safety and adherence with my treatment plan and this Agreement. 2. I understand that my provider may ask me to provide an observed urine specimen, which means that a nurse or other health care provider may watch me provide urine, and I agree to cooperate if I am asked to provide an observed specimen. 3. I understand that if I do not provide urine, saliva or blood samples within two (2) hours of my providers request, or other timeframe decided by my provider, my treatment plan could be changed, or my prescriptions and medications may be changed or ended. 4. I understand that urine, saliva and blood test results will be a part of my permanent medical record. Initials_____ Name:Joanie MCCALLCOLBY:2/3/1992 MR #:[id number] [place] 17 Page 4 of 5 
 
5. I understand that my provider is required to obtain a copy of my State Prescription Monitoring Program () Report at any time in order to safely prescribe medications. 6. I will bring all of my prescribed controlled substance medications in their original bottles to all of my scheduled appointments. 7. I understand that my provider may ask me to come to the practice with all of my prescribed medications for a random pill count at any time. I agree to cooperate if I am asked to come in for a random pill count. I understand that if I do not arrive in the timeframe decided by my provider, my treatment plan could be changed, or my prescriptions and medications may be changed or ended.  
 
COOPERATION WITH INVESTIGATIONS: 
 
 1. I authorize my provider and my pharmacy to cooperate fully with any local, state, or federal law enforcement agency in the investigation of any possible misuse, sale, or other diversion of my controlled substance prescriptions or medications. RISKS: 
 
1. I understand that my level of consciousness may be affected from the use of controlled substances, and I understand that there are risks, benefits, effects and potential alternatives (including no treatment) to the medications that my provider has prescribed. 2. I understand that I may become drowsy, tired, dizzy, constipated, and sick to my stomach, or have changes in my mood or in my sleep while taking my medications. I have talked with my provider about these possible side effects, risks, benefits, and alternative treatments, and my provider has answered all of my questions. 3. I understand that I should not suddenly stop taking my medications without first speaking with my provider. I understand that if I suddenly stop taking my medications, I may experience nausea, vomiting, sweating,anxiety, sleeplessness, itching or other uncomfortable feelings. 4. I will not take my medications with alcohol of any kind, including beer, wine or liquor. I understand that drinking alcohol with my medications increases the chances of side effects, including breathing problems or even death. 5. I understand that if I have a history of alcoholism or other drug addiction I may be at increased risk of addiction to my medications. Signs of addiction might include craving, compulsive use, and continued use despite harm. Since addiction is a disease, I understand my provider may decide to change my medications and refer me to appropriate treatment services. I understand that this information would become part of my permanent medical record. Initials_____ Name:Joanie Daniel PMI:3/[birth year]1 MR #:[id number] [facility]   
 Page 5 of 5  
 
 
6. Females only: Children born to women who regularly take controlled substances are likely to have physical problems and suffer withdrawal symptoms at birth. If I am of child-bearing age, I understand that I should use safe and effective birth control while taking any controlled substances to avoid the impact of medications on an unborn fetus or  child. I agree to notify my provider immediately if I should become pregnant so that my treatment plan can be adjusted. 7. Males only: I understand that chronic use of controlled substances has been associated with low testosterone levels in males which may affect my mood, stamina, sexual desire, and general health. I understand that my provider may order the appropriate laboratory test to determine my testosterone level,and I agree to this testing. ADHERENCE: 
 
1. I understand that if I do not adhere to this Agreement in any way, my provider may change my prescriptions, stop prescribing controlled substances or end our provider-patient relationship. 2. If my provider decides to stop prescribing medication, or decides to end our provider-patient relationship,my provider may require that I taper my medications slowly. If necessary, my provider may also provide a prescription for other medications to treat my withdrawal symptoms. UNDERSTANDING THIS AGREEMENT: 
 
I understand that my provider may adjust or stop my prescriptions for controlled substances based on my medical condition and my treatment plan. I understand that this Agreement does not guarantee that I will be prescribed medications or controlled substances. I understand that controlled substances may be just one part 
of my treatment plan. My initial on each page and my signature below shows that I have read each page of this Agreement, I have had an opportunity to ask questions, and all of my questions have been answered to my satisfaction by my provider. By signing below, I agree to comply with this Agreement, and I understand that if I do not follow the Agreements listed above, my provider may stop 
 
_________________________________________  Date/Time 12/27/2018 8:33 AM   
             (Patient Signature) 
 
________________________________________    Date/Time 12/27/2018 8:33 AM 
 (Parent or Guardian Signature if <18 yrs) 
 
_________________________________________ Date/Time 12/27/2018 8:33 AM 
 (Provider Signature)

## 2018-12-27 NOTE — PROGRESS NOTES
Chief Complaint   Patient presents with    Medication Evaluation     Adderall and Fioricet. 1. Have you been to the ER, urgent care clinic since your last visit? Hospitalized since your last visit? No    2. Have you seen or consulted any other health care providers outside of the [address] [place] [address] since your last visit? Include any pap smears or colon screening.  No

## 2018-12-27 NOTE — PROGRESS NOTES
HISTORY OF PRESENT ILLNESS  Edmundo Reynolds is a 29 y.o. female. HPI  Follow up ADHD and migraine. She is 3 weeks post partum, not breast feeding. Went off medications when she became pregnant. Would like to resume at this time. 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 Adderall helps with focus, organization and staying on task. Migraines occur about 2-3 times per week, usually relieved with fioricet. Has been taking tylenol with little sx relief. Intolerant to triptans. Patient Active Problem List   Diagnosis Code    JAQUAN (generalized anxiety disorder) F41.1    Attention deficit hyperactivity disorder (ADHD) F90.9    Migraine without aura and without status migrainosus, not intractable G43.009    Obesity, morbid (HCC) E66.01     Current Outpatient Medications   Medication Sig    dextroamphetamine-amphetamine (ADDERALL) 20 mg tablet Take 1 Tab (20 mg total) by mouth two (2) times a day. Earliest refill 2/26/19. Max Daily Amount: 40 mg    butalbital-acetaminophen-caffeine (FIORICET, ESGIC) -40 mg per tablet TAKE 1 TABLET BY MOUTH EVERY 4 HOURS AS NEEDED FOR PAIN    docusate sodium (COLACE) 100 mg capsule Take 1 Cap by mouth two (2) times a day for 90 days.  ibuprofen (MOTRIN) 800 mg tablet Take 1 Tab by mouth every eight (8) hours.  cyanocobalamin (VITAMIN B-12) 100 mcg tablet Take 100 mcg by mouth daily.  ferrous sulfate (IRON) 325 mg (65 mg iron) tablet Take  by mouth Daily (before breakfast).  prenatal no115/iron/folic acid (PRENATAL 19 PO) Take  by mouth.  HYDROmorphone (DILAUDID) 2 mg tablet Take 1 Tab by mouth every four (4) hours as needed for Pain. Max Daily Amount: 12 mg.    albuterol (PROVENTIL HFA, VENTOLIN HFA, PROAIR HFA) 90 mcg/actuation inhaler Take 1 Puff by inhalation every four (4) hours as needed for Wheezing. No current facility-administered medications for this visit.       Social History Tobacco Use    Smoking status: Former Smoker     Packs/day: 0.30     Years: 12.00     Pack years: 3.60     Types: Cigarettes     Last attempt to quit: 2018     Years since quittin.6    Smokeless tobacco: Never Used   Substance Use Topics    Alcohol use: No    Drug use: No     Visit Vitals  /78   Pulse 86   Temp 98.1 °F (36.7 °C) (Oral)   Resp 16   Ht 5' 4\" (1.626 m)   Wt 278 lb 3.2 oz (126.2 kg)   SpO2 96%   BMI 47.75 kg/m²       Review of Systems   Respiratory: Negative. Cardiovascular: Negative for chest pain and palpitations. Neurological: Positive for headaches. Negative for tingling, sensory change and focal weakness. Psychiatric/Behavioral: Negative. All other systems reviewed and are negative. Physical Exam   Constitutional:   Overweight. Neck: Neck supple. Cardiovascular: Normal rate, regular rhythm and normal heart sounds. Pulmonary/Chest: Effort normal and breath sounds normal.   Lymphadenopathy:     She has no cervical adenopathy. Skin: Skin is warm and dry. Psychiatric: She has a normal mood and affect. Her behavior is normal. Thought content normal.       ASSESSMENT and PLAN  Diagnoses and all orders for this visit:    1. Attention deficit hyperactivity disorder (ADHD), unspecified ADHD type  -     dextroamphetamine-amphetamine (ADDERALL) 20 mg tablet; Take 1 Tab (20 mg total) by mouth two (2) times a day. Earliest refill 19. Max Daily Amount: 40 mg  Will resume adderall. Medication risks, benefits and potential side effects were reviewed. Patient informed of office policy for controlled substance management including  review and periodic urine drug screen. She is in agreement with policy, contract signed and scanned.       2. Migraine without aura and without status migrainosus, not intractable  -     butalbital-acetaminophen-caffeine (FIORICET, ESGIC) -40 mg per tablet; TAKE 1 TABLET BY MOUTH EVERY 4 HOURS AS NEEDED FOR PAIN  Recommend trial ibuprofen 600mg or naproxen 2 tabs prn at first sign of headache, reserve fioricet for more severe headache. Medication risks for rebound headache, benefits and potential side effects were reviewed. Follow up 3 months or sooner prn.

## 2018-12-29 ENCOUNTER — OFFICE VISIT (OUTPATIENT)
Dept: FAMILY MEDICINE CLINIC | Age: 28
End: 2018-12-29

## 2018-12-29 VITALS
DIASTOLIC BLOOD PRESSURE: 87 MMHG | RESPIRATION RATE: 18 BRPM | TEMPERATURE: 98.3 F | HEART RATE: 89 BPM | SYSTOLIC BLOOD PRESSURE: 134 MMHG | HEIGHT: 64 IN | BODY MASS INDEX: 46.26 KG/M2 | WEIGHT: 271 LBS | OXYGEN SATURATION: 98 %

## 2018-12-29 DIAGNOSIS — R52 BODY ACHES: Primary | ICD-10-CM

## 2018-12-29 LAB
QUICKVUE INFLUENZA TEST: NEGATIVE
VALID INTERNAL CONTROL?: YES

## 2018-12-29 RX ORDER — PREDNISONE 10 MG/1
10 TABLET ORAL
Qty: 5 TAB | Refills: 0 | Status: SHIPPED | OUTPATIENT
Start: 2018-12-29 | End: 2019-03-28 | Stop reason: ALTCHOICE

## 2018-12-29 NOTE — PROGRESS NOTES
Assessment/Plan:     Diagnoses and all orders for this visit:    1. Body aches  -     AMB POC RAPID INFLUENZA TEST is negative. 2. Cold  -     predniSONE (DELTASONE) 10 mg tablet; Take 10 mg by mouth daily (with breakfast). Treatment as above. Discussed symptom medic care including fluids and rest.  Follow-up if no improvement. Follow-up Disposition:  Return if symptoms worsen or fail to improve. Discussed expected course/resolution/complications of diagnosis in detail with patient.    Medication risks/benefits/costs/interactions/alternatives discussed with patient.    Pt was given after visit summary which includes diagnoses, current medications & vitals. Pt expressed understanding with the diagnosis and plan          Subjective:      Tommie Monge is a 29 y.o. female who presents for had concerns including Cold Symptoms (congestion, headache tried otc dayquil and vicks). Upper Respiratory Infection  Patient complains of symptoms of a URI. Symptoms include congestion. Onset of symptoms was 3 days ago, unchanged since that time. She also c/o headache described as aching for the past 3 days . She is drinking plenty of fluids. . Evaluation to date: none. Treatment to date: OTC products, which have been somewhat effective. Current Outpatient Medications   Medication Sig Dispense Refill    predniSONE (DELTASONE) 10 mg tablet Take 10 mg by mouth daily (with breakfast). 5 Tab 0    dextroamphetamine-amphetamine (ADDERALL) 20 mg tablet Take 1 Tab (20 mg total) by mouth two (2) times a day. Earliest refill 2/26/19. Max Daily Amount: 40 mg 60 Tab 0    butalbital-acetaminophen-caffeine (FIORICET, ESGIC) -40 mg per tablet TAKE 1 TABLET BY MOUTH EVERY 4 HOURS AS NEEDED FOR PAIN 30 Tab 1    ibuprofen (MOTRIN) 800 mg tablet Take 1 Tab by mouth every eight (8) hours. 30 Tab 1    cyanocobalamin (VITAMIN B-12) 100 mcg tablet Take 100 mcg by mouth daily.       ferrous sulfate (IRON) 325 mg (65 mg iron) tablet Take  by mouth Daily (before breakfast).  prenatal no115/iron/folic acid (PRENATAL 19 PO) Take  by mouth. Allergies   Allergen Reactions    Codeine Shortness of Breath and Nausea and Vomiting     sweats       ROS:   Review of Systems   Constitutional: Negative for chills, fever and malaise/fatigue. HENT: Positive for congestion. Negative for ear pain, sinus pain and sore throat. Respiratory: Negative for cough, sputum production, shortness of breath and wheezing. Cardiovascular: Negative for chest pain. Neurological: Positive for headaches. Negative for seizures. Endo/Heme/Allergies: Negative for environmental allergies. Objective:     Visit Vitals  /87 (BP 1 Location: Left arm, BP Patient Position: Sitting)   Pulse 89   Temp 98.3 °F (36.8 °C) (Oral)   Resp 18   Ht 5' 4\" (1.626 m)   Wt 271 lb (122.9 kg)   LMP 03/07/2018   SpO2 98%   BMI 46.52 kg/m²       Vitals and Nurse Documentation reviewed. Physical Exam   Constitutional: She has a sickly appearance. No distress. HENT:   Right Ear: Tympanic membrane is not erythematous and not bulging. No middle ear effusion. Left Ear: Tympanic membrane is not erythematous and not bulging. No middle ear effusion. Nose: Mucosal edema present. No rhinorrhea. Right sinus exhibits no maxillary sinus tenderness and no frontal sinus tenderness. Left sinus exhibits no maxillary sinus tenderness and no frontal sinus tenderness. Mouth/Throat: No oropharyngeal exudate or posterior oropharyngeal erythema. Eyes: EOM and lids are normal.   Cardiovascular: S1 normal and S2 normal. Exam reveals no gallop and no friction rub. No murmur heard. Pulmonary/Chest: Breath sounds normal. She has no wheezes. Lymphadenopathy:     She has no cervical adenopathy. Skin: Skin is warm and dry.    Psychiatric: Mood and affect normal.

## 2018-12-29 NOTE — PROGRESS NOTES
Chief Complaint   Patient presents with    Cold Symptoms     congestion, headache tried otc dayquil and vicks     1. Have you been to the ER, urgent care clinic since your last visit? Hospitalized since your last visit? No    2. Have you seen or consulted any other health care providers outside of the [address] [place] [address] since your last visit? Include any pap smears or colon screening.  No

## 2018-12-29 NOTE — PATIENT INSTRUCTIONS

## 2019-03-28 ENCOUNTER — OFFICE VISIT (OUTPATIENT)
Dept: FAMILY MEDICINE CLINIC | Age: 29
End: 2019-03-28

## 2019-03-28 VITALS
BODY MASS INDEX: 46.71 KG/M2 | WEIGHT: 273.6 LBS | DIASTOLIC BLOOD PRESSURE: 78 MMHG | SYSTOLIC BLOOD PRESSURE: 124 MMHG | HEIGHT: 64 IN | HEART RATE: 86 BPM | OXYGEN SATURATION: 98 % | TEMPERATURE: 98.1 F | RESPIRATION RATE: 16 BRPM

## 2019-03-28 DIAGNOSIS — S93.402A SPRAIN OF LEFT ANKLE, UNSPECIFIED LIGAMENT, INITIAL ENCOUNTER: ICD-10-CM

## 2019-03-28 DIAGNOSIS — G43.009 MIGRAINE WITHOUT AURA AND WITHOUT STATUS MIGRAINOSUS, NOT INTRACTABLE: ICD-10-CM

## 2019-03-28 DIAGNOSIS — E66.01 OBESITY, MORBID (HCC): ICD-10-CM

## 2019-03-28 DIAGNOSIS — F90.9 ATTENTION DEFICIT HYPERACTIVITY DISORDER (ADHD), UNSPECIFIED ADHD TYPE: Primary | ICD-10-CM

## 2019-03-28 RX ORDER — DEXTROAMPHETAMINE SACCHARATE, AMPHETAMINE ASPARTATE, DEXTROAMPHETAMINE SULFATE AND AMPHETAMINE SULFATE 5; 5; 5; 5 MG/1; MG/1; MG/1; MG/1
20 TABLET ORAL 2 TIMES DAILY
Qty: 60 TAB | Refills: 0 | Status: SHIPPED | OUTPATIENT
Start: 2019-03-28 | End: 2019-03-28 | Stop reason: SDUPTHER

## 2019-03-28 RX ORDER — DEXTROAMPHETAMINE SACCHARATE, AMPHETAMINE ASPARTATE, DEXTROAMPHETAMINE SULFATE AND AMPHETAMINE SULFATE 5; 5; 5; 5 MG/1; MG/1; MG/1; MG/1
20 TABLET ORAL 2 TIMES DAILY
Qty: 60 TAB | Refills: 0 | Status: SHIPPED | OUTPATIENT
Start: 2019-03-28 | End: 2019-06-10 | Stop reason: SDUPTHER

## 2019-03-28 NOTE — PROGRESS NOTES
HISTORY OF PRESENT ILLNESS  Ricky Weiss is a 29 y.o. female. HPI  3 month follow up medical problems. ADHD. Patient has been out on 12 week maternity leave. Continued to take her adderall for ADHD. She will be resuming her job at [employer] next week. Medication helps with focus, organization and staying on task. Reports no adverse side effects of medication. Migraine headaches. Continues to have headaches every week. Takes naproxen or fioricet with good sx control. C/o injury to left ankle. Reports she twisted her ankle when walking about 1 week ago. Still having some moderate discomfort and swelling. Obesity. Has been working on [employer] Admits to no regular exercise. Past Medical History:   Diagnosis Date         Acne 3/3/2005    ADD (attention deficit disorder) since 11yo    Allergic rhinitis     JAQUAN (generalized anxiety disorder)     Genital herpes     Positive per lab work only, NO hx of an outbreak/active lesion    Granuloma faciale     Excised    Hyperhydrosis disorder     Axillary    Migraines    Mauricio Sauceda S/P tonsillectomy and adenoidectomy 2003     Patient Active Problem List   Diagnosis Code    JAQUAN (generalized anxiety disorder) F41.1    Attention deficit hyperactivity disorder (ADHD) F90.9    Migraine without aura and without status migrainosus, not intractable G43.009    Obesity, morbid ([facility] [address] E66.01     Current Outpatient Medications   Medication Sig    levonorgestrel (MIRENA) 20 mcg/24 hours (5 yrs) 52 mg IUD 1 Device by IntraUTERine route once.  dextroamphetamine-amphetamine (ADDERALL) 20 mg tablet Take 1 Tab by mouth two (2) times a day. Max Daily Amount: 40 mg. Earliest refill 19.     butalbital-acetaminophen-caffeine (FIORICET, ESGIC) -40 mg per tablet TAKE 1 TABLET BY MOUTH EVERY 4 HOURS AS NEEDED FOR PAIN    ibuprofen (MOTRIN) 800 mg tablet Take 1 Tab by mouth every eight (8) hours.    cyanocobalamin (VITAMIN B-12) 100 mcg tablet Take 100 mcg by mouth daily.  ferrous sulfate (IRON) 325 mg (65 mg iron) tablet Take  by mouth Daily (before breakfast). No current facility-administered medications for this visit. Social History     Tobacco Use    Smoking status: Former Smoker     Packs/day: 0.30     Years: 12.00     Pack years: 3.60     Types: Cigarettes     Last attempt to quit: 2018     Years since quittin.9    Smokeless tobacco: Never Used   Substance Use Topics    Alcohol use: No    Drug use: No     Visit Vitals  /78   Pulse 86   Temp 98.1 °F (36.7 °C) (Oral)   Resp 16   Ht 5' 4\" (1.626 m)   Wt 273 lb 9.6 oz (124.1 kg)   SpO2 98%   BMI 46.96 kg/m²     Review of Systems   Constitutional: Negative. Respiratory: Negative for cough and shortness of breath. Cardiovascular: Negative for chest pain, palpitations and leg swelling. Musculoskeletal: Positive for joint pain (left ankle). Neurological: Positive for headaches (intermittent). Psychiatric/Behavioral: Negative for depression, hallucinations, substance abuse and suicidal ideas. The patient is not nervous/anxious and does not have insomnia. All other systems reviewed and are negative. Physical Exam   Constitutional: No distress. Overweight. Neck: Neck supple. Cardiovascular: Normal rate, regular rhythm and normal heart sounds. Pulmonary/Chest: Effort normal and breath sounds normal.   Abdominal: Soft. She exhibits no distension. There is no tenderness. There is no guarding. Musculoskeletal:        Left ankle: She exhibits swelling (lateral, mild). She exhibits normal range of motion. Tenderness. Lateral malleolus and AITFL tenderness found. Achilles tendon normal.   Lymphadenopathy:     She has no cervical adenopathy. Neurological: She is alert. Coordination normal.   Skin: Skin is warm and dry. Psychiatric: She has a normal mood and affect.        ASSESSMENT and PLAN  Diagnoses and all orders for this visit:    1. Attention deficit hyperactivity disorder (ADHD), unspecified ADHD type  -     dextroamphetamine-amphetamine (ADDERALL) 20 mg tablet; Take 1 Tab by mouth two (2) times a day. Max Daily Amount: 40 mg. Earliest refill 5/27/19. Stable on adderall. Given rx for 3 months. Medication risks, benefits and potential side effects were reviewed. 2. Migraine without aura and without status migrainosus, not intractable  Stable on naproxen and fioricet. 3. Obesity, morbid ([address]  Reviewed healthy diet and exercise recommendations. 4. Sprain of left ankle, unspecified ligament, initial encounter  Ice to affected area bid. Avoid high impact activity. Gentle stretches and ROM. Follow up 3 months or sooner prn.

## 2019-03-28 NOTE — PROGRESS NOTES
Chief Complaint   Patient presents with    Attention Deficit Disorder     3 month follow up. 1. Have you been to the ER, urgent care clinic since your last visit? Hospitalized since your last visit? No    2. Have you seen or consulted any other health care providers outside of the [address] [place] [address] since your last visit? Include any pap smears or colon screening.  No

## 2019-04-12 ENCOUNTER — OFFICE VISIT (OUTPATIENT)
Dept: FAMILY MEDICINE CLINIC | Age: 29
End: 2019-04-12

## 2019-04-12 VITALS
HEIGHT: 64 IN | RESPIRATION RATE: 18 BRPM | WEIGHT: 270.4 LBS | BODY MASS INDEX: 46.16 KG/M2 | OXYGEN SATURATION: 96 % | SYSTOLIC BLOOD PRESSURE: 110 MMHG | DIASTOLIC BLOOD PRESSURE: 76 MMHG | TEMPERATURE: 98.1 F | HEART RATE: 87 BPM

## 2019-04-12 DIAGNOSIS — M25.572 ACUTE LEFT ANKLE PAIN: Primary | ICD-10-CM

## 2019-04-12 NOTE — PATIENT INSTRUCTIONS
Ankle Sprain: Rehab Exercises Your Care Instructions Here are some examples of typical rehabilitation exercises for your condition. Start each exercise slowly. Ease off the exercise if you start to have pain. Your doctor or physical therapist will tell you when you can start these exercises and which ones will work best for you. How to do the exercises \"Alphabet\" exercise 1. Trace the alphabet with your toe. This helps your ankle move in all directions. Side-to-side knee swing exercise 1. Sit in a chair with your foot flat on the floor. 2. Slowly move your knee from side to side. Keep your foot pressed flat. 3. Continue this exercise for 2 to 3 minutes. Towel curl 1. While sitting, place your foot on a towel on the floor. Scrunch the towel toward you with your toes. 2. Then use your toes to push the towel away from you. 3. To make this exercise more challenging you can put something on the other end of the towel. A can of soup is about the right weight for this. Towel stretch 1. Sit with your legs extended and knees straight. 2. Place a towel around your foot just under the toes. 3. Hold each end of the towel in each hand, with your hands above your knees. 4. Pull back with the towel so that your foot stretches toward you. 5. Hold the position for at least 15 to 30 seconds. 6. Repeat 2 to 4 times a session. Do up to 5 sessions a day. Ankle eversion exercise 1. Start by sitting with your foot flat on the floor. Push your foot outward against a wall or a piece of furniture that doesn't move. Hold for about 6 seconds, and relax. Repeat 8 to 12 times. 2. After you feel comfortable with this, try using rubber tubing looped around the outside of your feet for resistance. Push your foot out to the side against the tubing, and then count to 10 as you slowly bring your foot back to the middle. Repeat 8 to 12 times. Isometric opposition exercises 1. While sitting, put your feet together flat on the floor. 2. Press your injured foot inward against your other foot. Hold for about 6 seconds, and relax. Repeat 8 to 12 times. 3. Then place the heel of your other foot on top of the injured one. Push down with the top heel while trying to push up with your injured foot. Hold for about 6 seconds, and relax. Repeat 8 to 12 times. Resisted ankle inversion 1. Sit on the floor with your good leg crossed over your other leg. 2. Hold both ends of an exercise band and loop the band around the inside of your affected foot. Then press your other foot against the band. 3. Keeping your legs crossed, slowly push your affected foot against the band so that foot moves away from your other foot. Then slowly relax. 4. Repeat 8 to 12 times. Resisted ankle eversion 1. Sit on the floor with your legs straight. 2. Hold both ends of an exercise band and loop the band around the outside of your affected foot. Then press your other foot against the band. 3. Keeping your leg straight, slowly push your affected foot outward against the band and away from your other foot without letting your leg rotate. Then slowly relax. 4. Repeat 8 to 12 times. Resisted ankle dorsiflexion 1. Tie the ends of an exercise band together to form a loop. Attach one end of the loop to a secure object or shut a door on it to hold it in place. (Or you can have someone hold one end of the loop to provide resistance.) 2. While sitting on the floor or in a chair, loop the other end of the band over the top of your affected foot. 3. Keeping your knee and leg straight, slowly flex your foot to pull back on the exercise band, and then slowly relax. 4. Repeat 8 to 12 times. Single-leg balance 1. Stand on a flat surface with your arms stretched out to your sides like you are making the letter \"T. \" Then lift your good leg off the floor, bending it at the knee. If you are not steady on your feet, use one hand to hold on to a chair, counter, or wall. 2. Standing on the leg with your affected ankle, keep that knee straight. Try to balance on that leg for up to 30 seconds. Then rest for up to 10 seconds. 3. Repeat 6 to 8 times. 4. When you can balance on your affected leg for 30 seconds with your eyes open, try to balance on it with your eyes closed. 5. When you can do this exercise with your eyes closed for 30 seconds and with ease and no pain, try standing on a pillow or piece of foam, and repeat steps 1 through 4. Follow-up care is a key part of your treatment and safety. Be sure to make and go to all appointments, and call your doctor if you are having problems. It's also a good idea to know your test results and keep a list of the medicines you take. Where can you learn more? Go to http://wanda-alden.info/. Lillian Barrow in the search box to learn more about \"Ankle Sprain: Rehab Exercises. \" Current as of: September 20, 2018 Content Version: 11.9 © 9749-8102 [employer]. Care instructions adapted under license by [employer] (which disclaims liability or warranty for this information). If you have questions about a medical condition or this instruction, always ask your healthcare professional. [address] any warranty or liability for your use of this information.

## 2019-04-12 NOTE — PROGRESS NOTES
Patient Name: Derrick Ocampo MRN: [id number] SUBJECTIVE Derrick Ocampo is a 34 y.o. female who presents with the following:  
 
Began to have left ankle pain starting 1 month ago. Unable to recall any specific trauma or injury but she has a  baby at home therefore has not been paying attention. Saw her PCP for her symptoms and was told to keep it elevated and ice. Still having pain and swelling along the outside of her left ankle, especially when walking. No prior history of gout. Review of Systems Constitutional: Negative for fever, malaise/fatigue and weight loss. Respiratory: Negative for cough, hemoptysis, shortness of breath and wheezing. Cardiovascular: Negative for chest pain, palpitations, leg swelling and PND. Gastrointestinal: Negative for abdominal pain, constipation, diarrhea, nausea and vomiting. Musculoskeletal: Positive for joint pain. The patient's medications, allergies, past medical history, surgical history, family history and social history were reviewed and updated where appropriate. Prior to Admission medications Medication Sig Start Date End Date Taking? Authorizing Provider  
levonorgestrel (MIRENA) 20 mcg/24 hours (5 yrs) 52 mg IUD 1 Device by IntraUTERine route once. Yes Provider, Historical  
dextroamphetamine-amphetamine (ADDERALL) 20 mg tablet Take 1 Tab by mouth two (2) times a day. Max Daily Amount: 40 mg. Earliest refill 19. 3/28/19  Yes Sergei Sutherland Do, NP  
butalbital-acetaminophen-caffeine (FIORICET, ESGIC) -40 mg per tablet TAKE 1 TABLET BY MOUTH EVERY 4 HOURS AS NEEDED FOR PAIN 18  Yes Sergei Sutherland Do, NP  
ibuprofen (MOTRIN) 800 mg tablet Take 1 Tab by mouth every eight (8) hours. 18  Yes Daniel Ramos MD  
cyanocobalamin (VITAMIN B-12) 100 mcg tablet Take 100 mcg by mouth daily.    Yes Provider, Historical  
ferrous sulfate (IRON) 325 mg (65 mg iron) tablet Take  by mouth Daily (before breakfast). Yes Provider, Historical  
 
 
Allergies Allergen Reactions  Codeine Shortness of Breath and Nausea and Vomiting  
  sweats OBJECTIVE Visit Vitals /76 (BP 1 Location: Right arm, BP Patient Position: Sitting) Pulse 87 Temp 98.1 °F (36.7 °C) (Oral) Resp 18 Ht 5' 4\" (1.626 m) Wt 270 lb 6.4 oz (122.7 kg) SpO2 96% BMI 46.41 kg/m² Physical Exam  
Constitutional: She is oriented to person, place, and time and well-developed, well-nourished, and in no distress. No distress. Eyes: Pupils are equal, round, and reactive to light. Conjunctivae and EOM are normal.  
Musculoskeletal: Normal range of motion. Right ankle: Normal.  
     Left ankle: She exhibits swelling (Mild soft tissue swelling along medial malleolus). She exhibits normal range of motion, no ecchymosis, no deformity, no laceration and normal pulse. Tenderness. Medial malleolus tenderness found. Achilles tendon exhibits no pain and normal Reynolds's test results. Neurological: She is alert and oriented to person, place, and time. Gait normal.  
Skin: Skin is warm and dry. She is not diaphoretic. Psychiatric: Mood, memory, affect and judgment normal.  
Nursing note and vitals reviewed. ASSESSMENT AND PLAN Álvaro Kelly is a 34 y.o. female who presents today for: 
 
1. Acute left ankle pain Will obtain x-ray to rule out fracture. Discussed ankle sprain treatment including exercises, Aircast ankle brace, PRN Tylenol, and RICE therapy. If no improvement, consider ortho referral. 
- XR ANKLE LT MIN 3 V; Future There are no discontinued medications. Medication risks/benefits/costs/interactions/alternatives discussed with patient. Advised patient to call back or return to office if symptoms worsen/change/persist. If patient cannot reach us or should anything more severe/urgent arise he/she should proceed directly to the nearest emergency department. Discussed expected course/resolution/complications of diagnosis in detail with patient. Patient given a written after visit summary which includes his/her diagnoses, current medications and vitals. Patient expressed understanding with the diagnosis and plan. Palma Waite M.D.

## 2019-04-12 NOTE — PROGRESS NOTES
Chief Complaint Patient presents with  Ankle Pain  
  swollen - left 1 month 1. Have you been to the ER, urgent care clinic since your last visit? Hospitalized since your last visit? No 
 
2. Have you seen or consulted any other health care providers outside of the [facility] since your last visit? Include any pap smears or colon screening.  No

## 2019-05-09 DIAGNOSIS — G43.009 MIGRAINE WITHOUT AURA AND WITHOUT STATUS MIGRAINOSUS, NOT INTRACTABLE: ICD-10-CM

## 2019-05-09 RX ORDER — BUTALBITAL, ACETAMINOPHEN AND CAFFEINE 50; 325; 40 MG/1; MG/1; MG/1
TABLET ORAL
Qty: 30 TAB | Refills: 1 | Status: SHIPPED | OUTPATIENT
Start: 2019-05-09 | End: 2019-07-18 | Stop reason: SDUPTHER

## 2019-06-10 DIAGNOSIS — F90.9 ATTENTION DEFICIT HYPERACTIVITY DISORDER (ADHD), UNSPECIFIED ADHD TYPE: ICD-10-CM

## 2019-06-10 RX ORDER — DEXTROAMPHETAMINE SACCHARATE, AMPHETAMINE ASPARTATE, DEXTROAMPHETAMINE SULFATE AND AMPHETAMINE SULFATE 5; 5; 5; 5 MG/1; MG/1; MG/1; MG/1
20 TABLET ORAL 2 TIMES DAILY
Qty: 60 TAB | Refills: 0 | Status: SHIPPED | OUTPATIENT
Start: 2019-06-26 | End: 2019-07-18 | Stop reason: SDUPTHER

## 2019-07-18 ENCOUNTER — OFFICE VISIT (OUTPATIENT)
Dept: FAMILY MEDICINE CLINIC | Age: 29
End: 2019-07-18

## 2019-07-18 VITALS
HEIGHT: 64 IN | BODY MASS INDEX: 45.24 KG/M2 | WEIGHT: 265 LBS | RESPIRATION RATE: 16 BRPM | HEART RATE: 70 BPM | DIASTOLIC BLOOD PRESSURE: 78 MMHG | TEMPERATURE: 97.9 F | SYSTOLIC BLOOD PRESSURE: 120 MMHG

## 2019-07-18 DIAGNOSIS — F90.9 ATTENTION DEFICIT HYPERACTIVITY DISORDER (ADHD), UNSPECIFIED ADHD TYPE: Primary | ICD-10-CM

## 2019-07-18 DIAGNOSIS — G43.009 MIGRAINE WITHOUT AURA AND WITHOUT STATUS MIGRAINOSUS, NOT INTRACTABLE: ICD-10-CM

## 2019-07-18 RX ORDER — DEXTROAMPHETAMINE SACCHARATE, AMPHETAMINE ASPARTATE, DEXTROAMPHETAMINE SULFATE AND AMPHETAMINE SULFATE 5; 5; 5; 5 MG/1; MG/1; MG/1; MG/1
20 TABLET ORAL 2 TIMES DAILY
Qty: 60 TAB | Refills: 0 | Status: SHIPPED | OUTPATIENT
Start: 2019-07-25 | End: 2019-07-18 | Stop reason: SDUPTHER

## 2019-07-18 RX ORDER — DEXTROAMPHETAMINE SACCHARATE, AMPHETAMINE ASPARTATE, DEXTROAMPHETAMINE SULFATE AND AMPHETAMINE SULFATE 5; 5; 5; 5 MG/1; MG/1; MG/1; MG/1
20 TABLET ORAL 2 TIMES DAILY
Qty: 60 TAB | Refills: 0 | Status: SHIPPED | OUTPATIENT
Start: 2019-09-25 | End: 2019-10-31 | Stop reason: SDUPTHER

## 2019-07-18 RX ORDER — DEXTROAMPHETAMINE SACCHARATE, AMPHETAMINE ASPARTATE, DEXTROAMPHETAMINE SULFATE AND AMPHETAMINE SULFATE 5; 5; 5; 5 MG/1; MG/1; MG/1; MG/1
20 TABLET ORAL 2 TIMES DAILY
Qty: 60 TAB | Refills: 0 | Status: SHIPPED | OUTPATIENT
Start: 2019-08-25 | End: 2019-07-18 | Stop reason: SDUPTHER

## 2019-07-18 RX ORDER — DEXTROAMPHETAMINE SACCHARATE, AMPHETAMINE ASPARTATE, DEXTROAMPHETAMINE SULFATE AND AMPHETAMINE SULFATE 5; 5; 5; 5 MG/1; MG/1; MG/1; MG/1
TABLET ORAL
COMMUNITY
End: 2019-07-18 | Stop reason: ALTCHOICE

## 2019-07-18 RX ORDER — BUTALBITAL, ACETAMINOPHEN AND CAFFEINE 50; 325; 40 MG/1; MG/1; MG/1
1 TABLET ORAL
Qty: 30 TAB | Refills: 1 | Status: SHIPPED | OUTPATIENT
Start: 2019-07-18 | End: 2019-08-22 | Stop reason: SDUPTHER

## 2019-07-18 NOTE — PROGRESS NOTES
Chief Complaint   Patient presents with    Behavioral Problem     medication refill     Medication Refill     Holly     Other     waiver for car tent      1. Have you been to the ER, urgent care clinic since your last visit? Hospitalized since your last visit? No    2. Have you seen or consulted any other health care providers outside of the [address] [place] [address] since your last visit? Include any pap smears or colon screening.  No

## 2019-07-18 NOTE — PROGRESS NOTES
HISTORY OF PRESENT ILLNESS  Nadia Evans is a 34 y.o. female. HPI  Follow up ADHD and migraine headache. Taking prescribed adderall. She recently got a new job as  for a [employer]. Medication helps with focus, organization and staying on task. Using fioricet for migraine headaches about 2-4 x month with good sx control. Requesting letter verifying she has photosensitivity with migraines in order to have tinted windows on her car. Patient Active Problem List   Diagnosis Code    JAQUAN (generalized anxiety disorder) F41.1    Attention deficit hyperactivity disorder (ADHD) F90.9    Migraine without aura and without status migrainosus, not intractable G43.009    Obesity, morbid ([facility]) E66.01     Current Outpatient Medications   Medication Sig    [START ON 2019] dextroamphetamine-amphetamine (ADDERALL) 20 mg tablet Take 1 Tab by mouth two (2) times a day. Max Daily Amount: 40 mg.    butalbital-acetaminophen-caffeine (FIORICET, ESGIC) -40 mg per tablet Take 1 Tab by mouth every six (6) hours as needed for Pain.  levonorgestrel (MIRENA) 20 mcg/24 hours (5 yrs) 52 mg IUD 1 Device by IntraUTERine route once.  ibuprofen (MOTRIN) 800 mg tablet Take 1 Tab by mouth every eight (8) hours.  cyanocobalamin (VITAMIN B-12) 100 mcg tablet Take 100 mcg by mouth daily.  ferrous sulfate (IRON) 325 mg (65 mg iron) tablet Take  by mouth Daily (before breakfast). No current facility-administered medications for this visit. Social History     Tobacco Use    Smoking status: Former Smoker     Packs/day: 0.30     Years: 12.00     Pack years: 3.60     Types: Cigarettes     Last attempt to quit: 2018     Years since quittin.2    Smokeless tobacco: Never Used   Substance Use Topics    Alcohol use: No    Drug use: No     Blood pressure 120/78, pulse 70, temperature 97.9 °F (36.6 °C), temperature source Oral, resp.  rate 16, height 5' 4\" (1.626 m), weight 265 lb (120.2 kg), not currently breastfeeding. Review of Systems   Constitutional: Negative. Respiratory: Negative for shortness of breath. Cardiovascular: Negative for chest pain, palpitations and leg swelling. Neurological: Positive for headaches. Negative for dizziness, sensory change and focal weakness. Psychiatric/Behavioral: Negative. All other systems reviewed and are negative. Physical Exam   Constitutional: No distress. Overweight. Neck: Neck supple. Cardiovascular: Normal rate, regular rhythm and normal heart sounds. Pulmonary/Chest: Effort normal and breath sounds normal.   Lymphadenopathy:     She has no cervical adenopathy. Skin: Skin is warm and dry. Psychiatric: She has a normal mood and affect. ASSESSMENT and PLAN  Diagnoses and all orders for this visit:    1. Attention deficit hyperactivity disorder (ADHD), unspecified ADHD type  -     dextroamphetamine-amphetamine (ADDERALL) 20 mg tablet; Take 1 Tab by mouth two (2) times a day. Max Daily Amount: 40 mg. Stable on adderall. Medication risks, benefits and potential side effects were reviewed. Given rx for 3 months. 2. Migraine without aura and without status migrainosus, not intractable  -     butalbital-acetaminophen-caffeine (FIORICET, ESGIC) -40 mg per tablet; Take 1 Tab by mouth every six (6) hours as needed for Pain. Stable on prn fioricet. Follow up 3 months or sooner prn.

## 2019-08-21 DIAGNOSIS — G43.009 MIGRAINE WITHOUT AURA AND WITHOUT STATUS MIGRAINOSUS, NOT INTRACTABLE: ICD-10-CM

## 2019-08-22 RX ORDER — BUTALBITAL, ACETAMINOPHEN AND CAFFEINE 50; 325; 40 MG/1; MG/1; MG/1
TABLET ORAL
Qty: 30 TAB | Refills: 1 | Status: SHIPPED | OUTPATIENT
Start: 2019-08-22 | End: 2020-01-17

## 2019-10-31 DIAGNOSIS — F90.9 ATTENTION DEFICIT HYPERACTIVITY DISORDER (ADHD), UNSPECIFIED ADHD TYPE: ICD-10-CM

## 2019-10-31 NOTE — TELEPHONE ENCOUNTER
----- Message from Mark Wong sent at 10/31/2019  4:20 PM EDT -----  Regarding: NP [place]/refill  General Message/Vendor Calls    Caller's first and last name:      Reason for call:      Callback required yes/no and why:      Best contact number(s):  [phone]      Details to clarify the request: Rx for Adderol      Mark Wong    . Requested Prescriptions     Pending Prescriptions Disp Refills    dextroamphetamine-amphetamine (ADDERALL) 20 mg tablet 60 Tab 0     Sig: Take 1 Tab by mouth two (2) times a day. Max Daily Amount: 40 mg.

## 2019-10-31 NOTE — TELEPHONE ENCOUNTER
PCP: Dylan Godinez NP    Last appt: 7/18/2019  Future Appointments   Date Time Provider Chung Manuel   11/7/2019  9:30 AM Burak Sutherland NP [facility]       Requested Prescriptions     Pending Prescriptions Disp Refills    dextroamphetamine-amphetamine (ADDERALL) 20 mg tablet 60 Tab 0     Sig: Take 1 Tab by mouth two (2) times a day. Max Daily Amount: 40 mg.

## 2019-11-04 RX ORDER — DEXTROAMPHETAMINE SACCHARATE, AMPHETAMINE ASPARTATE, DEXTROAMPHETAMINE SULFATE AND AMPHETAMINE SULFATE 5; 5; 5; 5 MG/1; MG/1; MG/1; MG/1
20 TABLET ORAL 2 TIMES DAILY
Qty: 60 TAB | Refills: 0 | Status: SHIPPED | OUTPATIENT
Start: 2019-11-04 | End: 2019-12-02 | Stop reason: SDUPTHER

## 2019-11-05 ENCOUNTER — TELEPHONE (OUTPATIENT)
Dept: FAMILY MEDICINE CLINIC | Age: 29
End: 2019-11-05

## 2019-11-05 NOTE — TELEPHONE ENCOUNTER
----- Message from Kehinde Wisdom sent at 11/5/2019 10:53 AM EST -----  Regarding: NP [place]/Telephone  General Message/Vendor Calls    Caller's first and last name: Thad Sacks      Reason for call: refill      Callback required yes/no and why: yes      Best contact number(s): [phone]      Details to clarify the request: Pt stated that she put in a Rx refill request for Adderall on 10/31/19 and she is being told that she has to wait until her appt on 11/7/19 to get it. Pt stated that, will not work for her because she will be out of medication by then.        Kehinde Wisdom

## 2019-11-07 ENCOUNTER — OFFICE VISIT (OUTPATIENT)
Dept: FAMILY MEDICINE CLINIC | Age: 29
End: 2019-11-07

## 2019-11-07 VITALS
TEMPERATURE: 98 F | RESPIRATION RATE: 18 BRPM | HEIGHT: 64 IN | SYSTOLIC BLOOD PRESSURE: 135 MMHG | DIASTOLIC BLOOD PRESSURE: 88 MMHG | OXYGEN SATURATION: 97 % | BODY MASS INDEX: 45.93 KG/M2 | HEART RATE: 82 BPM | WEIGHT: 269 LBS

## 2019-11-07 DIAGNOSIS — Z23 ENCOUNTER FOR IMMUNIZATION: ICD-10-CM

## 2019-11-07 DIAGNOSIS — F90.9 ATTENTION DEFICIT HYPERACTIVITY DISORDER (ADHD), UNSPECIFIED ADHD TYPE: Primary | ICD-10-CM

## 2019-11-07 DIAGNOSIS — F17.200 TOBACCO USE DISORDER: ICD-10-CM

## 2019-11-07 DIAGNOSIS — E66.01 OBESITY, MORBID (HCC): ICD-10-CM

## 2019-11-07 DIAGNOSIS — G43.009 MIGRAINE WITHOUT AURA AND WITHOUT STATUS MIGRAINOSUS, NOT INTRACTABLE: ICD-10-CM

## 2019-11-07 RX ORDER — TOPIRAMATE 50 MG/1
50 TABLET, FILM COATED ORAL
Qty: 30 TAB | Refills: 1 | Status: SHIPPED | OUTPATIENT
Start: 2019-11-07 | End: 2019-12-02 | Stop reason: SDUPTHER

## 2019-11-07 RX ORDER — IBUPROFEN 200 MG
1 TABLET ORAL EVERY 24 HOURS
Qty: 30 PATCH | Refills: 1 | Status: SHIPPED | OUTPATIENT
Start: 2019-11-07 | End: 2019-12-07

## 2019-11-07 NOTE — LETTER
Carl Jones SRC:[birth year] MR #:[id number] [address] Page 1 of 5 CONTROLLED SUBSTANCE AGREEMENT I may be prescribed medications that are controlled substances as part  of my treatment plan for management of my medical condition(s). The goal of my treatment plan is to maintain and/or improve my health and wellbeing. Because controlled substances have an increased risk of abuse or harm, continual re-evaluation is needed determine if the goals of my treatment plan are being met for my safety and the safety of others. Ros Rios  am entering into this Controlled Substance Agreement with my provider, __________________________________ at [facility] [address] [address] . I understand that successful treatment requires mutual trust and honesty between me and my provider. I understand that there are state and federal laws and regulations which apply to the medications that my provider may prescribe that must be followed. I understand there are risks and benefits ts of taking the medicines that my provider may prescribe. I understand and agree that following this Agreement is necessary in continuing my provider-patient relationship and success of my treatment plan. As a part of my treatment plan, I agree to the following: COMMUNICATION: 
 
1. I will communicate fully with my provider about my medical condition(s), including the effect on my daily life and how well my medications are helping. I will tell my provider all of the medications that I take for any reason, including medications I receive from another health care provider, and will notify my provider about all issues, problems or concerns, including any side effects, which may be related to my medications. I understand that this information allows my provider to adjust my treatment plan to help manage my medical condition.  I understand that this information will become part of my permanent medical record. 2. I will notify my provider if I have a history of alcohol/drug misuse/addiction or if I have had treatment for alcohol/drug addiction in the past, or if I have a new problem with or concern about alcohol/drug use/addiction, because this increases the likelihood of high risk behaviors and may lead to serious medical conditions. 3. Females Only: I will notify my provider if I am or become pregnant, or if I intend to become pregnant, or if I intend to breastfeed. I understand that communication of these issues with my provider is important, due to possible effects my medication could have on an unborn fetus or breastfeeding child. Initials_____ Name:Joanie Ott LME:1/1/[birth year]758 MR #:[id number] [address] 17 Page 2 of 5 MISUSE OF MEDICATIONS / DRUGS: 
 
1. I agree to take all controlled substances as prescribed, and will not misuse or abuse any controlled substances prescribed by my provider. For my safety, I will not increase the amount of medicine I take without first talking with and getting permission from my provider. 2. If I have a medical emergency, another health care provider may prescribe me medication. If I seek emergency treatment, I will notify my provider within seventy-two (72) hours. 3. I understand that my provider may discuss my use and/or possible misuse/abuse of controlled substances and alcohol, as appropriate, with any health care provider involved in my care, pharmacist or legal authority. ILLEGAL DRUGS: 
 
1. I will not use illegal drugs of any kind, including but not limited to marijuana, heroin, cocaine, or any prescription drug which is not prescribed to me. DRUG DIVERSION / PRESCRIPTION FRAUD: 
 
1. I will not share, sell, trade, give away, or otherwise misuse my prescriptions or medications. 2. I will not alter any prescriptions provided to me by my provider. SINGLE PROVIDER: 
 
1. I agree that all controlled substances that I take will be prescribed only by my provider (or his/her covering provider) under this Agreement. This agreement does not prevent me from seeking emergency medical treatment or receiving pain management related to a surgery. PROTECTING MEDICATIONS: 
 
1. I am responsible for keeping my prescriptions and medications in a safe and secure place including safeguarding them from loss or theft. I understand that lost, stolen or damaged/destroyed prescriptions or medications will not be replaced. Initials____ Name:Joanie Bruno NDJ:5[birth year]695 MR #:[id number] [address] 17 Page 3 of 5 PRESCRIPTION RENEWALS/REFILLS: 
 
1. I will follow my controlled substance medication schedule as prescribed by my provider. 2. I understand and agree that I will make any requests for renewals or refills of my prescriptions only at the time of an office visit or during my providers regular office hours subject to the prescription refill requirements of the individual practice. 3. I understand that my provider may not call in prescriptions for controlled substances to my pharmacy. 4. I understand that my provider may adjust or discontinue these medications as deemed appropriate for my medical treatment plan. This Agreement does not guarantee the prescription of controlled medications. 5. I agree that if my medications are adjusted or discontinued, I will properly dispose of any remaining medications. I understand that I will be required to dispose of any remaining controlled medications prior to being provided with any prescriptions for other controlled medications. 6. I understand that the renewal of my prescription depends on my medical condition, my consistent participation, and my adherence with my treatment plan and this Agreement. 7. I understand that if I do not keep an appointment with my provider, I may not receive a renewal or refill for my controlled substance medication. PRESCRIPTION MONITORING / DRUG TESTIN. I understand that my provider may require me to provide urine, saliva or blood for testing at any time. I understand that this testing will be used to monitor for safety and adherence with my treatment plan and this Agreement. 2. I understand that my provider may ask me to provide an observed urine specimen, which means that a nurse or other health care provider may watch me provide urine, and I agree to cooperate if I am asked to provide an observed specimen. 3. I understand that if I do not provide urine, saliva or blood samples within two (2) hours of my providers request, or other timeframe decided by my provider, my treatment plan could be changed, or my prescriptions and medications may be changed or ended. 4. I understand that urine, saliva and blood test results will be a part of my permanent medical record. Initials_____ Name:. Simuel Rinne YZP:358 MR #:[id number] [address] 17 Page 4 of 5 
 
5. I understand that my provider is required to obtain a copy of my State Prescription Monitoring Program () Report at any time in order to safely prescribe medications. 6. I will bring all of my prescribed controlled substance medications in their original bottles to all of my scheduled appointments. 7. I understand that my provider may ask me to come to the practice with all of my prescribed medications for a random pill count at any time. I agree to cooperate if I am asked to come in for a random pill count. I understand that if I do not arrive in the timeframe decided by my provider, my treatment plan could be changed, or my prescriptions and medications may be changed or ended.  
 
COOPERATION WITH INVESTIGATIONS: 
 
 1. I authorize my provider and my pharmacy to cooperate fully with any local, state, or federal law enforcement agency in the investigation of any possible misuse, sale, or other diversion of my controlled substance prescriptions or medications. RISKS: 
 
1. I understand that my level of consciousness may be affected from the use of controlled substances, and I understand that there are risks, benefits, effects and potential alternatives (including no treatment) to the medications that my provider has prescribed. 2. I understand that I may become drowsy, tired, dizzy, constipated, and sick to my stomach, or have changes in my mood or in my sleep while taking my medications. I have talked with my provider about these possible side effects, risks, benefits, and alternative treatments, and my provider has answered all of my questions. 3. I understand that I should not suddenly stop taking my medications without first speaking with my provider. I understand that if I suddenly stop taking my medications, I may experience nausea, vomiting, sweating,anxiety, sleeplessness, itching or other uncomfortable feelings. 4. I will not take my medications with alcohol of any kind, including beer, wine or liquor. I understand that drinking alcohol with my medications increases the chances of side effects, including breathing problems or even death. 5. I understand that if I have a history of alcoholism or other drug addiction I may be at increased risk of addiction to my medications. Signs of addiction might include craving, compulsive use, and continued use despite harm. Since addiction is a disease, I understand my provider may decide to change my medications and refer me to appropriate treatment services. I understand that this information would become part of my permanent medical record. Initials_____ Name:Joanie Cory Reveles LBV:[birth year] MR #:[id number] Lupe   
 Page 5 of 5  
 
 
6. Females only: Children born to women who regularly take controlled substances are likely to have physical problems and suffer withdrawal symptoms at birth. If I am of child-bearing age, I understand that I should use safe and effective birth control while taking any controlled substances to avoid the impact of medications on an unborn fetus or  child. I agree to notify my provider immediately if I should become pregnant so that my treatment plan can be adjusted. 7. Males only: I understand that chronic use of controlled substances has been associated with low testosterone levels in males which may affect my mood, stamina, sexual desire, and general health. I understand that my provider may order the appropriate laboratory test to determine my testosterone level,and I agree to this testing. ADHERENCE: 
 
1. I understand that if I do not adhere to this Agreement in any way, my provider may change my prescriptions, stop prescribing controlled substances or end our provider-patient relationship. 2. If my provider decides to stop prescribing medication, or decides to end our provider-patient relationship,my provider may require that I taper my medications slowly. If necessary, my provider may also provide a prescription for other medications to treat my withdrawal symptoms. UNDERSTANDING THIS AGREEMENT: 
 
I understand that my provider may adjust or stop my prescriptions for controlled substances based on my medical condition and my treatment plan. I understand that this Agreement does not guarantee that I will be prescribed medications or controlled substances. I understand that controlled substances may be just one part 
of my treatment plan. My initial on each page and my signature below shows that I have read each page of this Agreement, I have had an opportunity to ask questions, and all of my questions have been answered to my satisfaction by my provider. By signing below, I agree to comply with this Agreement, and I understand that if I do not follow the Agreements listed above, my provider may stop 
 
_________________________________________  Date/Time 11/7/2019 9:55 AM   
             (Patient Signature) 
 
________________________________________    Date/Time 11/7/2019 9:55 AM 
 (Parent or Guardian Signature if <18 yrs) 
 
_________________________________________ Date/Time 11/7/2019 9:55 AM 
 (Provider Signature)

## 2019-11-07 NOTE — PATIENT INSTRUCTIONS
Vaccine Information Statement    Influenza (Flu) Vaccine (Inactivated or Recombinant): What You Need to Know    Many Vaccine Information Statements are available in Croatian and other languages. See www.immunize.org/vis  Hojas de información sobre vacunas están disponibles en español y en muchos otros idiomas. Visite www.immunize.org/vis    1. Why get vaccinated? Influenza vaccine can prevent influenza (flu). Flu is a contagious disease that spreads around the [facility] every year, usually between October and May. Anyone can get the flu, but it is more dangerous for some people. Infants and young children, people 72years of age and older, pregnant women, and people with certain health conditions or a weakened immune system are at greatest risk of flu complications. Pneumonia, bronchitis, sinus infections and ear infections are examples of flu-related complications. If you have a medical condition, such as heart disease, cancer or diabetes, flu can make it worse. Flu can cause fever and chills, sore throat, muscle aches, fatigue, cough, headache, and runny or stuffy nose. Some people may have vomiting and diarrhea, though this is more common in children than adults. Each year thousands of people in the [facility] die from flu, and many more are hospitalized. Flu vaccine prevents millions of illnesses and flu-related visits to the doctor each year. 2. Influenza vaccines     CDC recommends everyone 10months of age and older get vaccinated every flu season. Children 6 months through 6years of age may need 2 doses during a single flu season. Everyone else needs only 1 dose each flu season. It takes about 2 weeks for protection to develop after vaccination. There are many flu viruses, and they are always changing. Each year a new flu vaccine is made to protect against three or four viruses that are likely to cause disease in the upcoming flu season.  Even when the vaccine doesnt exactly match these viruses, it may still provide some protection. Influenza vaccine does not cause flu. Influenza vaccine may be given at the same time as other vaccines. 3. Talk with your health care provider    Tell your vaccine provider if the person getting the vaccine:   Has had an allergic reaction after a previous dose of influenza vaccine, or has any severe, life-threatening allergies.  Has ever had Guillain-Barré Syndrome (also called GBS). In some cases, your health care provider may decide to postpone influenza vaccination to a future visit. People with minor illnesses, such as a cold, may be vaccinated. People who are moderately or severely ill should usually wait until they recover before getting influenza vaccine. Your health care provider can give you more information. 4. Risks of a reaction     Soreness, redness, and swelling where shot is given, fever, muscle aches, and headache can happen after influenza vaccine.  There may be a very small increased risk of Guillain-Barré Syndrome (GBS) after inactivated influenza vaccine (the flu shot). Jodee Tolbert children who get the flu shot along with pneumococcal vaccine (PCV13), and/or DTaP vaccine at the same time might be slightly more likely to have a seizure caused by fever. Tell your health care provider if a child who is getting flu vaccine has ever had a seizure. People sometimes faint after medical procedures, including vaccination. Tell your provider if you feel dizzy or have vision changes or ringing in the ears. As with any medicine, there is a very remote chance of a vaccine causing a severe allergic reaction, other serious injury, or death. 5. What if there is a serious problem? An allergic reaction could occur after the vaccinated person leaves the clinic.  If you see signs of a severe allergic reaction (hives, swelling of the face and throat, difficulty breathing, a fast heartbeat, dizziness, or weakness), call 9-1-1 and get the person to the nearest hospital.    For other signs that concern you, call your health care provider. Adverse reactions should be reported to the Vaccine Adverse Event Reporting System (VAERS). Your health care provider will usually file this report, or you can do it yourself. Visit the VAERS website at www.vaers. [facility].gov or call 3-824.635.1870. VAERS is only for reporting reactions, and VAERS staff do not give medical advice. 6. The National Vaccine Injury Compensation Program    The [facility] Vaccine Injury Compensation Program (VICP) is a federal program that was created to compensate people who may have been injured by certain vaccines. Visit the VICP website at www.[facility].gov/vaccinecompensation or call 2-966.824.5717 to learn about the program and about filing a claim. There is a time limit to file a claim for compensation. 7. How can I learn more?  Ask your health care provider.  Call your local or state health department.  Contact the Centers for Disease Control and Prevention (CDC):  - Call 3-756.532.8319 (1-800-CDC-INFO) or  - Visit CDCs influenza website at www.cdc.gov/flu    Vaccine Information Statement (Interim)  Inactivated Influenza Vaccine   8/15/2019  42 PATRICK Trujilol [id number]-[id number]   Department of Health and Human Services  Centers for Disease Control and Prevention    Office Use Only

## 2019-11-07 NOTE — PROGRESS NOTES
HISTORY OF PRESENT ILLNESS  Usman Porras is a 34 y.o. female. HPI  Follow up health problems. ADHD. 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 Has another job at [employer], starting in a few weeks. Taking prescribed adderall daily. Medication helps with focus, organization and staying on task. Migraine headaches. Occurrence is variable. Usually about 2-3 x month lasting 1-3 days. Using fioricet with adequate sx relief. She has been on topamax in the past and would like to resume med. Thinks this may help with weight control. Smoker. Has increased smoking to 1ppd. Requesting rx for nicotine patches to help with cessation. Obesity. Admits to not consistently following a healthy diet and does not exercise on a regular basis. Past Medical History:   Diagnosis Date         Acne 3/3/2005    ADD (attention deficit disorder) since 11yo    Allergic rhinitis     JAQUAN (generalized anxiety disorder)     Genital herpes     Positive per lab work only, NO hx of an outbreak/active lesion    Granuloma faciale     Excised    Hyperhydrosis disorder     Axillary    Migraines    Amparo Gonsalves S/P tonsillectomy and adenoidectomy 2003     Patient Active Problem List   Diagnosis Code    JAQUAN (generalized anxiety disorder) F41.1    Attention deficit hyperactivity disorder (ADHD) F90.9    Migraine without aura and without status migrainosus, not intractable G43.009    Obesity, morbid ([address] E66.01     Current Outpatient Medications   Medication Sig    nicotine (NICODERM CQ) 21 mg/24 hr 1 Patch by TransDERmal route every twenty-four (24) hours for 30 days.  topiramate (TOPAMAX) 50 mg tablet Take 1 Tab by mouth nightly.  dextroamphetamine-amphetamine (ADDERALL) 20 mg tablet Take 1 Tab by mouth two (2) times a day.  Max Daily Amount: 40 mg.    butalbital-acetaminophen-caffeine (FIORICET, ESGIC) -40 mg per tablet TAKE 1 TABLET BY MOUTH EVERY 4 HOURS AS NEEDED FOR PAIN    levonorgestrel (MIRENA) 20 mcg/24 hours (5 yrs) 52 mg IUD 1 Device by IntraUTERine route once.  ibuprofen (MOTRIN) 800 mg tablet Take 1 Tab by mouth every eight (8) hours.  cyanocobalamin (VITAMIN B-12) 100 mcg tablet Take 100 mcg by mouth daily.  ferrous sulfate (IRON) 325 mg (65 mg iron) tablet Take  by mouth Daily (before breakfast). No current facility-administered medications for this visit. Social History     Tobacco Use    Smoking status: Current Every Day Smoker     Packs/day: 1.00     Years: 12.00     Pack years: 12.00     Types: Cigarettes    Smokeless tobacco: Never Used    Tobacco comment: patient quit 4/17/18 but restarted 7/2019   Substance Use Topics    Alcohol use: No    Drug use: No     Blood pressure 135/88, pulse 82, temperature 98 °F (36.7 °C), temperature source Oral, resp. rate 18, height 5' 4\" (1.626 m), weight 269 lb (122 kg), last menstrual period 11/05/2019, SpO2 97 %, not currently breastfeeding. Review of Systems   Constitutional: Negative. Respiratory: Negative for cough and shortness of breath. Cardiovascular: Negative for chest pain and palpitations. Neurological: Positive for headaches. Negative for focal weakness. Psychiatric/Behavioral: Negative for depression. The patient is not nervous/anxious and does not have insomnia. All other systems reviewed and are negative. Physical Exam   Constitutional:   Overweight. Neck: Neck supple. Cardiovascular: Normal rate, regular rhythm and normal heart sounds. Pulmonary/Chest: Effort normal and breath sounds normal.   Abdominal: Soft. She exhibits no distension. There is no tenderness. There is no guarding. Musculoskeletal: Normal range of motion. She exhibits no edema. Lymphadenopathy:     She has no cervical adenopathy. Neurological: She is alert. Coordination normal.   Skin: Skin is warm and dry.    Psychiatric: She has a normal mood and affect. Her behavior is normal.       ASSESSMENT and PLAN  Diagnoses and all orders for this visit:    1. Attention deficit hyperactivity disorder (ADHD), unspecified ADHD type  Stable on adderall. Medication risks, benefits and potential side effects were reviewed. Patient informed of new office policy for controlled substance management including  review and periodic urine drug screen. She is in agreement with policy, contract signed and scanned. 2. Encounter for immunization  -     INFLUENZA VIRUS VAC QUAD,SPLIT,PRESV FREE SYRINGE IM  -     UT IMMUNIZ ADMIN,1 SINGLE/COMB VAC/TOXOID    3. Tobacco use disorder  -     nicotine (NICODERM CQ) 21 mg/24 hr; 1 Patch by TransDERmal route every twenty-four (24) hours for 30 days. 4. Migraine without aura and without status migrainosus, not intractable  -     topiramate (TOPAMAX) 50 mg tablet; Take 1 Tab by mouth nightly. Moderately stable on fioricet. Will resume topamax. Medication risks, benefits and potential side effects were reviewed. 5. Obesity, morbid ([address]  Reviewed healthy diet and exercise recommendations. Follow up 6 months or sooner prn.

## 2019-11-07 NOTE — PROGRESS NOTES
Chief Complaint   Patient presents with    Behavioral Problem     Adderall 20mg    Nicotine Dependence     requesting patches to help with smoking cessation     . 1. Have you been to the ER, urgent care clinic since your last visit? Hospitalized since your last visit? No    2. Have you seen or consulted any other health care providers outside of the [address] [place] [address] since your last visit? Include any pap smears or colon screening.  No  '

## 2019-12-02 DIAGNOSIS — F90.9 ATTENTION DEFICIT HYPERACTIVITY DISORDER (ADHD), UNSPECIFIED ADHD TYPE: ICD-10-CM

## 2019-12-02 RX ORDER — DEXTROAMPHETAMINE SACCHARATE, AMPHETAMINE ASPARTATE, DEXTROAMPHETAMINE SULFATE AND AMPHETAMINE SULFATE 5; 5; 5; 5 MG/1; MG/1; MG/1; MG/1
20 TABLET ORAL 2 TIMES DAILY
Qty: 60 TAB | Refills: 0 | Status: SHIPPED | OUTPATIENT
Start: 2019-12-02 | End: 2020-01-03 | Stop reason: SDUPTHER

## 2019-12-02 NOTE — TELEPHONE ENCOUNTER
----- Message from [facility] sent at 12/2/2019 11:26 AM EST -----  Regarding: Np Bowers/ Refill  Contact: [phone]  Medication Refill      Best contact number(s): [phone]      Name of medication and dosage if known: adderall- 20mg      Is patient out of this medication (yes/no):Yes      Pharmacy name: [facility] listed in chart? (yes/no): Yes  Pharmacy phone number: [phone]      [facility]       . Requested Prescriptions     Pending Prescriptions Disp Refills    dextroamphetamine-amphetamine (ADDERALL) 20 mg tablet 60 Tab 0     Sig: Take 1 Tab by mouth two (2) times a day. Max Daily Amount: 40 mg.

## 2019-12-02 NOTE — TELEPHONE ENCOUNTER
Last refill 11/04/19 per   PCP: Calvin Taylor NP    Last appt: 11/7/2019  No future appointments. Requested Prescriptions     Pending Prescriptions Disp Refills    dextroamphetamine-amphetamine (ADDERALL) 20 mg tablet 60 Tab 0     Sig: Take 1 Tab by mouth two (2) times a day. Max Daily Amount: 40 mg.

## 2020-01-03 DIAGNOSIS — F90.9 ATTENTION DEFICIT HYPERACTIVITY DISORDER (ADHD), UNSPECIFIED ADHD TYPE: ICD-10-CM

## 2020-01-03 RX ORDER — DEXTROAMPHETAMINE SACCHARATE, AMPHETAMINE ASPARTATE, DEXTROAMPHETAMINE SULFATE AND AMPHETAMINE SULFATE 5; 5; 5; 5 MG/1; MG/1; MG/1; MG/1
20 TABLET ORAL 2 TIMES DAILY
Qty: 60 TAB | Refills: 0 | Status: SHIPPED | OUTPATIENT
Start: 2020-01-03 | End: 2020-02-01 | Stop reason: SDUPTHER

## 2020-01-03 NOTE — TELEPHONE ENCOUNTER
Last refill 12/03/19 per   PCP: Vince Nguyen NP    Last appt: 11/7/2019  No future appointments. Requested Prescriptions     Pending Prescriptions Disp Refills    dextroamphetamine-amphetamine (ADDERALL) 20 mg tablet 60 Tab 0     Sig: Take 1 Tab by mouth two (2) times a day. Max Daily Amount: 40 mg.

## 2020-01-03 NOTE — TELEPHONE ENCOUNTER
----- Message from Nikky Leonard sent at 1/2/2020  5:39 PM EST -----  Regarding: NP [place]/rx refill  Medication Refill    Caller (if not patient):      Relationship of caller (if not patient):      Best contact number(s):[phone]      Name of medication and dosage if known: Adderall 20mg 2x a day       Is patient out of this medication (yes/no):has a few left      [facility] [address]    Pharmacy listed in chart? (yes/no):yes  Pharmacy phone number:list in her chart, per pt      Details to clarify the request:      Nikky Leonard        . Requested Prescriptions     Pending Prescriptions Disp Refills    dextroamphetamine-amphetamine (ADDERALL) 20 mg tablet 60 Tab 0     Sig: Take 1 Tab by mouth two (2) times a day. Max Daily Amount: 40 mg.

## 2020-01-17 DIAGNOSIS — G43.009 MIGRAINE WITHOUT AURA AND WITHOUT STATUS MIGRAINOSUS, NOT INTRACTABLE: ICD-10-CM

## 2020-01-17 RX ORDER — BUTALBITAL, ACETAMINOPHEN AND CAFFEINE 50; 325; 40 MG/1; MG/1; MG/1
TABLET ORAL
Qty: 30 TAB | Refills: 1 | Status: SHIPPED | OUTPATIENT
Start: 2020-01-17 | End: 2020-02-26 | Stop reason: SDUPTHER

## 2020-02-01 DIAGNOSIS — F90.9 ATTENTION DEFICIT HYPERACTIVITY DISORDER (ADHD), UNSPECIFIED ADHD TYPE: ICD-10-CM

## 2020-02-01 NOTE — TELEPHONE ENCOUNTER
----- Message from Mariana Perdue sent at 2/1/2020 12:31 PM EST -----  Regarding: NP [place]/telephone  Medication Refill    Caller (if not patient):      Relationship of caller (if not patient):      Best contact number(s):[phone]      Name of medication and dosage if known: Adderal      Is patient out of this medication (yes/no): yes      Pharmacy name: [facility]    Pharmacy listed in chart? (yes/no):  Pharmacy phone number:      Details to clarify the request:        .  Requested Prescriptions     Pending Prescriptions Disp Refills    dextroamphetamine-amphetamine (ADDERALL) 20 mg tablet 60 Tab 0     Sig: Take 1 Tab by mouth two (2) times a day. Max Daily Amount: 40 mg.      Last refill : 1/3/2020  LOV :  Thursday, November 07, 2019   Pharmacy verified

## 2020-02-03 RX ORDER — DEXTROAMPHETAMINE SACCHARATE, AMPHETAMINE ASPARTATE, DEXTROAMPHETAMINE SULFATE AND AMPHETAMINE SULFATE 5; 5; 5; 5 MG/1; MG/1; MG/1; MG/1
20 TABLET ORAL 2 TIMES DAILY
Qty: 60 TAB | Refills: 0 | Status: SHIPPED | OUTPATIENT
Start: 2020-02-03 | End: 2020-03-05 | Stop reason: SDUPTHER

## 2020-02-03 NOTE — TELEPHONE ENCOUNTER
Last refill 01/03/20 per   PCP: Kiara Penny NP    Last appt: 11/7/2019  No future appointments. Requested Prescriptions     Pending Prescriptions Disp Refills    dextroamphetamine-amphetamine (ADDERALL) 20 mg tablet 60 Tab 0     Sig: Take 1 Tab by mouth two (2) times a day. Max Daily Amount: 40 mg.

## 2020-02-26 DIAGNOSIS — G43.009 MIGRAINE WITHOUT AURA AND WITHOUT STATUS MIGRAINOSUS, NOT INTRACTABLE: ICD-10-CM

## 2020-02-26 NOTE — TELEPHONE ENCOUNTER
PCP: Norma Richardson NP  Last refill 01/17/20  Last appt: 11/7/2019  No future appointments.     Requested Prescriptions     Pending Prescriptions Disp Refills    butalbital-acetaminophen-caffeine (FIORICET, ESGIC) -40 mg per tablet 30 Tab 1

## 2020-02-26 NOTE — TELEPHONE ENCOUNTER
----- Message from Yulisajamal Dominique sent at 2/26/2020  9:46 AM EST -----  Regarding: Np. [place]/ refill  Medication Refill    Caller (if not patient):      Relationship of caller (if not patient):      Best contact number(s):[phone]      Name of medication and dosage if known: butalbital-acetaminophen-caffeine (FIORICET, ESGIC) -40 mg     Is patient out of this medication (yes/no):yes   Pharmacy name:[facility]    Pharmacy listed in chart? (yes/no):yes   Pharmacy phone number:  Details to clarify the request:    Kurttal Dominique    Requested Prescriptions     Pending Prescriptions Disp Refills    butalbital-acetaminophen-caffeine (FIORICET, ESGIC) -40 mg per tablet 30 Tab 1

## 2020-02-27 RX ORDER — BUTALBITAL, ACETAMINOPHEN AND CAFFEINE 50; 325; 40 MG/1; MG/1; MG/1
1 TABLET ORAL
Qty: 30 TAB | Refills: 1 | Status: SHIPPED | OUTPATIENT
Start: 2020-02-27 | End: 2020-04-03 | Stop reason: SDUPTHER

## 2020-03-05 DIAGNOSIS — F90.9 ATTENTION DEFICIT HYPERACTIVITY DISORDER (ADHD), UNSPECIFIED ADHD TYPE: ICD-10-CM

## 2020-03-05 NOTE — TELEPHONE ENCOUNTER
Leo Masterson PT CALLED REQUESTING REFILL     Requested Prescriptions     Pending Prescriptions Disp Refills    dextroamphetamine-amphetamine (ADDERALL) 20 mg tablet 60 Tab 0     Sig: Take 1 Tab by mouth two (2) times a day. Max Daily Amount: 40 mg.      BEST CALL [facility]#[phone]  LAST REFILL 2/3/2020

## 2020-03-05 NOTE — TELEPHONE ENCOUNTER
Last refill 02/05/2020 per   PCP: Lara Goodell, NP    Last appt: 11/7/2019  No future appointments. Requested Prescriptions     Pending Prescriptions Disp Refills    dextroamphetamine-amphetamine (ADDERALL) 20 mg tablet 60 Tab 0     Sig: Take 1 Tab by mouth two (2) times a day. Max Daily Amount: 40 mg.

## 2020-03-06 RX ORDER — DEXTROAMPHETAMINE SACCHARATE, AMPHETAMINE ASPARTATE, DEXTROAMPHETAMINE SULFATE AND AMPHETAMINE SULFATE 5; 5; 5; 5 MG/1; MG/1; MG/1; MG/1
20 TABLET ORAL 2 TIMES DAILY
Qty: 60 TAB | Refills: 0 | Status: SHIPPED | OUTPATIENT
Start: 2020-03-06 | End: 2020-04-03 | Stop reason: SDUPTHER

## 2020-03-13 ENCOUNTER — OFFICE VISIT (OUTPATIENT)
Dept: FAMILY MEDICINE CLINIC | Age: 30
End: 2020-03-13

## 2020-03-13 VITALS
WEIGHT: 246.8 LBS | SYSTOLIC BLOOD PRESSURE: 129 MMHG | HEART RATE: 100 BPM | DIASTOLIC BLOOD PRESSURE: 85 MMHG | BODY MASS INDEX: 42.14 KG/M2 | OXYGEN SATURATION: 97 % | TEMPERATURE: 98.2 F | HEIGHT: 64 IN | RESPIRATION RATE: 16 BRPM

## 2020-03-13 DIAGNOSIS — R11.10 NON-INTRACTABLE VOMITING, PRESENCE OF NAUSEA NOT SPECIFIED, UNSPECIFIED VOMITING TYPE: Primary | ICD-10-CM

## 2020-03-13 DIAGNOSIS — K52.9 GASTROENTERITIS: ICD-10-CM

## 2020-03-13 RX ORDER — ONDANSETRON 4 MG/1
8 TABLET, ORALLY DISINTEGRATING ORAL
Qty: 30 TAB | Refills: 0 | Status: SHIPPED | OUTPATIENT
Start: 2020-03-13 | End: 2020-06-11 | Stop reason: ALTCHOICE

## 2020-03-13 NOTE — LETTER
NOTIFICATION RETURN TO WORK / SCHOOL 
 
3/13/2020 4:11 PM 
 
Ms. Radha Rojas [address] [id number] To Whom It May Concern: 
 
Radha Rojas is currently under the care of [facility]. She will return to work/school on: 3/16/2020 If there are questions or concerns please have the patient contact our office. Sincerely, Allie Anaya MD

## 2020-03-13 NOTE — PATIENT INSTRUCTIONS
Nausea and Vomiting: Care Instructions  Your Care Instructions    When you are nauseated, you may feel weak and sweaty and notice a lot of saliva in your mouth. Nausea often leads to vomiting. Most of the time you do not need to worry about nausea and vomiting, but they can be signs of other illnesses. Two common causes of nausea and vomiting are stomach flu and food poisoning. Nausea and vomiting from viral stomach flu will usually start to improve within 24 hours. Nausea and vomiting from food poisoning may last from 12 to 48 hours. The doctor has checked you carefully, but problems can develop later. If you notice any problems or new symptoms, get medical treatment right away. Follow-up care is a key part of your treatment and safety. Be sure to make and go to all appointments, and call your doctor if you are having problems. It's also a good idea to know your test results and keep a list of the medicines you take. How can you care for yourself at home? · To prevent dehydration, drink plenty of fluids, enough so that your urine is light yellow or clear like water. Choose water and other caffeine-free clear liquids until you feel better. If you have kidney, heart, or liver disease and have to limit fluids, talk with your doctor before you increase the amount of fluids you drink. · Rest in bed until you feel better. · When you are able to eat, try clear soups, mild foods, and liquids until all symptoms are gone for 12 to 48 hours. Other good choices include dry toast, crackers, cooked cereal, and gelatin dessert, such as Jell-O. When should you call for help? Call 911 anytime you think you may need emergency care. For example, call if:    · You passed out (lost consciousness).    Call your doctor now or seek immediate medical care if:    · You have symptoms of dehydration, such as:  ? Dry eyes and a dry mouth. ? Passing only a little dark urine. ?  Feeling thirstier than usual.     · You have new or worsening belly pain.     · You have a new or higher fever.     · You vomit blood or what looks like coffee grounds.    Watch closely for changes in your health, and be sure to contact your doctor if:    · You have ongoing nausea and vomiting.     · Your vomiting is getting worse.     · Your vomiting lasts longer than 2 days.     · You are not getting better as expected. Where can you learn more? Go to http://wanda-alden.info/  Enter H591 in the search box to learn more about \"Nausea and Vomiting: Care Instructions. \"  Current as of: June 26, 2019Content Version: 12.4  © 8523-6503 [employer], [employer]. Care instructions adapted under license by [employer] (which disclaims liability or warranty for this information). If you have questions about a medical condition or this instruction, always ask your healthcare professional. [address] any warranty or liability for your use of this information.

## 2020-03-13 NOTE — PROGRESS NOTES
[place] [facility] Note      Subjective:     Chief Complaint   Patient presents with    Diarrhea     x one day     Vomiting    Other     hot flashes      Gerardo Skinner is a 34y.o. year old female who presents for evaluation of the following:      Vomiting:  Onset: 1 day  Current Symptoms: nausea, vomiting, diarrhea, feels hot  - improved today  Previous Symptoms Now Resolved: None  Treatment: None  Endorses back aches, multiple sick contacts at home  Denies fever, chills, hemoptysis, chest pain, shortness of breath, rash        Review of Systems   Pertinent positives and negative per HPI. All other systems  reviewed are negative for a Comprehensive ROS (10+).        Past Medical History:   Diagnosis Date         Acne 3/3/2005    ADD (attention deficit disorder) since 13yo    Allergic rhinitis     JAQUAN (generalized anxiety disorder)     Genital herpes     Positive per lab work only, NO hx of an outbreak/active lesion    Granuloma faciale     Excised    Hyperhydrosis disorder     Axillary    Migraines    [facility] Dr Rufus Leon    S/P tonsillectomy and adenoidectomy         Social History     Socioeconomic History    Marital status: SINGLE     Spouse name: Not on file    Number of children: Not on file    Years of education: Not on file    Highest education level: Not on file   Occupational History    Occupation: Classes at [address] [place] [address], W, F for Undergrad degree    Occupation: Sales full time   Social Needs    Financial resource strain: Not on file    Food insecurity     Worry: Not on file     Inability: Not on file   [employer] needs     Medical: Not on file     Non-medical: Not on file   Tobacco Use    Smoking status: Current Every Day Smoker     Packs/day: 1.00     Years: 12.00     Pack years: 12.00     Types: Cigarettes    Smokeless tobacco: Never Used    Tobacco comment: patient quit 18 but restarted 2019 Substance and Sexual Activity    Alcohol use: No    Drug use: No    Sexual activity: Yes     Partners: Male     Birth control/protection: None   Lifestyle    Physical activity     Days per week: Not on file     Minutes per session: Not on file    Stress: Not on file   Relationships    Social connections     Talks on phone: Not on file     Gets together: Not on file     Attends Baptism service: Not on file     Active member of club or organization: Not on file     Attends meetings of clubs or organizations: Not on file     Relationship status: Not on file    Intimate partner violence     Fear of current or ex partner: Not on file     Emotionally abused: Not on file     Physically abused: Not on file     Forced sexual activity: Not on file   Other Topics Concern     Service Not Asked    Blood Transfusions Not Asked    Caffeine Concern Not Asked    Occupational Exposure Not Asked   [address] Not Asked    Sleep Concern Not Asked    Stress Concern Not Asked    Weight Concern Not Asked    Special Diet Not Asked    Back Care Not Asked    Exercise Not Asked    Bike Helmet Not Asked   [address] Not Asked    Self-Exams Not Asked   Social History Narrative    Not on file       Family History   Problem Relation Age of Onset    Other Maternal Grandmother         bone spurs    Hypertension Mother     Heart Disease Father     Diabetes Paternal Grandmother     Cancer Paternal Grandfather         prostate       Current Outpatient Medications   Medication Sig    dextroamphetamine-amphetamine (ADDERALL) 20 mg tablet Take 1 Tab by mouth two (2) times a day. Max Daily Amount: 40 mg. Appointment due.  butalbital-acetaminophen-caffeine (FIORICET, ESGIC) -40 mg per tablet Take 1 Tab by mouth every four (4) hours as needed for Headache.     topiramate (TOPAMAX) 50 mg tablet TAKE 1 TABLET BY MOUTH EVERY DAY AT NIGHT    levonorgestrel (MIRENA) 20 mcg/24 hours (5 yrs) 52 mg IUD 1 Device by IntraUTERine route once.  ibuprofen (MOTRIN) 800 mg tablet Take 1 Tab by mouth every eight (8) hours.  cyanocobalamin (VITAMIN B-12) 100 mcg tablet Take 100 mcg by mouth daily.  ferrous sulfate (IRON) 325 mg (65 mg iron) tablet Take  by mouth Daily (before breakfast). No current facility-administered medications for this visit. Objective:     Vitals:    03/13/20 1554   BP: 129/85   Pulse: 100   Resp: 16   Temp: 98.2 °F (36.8 °C)   TempSrc: Oral   SpO2: 97%   Weight: 246 lb 12.8 oz (111.9 kg)   Height: 5' 4\" (1.626 m)       Physical Examination:  General: Alert, cooperative, no distress, appears stated age. Obese  Eyes: Conjunctivae clear. Pupils equally round  Ears: Normal external ear canals both ears. Nose: Nares normal.   Mouth/Throat: Lips, mucosa, and tongue normal. No oropharyngeal erythema. No tonsillar enlargement or exudate. Neck: Supple, symmetrical, trachea midline, no adenopathy. No thyroid enlargement/tenderness/nodules  Respiratory: Breathing comfortably, in no acute respiratory distress. Clear to auscultation bilaterally. Normal inspiratory and expiratory ratio. Cardiovascular: Regular rate and rhythm, S1, S2 normal, no murmur, click, rub or gallop.   -Extremities no edema. Pulses 2+ and symmetric radial  Abdomen: Soft, non-tender, not distended. Bowel sounds normal. No masses or organomegaly. MSK: Extremities normal appearing, atraumatic, no effusion. Gait steady and unassisted. Back symmetric, no curvature. Skin: Skin color, texture, turgor normal. No rashes or lesions on exposed skin. Lymph nodes: Cervical, supraclavicular nodes normal.  Neurologic: Cranial nerves II-XII intact. Strength 5/5 grossly. Sensation and reflexes normal throughout. Psychiatric: Affect appropriate. No visits with results within 3 Month(s) from this visit.    Latest known visit with results is:   Office Visit on 12/29/2018   Component Date Value Ref Range Status    VALID INTERNAL CONTROL POC 12/29/2018 Yes   Final    QuickVue Influenza test 12/29/2018 Negative  Negative Final           Assessment/ Plan:   Diagnoses and all orders for this visit:    1. Non-intractable vomiting, presence of nausea not specified, unspecified vomiting type  -     ondansetron (ZOFRAN ODT) 4 mg disintegrating tablet; Take 2 Tabs by mouth every eight (8) hours as needed for Nausea, Vomiting or Nausea or Vomiting. 2. Gastroenteritis      Infectious gastroenteric, improving. No SIRS. Zofran for nausea and vomiting. Educated patient on red flag symptoms to warrant return to clinic or emergency room visit. I have discussed the diagnosis with the patient and the intended plan as seen in the above orders. The patient has been offered or received an after-visit summary and questions were answered concerning future plans. I have discussed medication side effects and warnings with the patient as well. Follow-up and Dispositions    · Return if symptoms worsen or fail to improve.          Signed,    Andree Bellamy MD  3/13/2020

## 2020-03-13 NOTE — PROGRESS NOTES
Chief Complaint   Patient presents with    Diarrhea     x one day     Vomiting    Other     hot flashes      1. Have you been to the ER, urgent care clinic since your last visit? Hospitalized since your last visit? No    2. Have you seen or consulted any other health care providers outside of the [facility] since your last visit? Include any pap smears or colon screening. No           Patient had onset yesterday and N/V/D. Patient states that her mother, bf and child have been sick.

## 2020-04-03 DIAGNOSIS — F90.9 ATTENTION DEFICIT HYPERACTIVITY DISORDER (ADHD), UNSPECIFIED ADHD TYPE: ICD-10-CM

## 2020-04-03 DIAGNOSIS — G43.009 MIGRAINE WITHOUT AURA AND WITHOUT STATUS MIGRAINOSUS, NOT INTRACTABLE: ICD-10-CM

## 2020-04-03 RX ORDER — DEXTROAMPHETAMINE SACCHARATE, AMPHETAMINE ASPARTATE, DEXTROAMPHETAMINE SULFATE AND AMPHETAMINE SULFATE 5; 5; 5; 5 MG/1; MG/1; MG/1; MG/1
20 TABLET ORAL 2 TIMES DAILY
Qty: 60 TAB | Refills: 0 | Status: SHIPPED | OUTPATIENT
Start: 2020-04-03 | End: 2020-05-04 | Stop reason: SDUPTHER

## 2020-04-03 RX ORDER — BUTALBITAL, ACETAMINOPHEN AND CAFFEINE 50; 325; 40 MG/1; MG/1; MG/1
1 TABLET ORAL
Qty: 30 TAB | Refills: 1 | Status: SHIPPED | OUTPATIENT
Start: 2020-04-03 | End: 2020-09-03 | Stop reason: SDUPTHER

## 2020-04-03 NOTE — TELEPHONE ENCOUNTER
Patient is calling requesting a refill on the following meds. pharm on file verified. Requested Prescriptions     Pending Prescriptions Disp Refills    dextroamphetamine-amphetamine (ADDERALL) 20 mg tablet 60 Tab 0     Sig: Take 1 Tab by mouth two (2) times a day. Max Daily Amount: 40 mg. Appointment due.  butalbital-acetaminophen-caffeine (FIORICET, ESGIC) -40 mg per tablet 30 Tab 1     Sig: Take 1 Tab by mouth every four (4) hours as needed for Headache.

## 2020-04-03 NOTE — TELEPHONE ENCOUNTER
Last refill 03/06/20 per   PCP: Cheyenne Jean NP    Last appt: 3/13/2020  No future appointments. Requested Prescriptions     Pending Prescriptions Disp Refills    dextroamphetamine-amphetamine (ADDERALL) 20 mg tablet 60 Tab 0     Sig: Take 1 Tab by mouth two (2) times a day. Max Daily Amount: 40 mg. Appointment due.  butalbital-acetaminophen-caffeine (FIORICET, ESGIC) -40 mg per tablet 30 Tab 1     Sig: Take 1 Tab by mouth every four (4) hours as needed for Headache.

## 2020-05-04 DIAGNOSIS — F90.9 ATTENTION DEFICIT HYPERACTIVITY DISORDER (ADHD), UNSPECIFIED ADHD TYPE: ICD-10-CM

## 2020-05-04 RX ORDER — DEXTROAMPHETAMINE SACCHARATE, AMPHETAMINE ASPARTATE, DEXTROAMPHETAMINE SULFATE AND AMPHETAMINE SULFATE 5; 5; 5; 5 MG/1; MG/1; MG/1; MG/1
20 TABLET ORAL 2 TIMES DAILY
Qty: 60 TAB | Refills: 0 | Status: SHIPPED | OUTPATIENT
Start: 2020-05-04 | End: 2020-06-08 | Stop reason: SDUPTHER

## 2020-05-04 NOTE — TELEPHONE ENCOUNTER
----- Message from Juliano Milton sent at 5/4/2020  1:57 PM EDT -----  Regarding: NP, [place]/Refill  Env Medication Refill    Caller (if not patient):      Relationship of caller (if not patient):      Best contact number(s): [phone]      Name of medication and dosage if known: Adderall 20mg      Is patient out of this medication (yes/no): Yes       Pharmacy name: [facility] listed in chart? (yes/no): Yes     Pharmacy phone number: [phone]      Details to clarify the request:      Juilano Rose      . Requested Prescriptions     Pending Prescriptions Disp Refills    dextroamphetamine-amphetamine (ADDERALL) 20 mg tablet 60 Tab 0     Sig: Take 1 Tab by mouth two (2) times a day. Max Daily Amount: 40 mg. Appointment due.    last refill:4/3/2020  [facility]#[phone]

## 2020-05-04 NOTE — TELEPHONE ENCOUNTER
Request for refill. Check . Last Visit: 3/13/20  MD Bk Santos  Next Appointment: Not scheduled  Previous Refill Encounter(s): 4/3/20  #60    Requested Prescriptions     Pending Prescriptions Disp Refills    dextroamphetamine-amphetamine (ADDERALL) 20 mg tablet 60 Tab 0     Sig: Take 1 Tab by mouth two (2) times a day. Max Daily Amount: 40 mg. Appointment due.

## 2020-06-08 DIAGNOSIS — F90.9 ATTENTION DEFICIT HYPERACTIVITY DISORDER (ADHD), UNSPECIFIED ADHD TYPE: ICD-10-CM

## 2020-06-08 NOTE — TELEPHONE ENCOUNTER
No access to        Last visit 03/13/2020 MD Bk Santos  Next appointment 06/11/2020 NP Koko   Previous refill encounter(s) 05/04/2020 Adderall #60     Requested Prescriptions     Pending Prescriptions Disp Refills    dextroamphetamine-amphetamine (ADDERALL) 20 mg tablet 60 Tab 0     Sig: Take 1 Tab by mouth two (2) times a day. Max Daily Amount: 40 mg.

## 2020-06-08 NOTE — TELEPHONE ENCOUNTER
----- Message from Gayle Phillips sent at 6/6/2020  1:37 PM EDT -----  Regarding: [place]/Refill  Contact: [phone]  Pt is requesting a Rx refill on \"Adderall 20 mg\" to be called into [facility] listed on file. Pt advised that she is completely out of medication.

## 2020-06-08 NOTE — TELEPHONE ENCOUNTER
Pt req refill    . Requested Prescriptions     Pending Prescriptions Disp Refills    dextroamphetamine-amphetamine (ADDERALL) 20 mg tablet 60 Tab 0     Sig: Take 1 Tab by mouth two (2) times a day. Max Daily Amount: 40 mg. Appointment due.          [facility]#[phone]    [facility], March 13, 2020 03:45  UOVThursday, June 11, 2020 03:00

## 2020-06-09 RX ORDER — DEXTROAMPHETAMINE SACCHARATE, AMPHETAMINE ASPARTATE, DEXTROAMPHETAMINE SULFATE AND AMPHETAMINE SULFATE 5; 5; 5; 5 MG/1; MG/1; MG/1; MG/1
20 TABLET ORAL 2 TIMES DAILY
Qty: 60 TAB | Refills: 0 | Status: SHIPPED | OUTPATIENT
Start: 2020-06-09 | End: 2020-07-08 | Stop reason: SDUPTHER

## 2020-06-09 NOTE — TELEPHONE ENCOUNTER
Pt called today again req her rx for adderall filled. She is extremely frustrated, pt has not had her medication for five days now. She is strongly asking that her rx gets filled.     UOVThursday, June 11, 2020 03:00 PM    [facility]#[phone] (H)

## 2020-06-10 NOTE — TELEPHONE ENCOUNTER
Patient informed via mychart of prescription sent to pharmacy as written by [facility], NP.   Dennie Silvan, LPN

## 2020-06-11 ENCOUNTER — VIRTUAL VISIT (OUTPATIENT)
Dept: FAMILY MEDICINE CLINIC | Age: 30
End: 2020-06-11

## 2020-06-11 DIAGNOSIS — G43.009 MIGRAINE WITHOUT AURA AND WITHOUT STATUS MIGRAINOSUS, NOT INTRACTABLE: ICD-10-CM

## 2020-06-11 DIAGNOSIS — F90.9 ATTENTION DEFICIT HYPERACTIVITY DISORDER (ADHD), UNSPECIFIED ADHD TYPE: Primary | ICD-10-CM

## 2020-06-11 DIAGNOSIS — F17.200 SMOKER: ICD-10-CM

## 2020-06-11 RX ORDER — LEVONORGESTREL 52 MG/1
INTRAUTERINE DEVICE INTRAUTERINE
COMMUNITY
End: 2020-06-11 | Stop reason: SDUPTHER

## 2020-06-11 NOTE — PROGRESS NOTES
HISTORY OF PRESENT ILLNESS  Larna Galeazzi is a 27 y.o. female. Virtual follow up office visit. Consent: Larna Galeazzi, who was seen by synchronous (real-time) audio-video technology, and/or her healthcare decision maker, is aware that this patient-initiated, Telehealth encounter on 6/11/2020 is a billable service, with coverage as determined by her insurance carrier. She is aware that she may receive a bill and has provided verbal consent to proceed: Yes. Clenton ReasLarna Galeazzi is a 27 y.o. female who was evaluated by an audio-video encounter for concerns as above. Patient identification was verified prior to start of the visit. A caregiver was present when appropriate. Due to this being a TeleHealth encounter (During [facility]-03 public health emergency), evaluation of the following organ systems was limited: Vitals/Constitutional/EENT/Resp/CV/GI//MS/Neuro/Skin/Heme-Lymph-Imm. Pursuant to the emergency declaration under the [facility], 1135 waiver authority and the [employer] Act, this Virtual Visit was conducted, with patient's (and/or legal guardian's) consent, to reduce the patient's risk of exposure to COVID-19 and provide necessary medical care. Services were provided through a synchronous discussion virtually to substitute for in-person clinic visit. I was in the office. The patient was at home. HPI  ADHD. Has been working at [employer]. Taking prescribed adderall daily. Medication helps with focus, organization and staying on task. Reports no adverse side effects of medication. Controlled substance contract signed in chart. Migraine headaches. Occurrence is variable. Usually about 2-3 x month lasting 1-3 days. Usually triggered by weather changes. Using fioricet with adequate sx relief. Began topamax at last OV. Frequency has decreased since adding topamax. Smoker.   Stopped smoking cigarettes about 6 months ago. Will vape on occasion. Patient Active Problem List   Diagnosis Code    JAQUAN (generalized anxiety disorder) F41.1    Attention deficit hyperactivity disorder (ADHD) F90.9    Migraine without aura and without status migrainosus, not intractable G43.009    Obesity, morbid (HCC) E66.01     Current Outpatient Medications   Medication Sig    dextroamphetamine-amphetamine (ADDERALL) 20 mg tablet Take 1 Tab by mouth two (2) times a day. Max Daily Amount: 40 mg.    butalbital-acetaminophen-caffeine (FIORICET, ESGIC) -40 mg per tablet Take 1 Tab by mouth every four (4) hours as needed for Headache.  topiramate (TOPAMAX) 50 mg tablet TAKE 1 TABLET BY MOUTH EVERY DAY AT NIGHT    levonorgestrel (MIRENA) 20 mcg/24 hours (5 yrs) 52 mg IUD 1 Device by IntraUTERine route once.  ibuprofen (MOTRIN) 800 mg tablet Take 1 Tab by mouth every eight (8) hours.  cyanocobalamin (VITAMIN B-12) 100 mcg tablet Take 100 mcg by mouth daily.  ferrous sulfate (IRON) 325 mg (65 mg iron) tablet Take  by mouth Daily (before breakfast). No current facility-administered medications for this visit. Social History     Tobacco Use    Smoking status: Former Smoker     Packs/day: 1.00     Years: 12.00     Pack years: 12.00     Types: Cigarettes     Last attempt to quit: 2019     Years since quittin.5    Smokeless tobacco: Never Used    Tobacco comment: vapes occasionally   Substance Use Topics    Alcohol use: No    Drug use: No       Review of Systems   Constitutional: Negative. Respiratory: Negative for cough and shortness of breath. Cardiovascular: Negative for chest pain, palpitations and leg swelling. Neurological: Positive for headaches. Negative for dizziness, sensory change and weakness. Psychiatric/Behavioral: Negative. All other systems reviewed and are negative. Physical Exam  Constitutional:       General: She is not in acute distress.   Pulmonary: Effort: Pulmonary effort is normal. No respiratory distress. Neurological:      Mental Status: She is alert. Psychiatric:         Mood and Affect: Mood normal.         Behavior: Behavior normal.         Thought Content: Thought content normal.         ASSESSMENT and PLAN  Diagnoses and all orders for this visit:    1. Attention deficit hyperactivity disorder (ADHD), unspecified ADHD type  Stable on adderall. 2. Migraine without aura and without status migrainosus, not intractable  Improved with daily topamax. Continue current treatment. 3. Smoker  Commended on smoking cessation. Potential risks of vaping reviewed. Follow up OV 4 months or sooner prn. We discussed the expected course, resolution and complications of the diagnosis in detail. Medication risks, benefits, costs, interactions and side effects were discussed. The patient was advised to contact the office for worsening of condition or FTI as anticipated. The patient expressed understanding of the diagnosis and plan.

## 2020-06-11 NOTE — PROGRESS NOTES
Chief Complaint   Patient presents with    Behavioral Problem     follow up     Medication Refill     ferrous sulfate      1. Have you been to the ER, urgent care clinic since your last visit? Hospitalized since your last visit? No    2. Have you seen or consulted any other health care providers outside of the [address] [place] [address] since your last visit? Include any pap smears or colon screening.  No

## 2020-07-08 DIAGNOSIS — F90.9 ATTENTION DEFICIT HYPERACTIVITY DISORDER (ADHD), UNSPECIFIED ADHD TYPE: ICD-10-CM

## 2020-07-08 RX ORDER — DEXTROAMPHETAMINE SACCHARATE, AMPHETAMINE ASPARTATE, DEXTROAMPHETAMINE SULFATE AND AMPHETAMINE SULFATE 5; 5; 5; 5 MG/1; MG/1; MG/1; MG/1
20 TABLET ORAL 2 TIMES DAILY
Qty: 60 TAB | Refills: 0 | Status: SHIPPED | OUTPATIENT
Start: 2020-07-08 | End: 2020-08-05 | Stop reason: SDUPTHER

## 2020-07-08 NOTE — TELEPHONE ENCOUNTER
Pt req refill Ramon Chen Requested Prescriptions Pending Prescriptions Disp Refills  dextroamphetamine-amphetamine (ADDERALL) 20 mg tablet 60 Tab 0 Sig: Take 1 Tab by mouth two (2) times a day. Max Daily Amount: 40 mg. [facility]#[phone]

## 2020-07-08 NOTE — TELEPHONE ENCOUNTER
No access to  Last visit 06/11/2020 Virtual visit MALGORZATA Sutherland Next appointment 4 months (10/2020) Previous refill encounter(s) 06/09/2020 Adderall #60 Requested Prescriptions Pending Prescriptions Disp Refills  dextroamphetamine-amphetamine (ADDERALL) 20 mg tablet 60 Tab 0 Sig: Take 1 Tab by mouth two (2) times a day. Max Daily Amount: 40 mg.

## 2020-08-05 DIAGNOSIS — F90.9 ATTENTION DEFICIT HYPERACTIVITY DISORDER (ADHD), UNSPECIFIED ADHD TYPE: ICD-10-CM

## 2020-08-05 RX ORDER — DEXTROAMPHETAMINE SACCHARATE, AMPHETAMINE ASPARTATE, DEXTROAMPHETAMINE SULFATE AND AMPHETAMINE SULFATE 5; 5; 5; 5 MG/1; MG/1; MG/1; MG/1
20 TABLET ORAL 2 TIMES DAILY
Qty: 60 TAB | Refills: 0 | Status: SHIPPED | OUTPATIENT
Start: 2020-08-05 | End: 2020-09-03 | Stop reason: SDUPTHER

## 2020-08-05 NOTE — TELEPHONE ENCOUNTER
PCP: Dylan Godinez NP    Last appt: 6/11/2020  No future appointments. Requested Prescriptions     Pending Prescriptions Disp Refills    dextroamphetamine-amphetamine (ADDERALL) 20 mg tablet 60 Tab 0     Sig: Take 1 Tab by mouth two (2) times a day. Max Daily Amount: 40 mg.

## 2020-08-18 ENCOUNTER — TELEPHONE (OUTPATIENT)
Dept: FAMILY MEDICINE CLINIC | Age: 30
End: 2020-08-18

## 2020-08-18 NOTE — TELEPHONE ENCOUNTER
----- Message from May Aliza sent at 8/17/2020 11:00 AM EDT -----  Regarding: NP [place]/telephone  Contact: [phone]  Caller's first and last name: pt  Reason for call: 's note to return to work   Callback required yes/no and why: Yes, scheduling  Best contact number(s): [phone]  Details to clarify the request: Needs note after Covid scare at work (results from person with scare came back negative)

## 2020-08-19 NOTE — TELEPHONE ENCOUNTER
Returned call to patient at [phone] , constantly rings no answer. Patient was sent a [employer] message inquiring about her request for return to work, no noted reference to being out of work in virtual visit.   Zenaida Alvarenga LPN

## 2020-09-03 DIAGNOSIS — G43.009 MIGRAINE WITHOUT AURA AND WITHOUT STATUS MIGRAINOSUS, NOT INTRACTABLE: ICD-10-CM

## 2020-09-03 DIAGNOSIS — F90.9 ATTENTION DEFICIT HYPERACTIVITY DISORDER (ADHD), UNSPECIFIED ADHD TYPE: ICD-10-CM

## 2020-09-03 RX ORDER — DEXTROAMPHETAMINE SACCHARATE, AMPHETAMINE ASPARTATE, DEXTROAMPHETAMINE SULFATE AND AMPHETAMINE SULFATE 5; 5; 5; 5 MG/1; MG/1; MG/1; MG/1
20 TABLET ORAL 2 TIMES DAILY
Qty: 60 TAB | Refills: 0 | Status: SHIPPED | OUTPATIENT
Start: 2020-09-03 | End: 2020-10-05 | Stop reason: SDUPTHER

## 2020-09-03 RX ORDER — BUTALBITAL, ACETAMINOPHEN AND CAFFEINE 50; 325; 40 MG/1; MG/1; MG/1
1 TABLET ORAL
Qty: 30 TAB | Refills: 1 | Status: SHIPPED | OUTPATIENT
Start: 2020-09-03 | End: 2021-02-01 | Stop reason: SDUPTHER

## 2020-11-02 DIAGNOSIS — F90.9 ATTENTION DEFICIT HYPERACTIVITY DISORDER (ADHD), UNSPECIFIED ADHD TYPE: ICD-10-CM

## 2020-11-02 RX ORDER — DEXTROAMPHETAMINE SACCHARATE, AMPHETAMINE ASPARTATE, DEXTROAMPHETAMINE SULFATE AND AMPHETAMINE SULFATE 5; 5; 5; 5 MG/1; MG/1; MG/1; MG/1
20 TABLET ORAL 2 TIMES DAILY
Qty: 60 TAB | Refills: 0 | Status: SHIPPED | OUTPATIENT
Start: 2020-11-02 | End: 2020-12-03 | Stop reason: SDUPTHER

## 2020-11-02 NOTE — TELEPHONE ENCOUNTER
Chief Complaint   Patient presents with    Medication Refill     Adderall 20 mg tablet      Patient last visit , virtually 6/11/2020.   Louis Van LPN

## 2020-12-15 ENCOUNTER — TELEPHONE (OUTPATIENT)
Dept: FAMILY MEDICINE CLINIC | Age: 30
End: 2020-12-15

## 2020-12-15 NOTE — TELEPHONE ENCOUNTER
----- Message from Oma Wilson sent at 12/10/2020  9:20 AM EST -----  Regarding: NP. [place]/ telephone  Appointment not available    Caller's first and last name and relationship to patient (if not the patient): pt       Best contact number: [phone]      Preferred date and time: Today       Scheduled appointment date and time:      Reason for appointment: Pap only and STD testing       Details to clarify the request: pt is open to see another provider at the practice       Oma Wilson

## 2020-12-31 DIAGNOSIS — F90.9 ATTENTION DEFICIT HYPERACTIVITY DISORDER (ADHD), UNSPECIFIED ADHD TYPE: ICD-10-CM

## 2020-12-31 RX ORDER — DEXTROAMPHETAMINE SACCHARATE, AMPHETAMINE ASPARTATE, DEXTROAMPHETAMINE SULFATE AND AMPHETAMINE SULFATE 5; 5; 5; 5 MG/1; MG/1; MG/1; MG/1
20 TABLET ORAL 2 TIMES DAILY
Qty: 60 TAB | Refills: 0 | Status: CANCELLED | OUTPATIENT
Start: 2020-12-31

## 2021-01-04 DIAGNOSIS — F90.9 ATTENTION DEFICIT HYPERACTIVITY DISORDER (ADHD), UNSPECIFIED ADHD TYPE: ICD-10-CM

## 2021-01-04 RX ORDER — DEXTROAMPHETAMINE SACCHARATE, AMPHETAMINE ASPARTATE, DEXTROAMPHETAMINE SULFATE AND AMPHETAMINE SULFATE 5; 5; 5; 5 MG/1; MG/1; MG/1; MG/1
20 TABLET ORAL 2 TIMES DAILY
Qty: 60 TAB | Refills: 0 | OUTPATIENT
Start: 2021-01-04

## 2021-01-04 NOTE — TELEPHONE ENCOUNTER
Requested Prescriptions     Pending Prescriptions Disp Refills    dextroamphetamine-amphetamine (ADDERALL) 20 mg tablet 60 Tab 0     Sig: Take 1 Tab by mouth two (2) times a day. Max Daily Amount: 40 mg. Appointment due. No future appointment made.

## 2021-01-06 NOTE — TELEPHONE ENCOUNTER
----- Message from Renita Suarez sent at 1/6/2021 11:03 AM EST -----  Regarding: Np C [place]/Refill  Medication Refill    Caller (if not patient):      Relationship of caller (if not patient):      Best contact number(s):[phone]      Name of medication and dosage if known:  Adderall      Is patient out of this medication (yes/no):yes      Pharmacy name:[facility] listed in chart? (yes/no):yes  Pharmacy phone number:      Details to clarify the request:patient needs her Adderall today called into [facility] pharmacy at [place] [address]

## 2021-01-06 NOTE — TELEPHONE ENCOUNTER
----- Message from Mina Lesches sent at 1/6/2021 11:03 AM EST -----  Regarding: Np C [place]/Refill  Medication Refill    Caller (if not patient):      Relationship of caller (if not patient):      Best contact number(s):[phone]      Name of medication and dosage if known:  Adderall      Is patient out of this medication (yes/no):yes      Pharmacy name:[facility] listed in chart? (yes/no):yes  Pharmacy phone number:      Details to clarify the request:patient needs her Adderall today called into [facility] pharmacy at [place] [address]

## 2021-01-06 NOTE — TELEPHONE ENCOUNTER
Outbound call to [phone]; verified , patient states she has an appointment 2021.     [facility]#[phone]

## 2021-01-06 NOTE — TELEPHONE ENCOUNTER
Patient requesting refill  . Requested Prescriptions     Refused Prescriptions Disp Refills    dextroamphetamine-amphetamine (ADDERALL) 20 mg tablet 60 Tab 0     Sig: Take 1 Tab by mouth two (2) times a day. Max Daily Amount: 40 mg. Appointment due.      Refused By: Bertram Key     Reason for Refusal: Appt required, please call patient       G#[phone]

## 2021-01-07 ENCOUNTER — VIRTUAL VISIT (OUTPATIENT)
Dept: FAMILY MEDICINE CLINIC | Age: 31
End: 2021-01-07
Payer: COMMERCIAL

## 2021-01-07 DIAGNOSIS — G43.009 MIGRAINE WITHOUT AURA AND WITHOUT STATUS MIGRAINOSUS, NOT INTRACTABLE: ICD-10-CM

## 2021-01-07 DIAGNOSIS — E66.01 OBESITY, MORBID (HCC): ICD-10-CM

## 2021-01-07 DIAGNOSIS — F90.9 ATTENTION DEFICIT HYPERACTIVITY DISORDER (ADHD), UNSPECIFIED ADHD TYPE: Primary | ICD-10-CM

## 2021-01-07 PROCEDURE — 99213 OFFICE O/P EST LOW 20 MIN: CPT | Performed by: NURSE PRACTITIONER

## 2021-01-07 RX ORDER — DEXTROAMPHETAMINE SACCHARATE, AMPHETAMINE ASPARTATE, DEXTROAMPHETAMINE SULFATE AND AMPHETAMINE SULFATE 5; 5; 5; 5 MG/1; MG/1; MG/1; MG/1
20 TABLET ORAL 2 TIMES DAILY
Qty: 60 TAB | Refills: 0 | Status: SHIPPED | OUTPATIENT
Start: 2021-01-07 | End: 2021-02-01 | Stop reason: SDUPTHER

## 2021-01-07 NOTE — TELEPHONE ENCOUNTER
Requested Prescriptions     Pending Prescriptions Disp Refills    dextroamphetamine-amphetamine (ADDERALL) 20 mg tablet 60 Tab 0     Sig: Take 1 Tab by mouth two (2) times a day. Max Daily Amount: 40 mg. Appointment due. ORDER CANCELLED PATIENT HAS APPOINTMENT 1/7/2021.

## 2021-01-07 NOTE — PROGRESS NOTES
Chief Complaint   Patient presents with    Medication Refill     adderall       1. Have you been to the ER, urgent care clinic since your last visit? Hospitalized since your last visit? No    2. Have you seen or consulted any other health care providers outside of the [address] [place] [address] since your last visit? Include any pap smears or colon screening.  No    3 most recent PHQ Screens 6/11/2020   Little interest or pleasure in doing things Not at all   Feeling down, depressed, irritable, or hopeless Not at all   Total Score PHQ 2 0

## 2021-02-01 DIAGNOSIS — G43.009 MIGRAINE WITHOUT AURA AND WITHOUT STATUS MIGRAINOSUS, NOT INTRACTABLE: ICD-10-CM

## 2021-02-01 DIAGNOSIS — F90.9 ATTENTION DEFICIT HYPERACTIVITY DISORDER (ADHD), UNSPECIFIED ADHD TYPE: ICD-10-CM

## 2021-02-01 RX ORDER — BUTALBITAL, ACETAMINOPHEN AND CAFFEINE 50; 325; 40 MG/1; MG/1; MG/1
1 TABLET ORAL
Qty: 30 TAB | Refills: 1 | Status: SHIPPED | OUTPATIENT
Start: 2021-02-01 | End: 2021-07-30 | Stop reason: SDUPTHER

## 2021-02-01 RX ORDER — DEXTROAMPHETAMINE SACCHARATE, AMPHETAMINE ASPARTATE, DEXTROAMPHETAMINE SULFATE AND AMPHETAMINE SULFATE 5; 5; 5; 5 MG/1; MG/1; MG/1; MG/1
20 TABLET ORAL 2 TIMES DAILY
Qty: 60 TAB | Refills: 0 | Status: SHIPPED | OUTPATIENT
Start: 2021-02-01 | End: 2021-03-03 | Stop reason: SDUPTHER

## 2021-02-01 NOTE — TELEPHONE ENCOUNTER
Chief Complaint   Patient presents with    Medication Refill     Adderall 20 mg tablet, Fioricet 5-325-40 mg tablet     Patient last visit ( virtual ) 1/7/2021. Maribeth Funk LPN  Patient last labs 2018 and controlled substance agreement 11/2019. Patient sent [employer] message informing that updated agreement is required.   Maribeth Funk LPN

## 2021-03-03 DIAGNOSIS — F90.9 ATTENTION DEFICIT HYPERACTIVITY DISORDER (ADHD), UNSPECIFIED ADHD TYPE: ICD-10-CM

## 2021-03-04 RX ORDER — DEXTROAMPHETAMINE SACCHARATE, AMPHETAMINE ASPARTATE, DEXTROAMPHETAMINE SULFATE AND AMPHETAMINE SULFATE 5; 5; 5; 5 MG/1; MG/1; MG/1; MG/1
20 TABLET ORAL 2 TIMES DAILY
Qty: 60 TAB | Refills: 0 | Status: SHIPPED | OUTPATIENT
Start: 2021-03-04 | End: 2021-03-29 | Stop reason: SDUPTHER

## 2021-03-11 ENCOUNTER — OFFICE VISIT (OUTPATIENT)
Dept: FAMILY MEDICINE CLINIC | Age: 31
End: 2021-03-11
Payer: COMMERCIAL

## 2021-03-11 VITALS
HEART RATE: 90 BPM | SYSTOLIC BLOOD PRESSURE: 128 MMHG | BODY MASS INDEX: 40.97 KG/M2 | HEIGHT: 64 IN | WEIGHT: 240 LBS | TEMPERATURE: 97.8 F | RESPIRATION RATE: 16 BRPM | DIASTOLIC BLOOD PRESSURE: 81 MMHG

## 2021-03-11 DIAGNOSIS — U07.1 COVID-19: Primary | ICD-10-CM

## 2021-03-11 PROCEDURE — 99212 OFFICE O/P EST SF 10 MIN: CPT | Performed by: NURSE PRACTITIONER

## 2021-03-11 RX ORDER — BISMUTH SUBSALICYLATE 262 MG
1 TABLET,CHEWABLE ORAL DAILY
COMMUNITY

## 2021-03-11 NOTE — PROGRESS NOTES
HISTORY OF PRESENT ILLNESS  Yadira Echols is a 27 y.o. female. HPI  Needs return to work note. Tested positive for COVID 19 two weeks ago after boyfriend had positive test.  Only symptom was loss of taste/smell which has persisted. Would like to return to work in am.  Clearance required by employer. No repeat test required. Past medical history, social history, family history and medications were reviewed and updated. Blood pressure 128/81, pulse 90, temperature 97.8 °F (36.6 °C), temperature source Temporal, resp. rate 16, height 5' 4\" (1.626 m), weight 240 lb (108.9 kg), not currently breastfeeding. Review of Systems   Constitutional: Negative for chills, fever and malaise/fatigue. HENT: Negative for congestion, sinus pain and sore throat. Diminished taste and smell. Respiratory: Negative for cough and shortness of breath. Cardiovascular: Negative for chest pain and palpitations. All other systems reviewed and are negative. Physical Exam  Constitutional:       General: She is not in acute distress. Neck:      Musculoskeletal: Neck supple. Cardiovascular:      Rate and Rhythm: Normal rate and regular rhythm. Heart sounds: Normal heart sounds. Pulmonary:      Effort: Pulmonary effort is normal.      Breath sounds: Normal breath sounds. Lymphadenopathy:      Cervical: No cervical adenopathy. Skin:     General: Skin is warm and dry. Neurological:      Mental Status: She is alert. ASSESSMENT and PLAN  Diagnoses and all orders for this visit:    1. COVID-19  Completed recommended quarantine. Return to work note faxed to employer. Patient given copy. Follow up prn. We discussed the expected course, resolution and complications of the diagnosis in detail. Medication risks, benefits, costs, interactions and side effects were discussed. The patient was advised to contact the office for worsening of condition or FTI as anticipated.   The patient expressed understanding of the diagnosis and plan.

## 2021-03-11 NOTE — PROGRESS NOTES
Identified pt with two pt identifiers(name and ). Reviewed record in preparation for visit and have obtained necessary documentation. Chief Complaint   Patient presents with    Documentation     Pt states she tested positive for COVID-19 3 weeks ago. needs note to return to work. Pt states she no longer has symptoms        Health Maintenance Due   Topic    Hepatitis C Screening     COVID-19 Vaccine (1 of 2)    Flu Vaccine (1)        Visit Vitals  /81 (BP 1 Location: Right arm, BP Patient Position: Sitting, BP Cuff Size: Large adult)   Pulse 90   Temp 97.8 °F (36.6 °C) (Temporal)   Resp 16   Ht 5' 4\" (1.626 m)   Wt 240 lb (108.9 kg)   BMI 41.20 kg/m²     Pain Scale: /10    Coordination of Care Questionnaire:  :   1. Have you been to the ER, urgent care clinic since your last visit? Hospitalized since your last visit? No    2. Have you seen or consulted any other health care providers outside of the [address] [place] [address] since your last visit? Include any pap smears or colon screening.  No

## 2021-03-29 DIAGNOSIS — F90.9 ATTENTION DEFICIT HYPERACTIVITY DISORDER (ADHD), UNSPECIFIED ADHD TYPE: ICD-10-CM

## 2021-03-30 RX ORDER — DEXTROAMPHETAMINE SACCHARATE, AMPHETAMINE ASPARTATE, DEXTROAMPHETAMINE SULFATE AND AMPHETAMINE SULFATE 5; 5; 5; 5 MG/1; MG/1; MG/1; MG/1
20 TABLET ORAL 2 TIMES DAILY
Qty: 60 TAB | Refills: 0 | Status: SHIPPED | OUTPATIENT
Start: 2021-03-30 | End: 2021-04-29 | Stop reason: SDUPTHER

## 2021-04-29 DIAGNOSIS — F90.9 ATTENTION DEFICIT HYPERACTIVITY DISORDER (ADHD), UNSPECIFIED ADHD TYPE: ICD-10-CM

## 2021-04-29 RX ORDER — DEXTROAMPHETAMINE SACCHARATE, AMPHETAMINE ASPARTATE, DEXTROAMPHETAMINE SULFATE AND AMPHETAMINE SULFATE 5; 5; 5; 5 MG/1; MG/1; MG/1; MG/1
20 TABLET ORAL 2 TIMES DAILY
Qty: 60 TAB | Refills: 0 | Status: SHIPPED | OUTPATIENT
Start: 2021-04-29 | End: 2021-06-01 | Stop reason: SDUPTHER

## 2021-06-01 DIAGNOSIS — F90.9 ATTENTION DEFICIT HYPERACTIVITY DISORDER (ADHD), UNSPECIFIED ADHD TYPE: ICD-10-CM

## 2021-06-01 RX ORDER — DEXTROAMPHETAMINE SACCHARATE, AMPHETAMINE ASPARTATE, DEXTROAMPHETAMINE SULFATE AND AMPHETAMINE SULFATE 5; 5; 5; 5 MG/1; MG/1; MG/1; MG/1
20 TABLET ORAL 2 TIMES DAILY
Qty: 60 TABLET | Refills: 0 | Status: SHIPPED | OUTPATIENT
Start: 2021-06-01 | End: 2021-06-28 | Stop reason: SDUPTHER

## 2021-06-01 NOTE — TELEPHONE ENCOUNTER
Requested Prescriptions     Pending Prescriptions Disp Refills    dextroamphetamine-amphetamine (ADDERALL) 20 mg tablet 60 Tablet 0     Sig: Take 1 Tablet by mouth two (2) times a day. Max Daily Amount: 40 mg.

## 2021-06-28 DIAGNOSIS — F90.9 ATTENTION DEFICIT HYPERACTIVITY DISORDER (ADHD), UNSPECIFIED ADHD TYPE: ICD-10-CM

## 2021-06-29 RX ORDER — DEXTROAMPHETAMINE SACCHARATE, AMPHETAMINE ASPARTATE, DEXTROAMPHETAMINE SULFATE AND AMPHETAMINE SULFATE 5; 5; 5; 5 MG/1; MG/1; MG/1; MG/1
20 TABLET ORAL 2 TIMES DAILY
Qty: 60 TABLET | Refills: 0 | Status: SHIPPED | OUTPATIENT
Start: 2021-06-29 | End: 2021-07-29 | Stop reason: SDUPTHER

## 2021-07-29 DIAGNOSIS — G43.009 MIGRAINE WITHOUT AURA AND WITHOUT STATUS MIGRAINOSUS, NOT INTRACTABLE: ICD-10-CM

## 2021-07-29 DIAGNOSIS — F90.9 ATTENTION DEFICIT HYPERACTIVITY DISORDER (ADHD), UNSPECIFIED ADHD TYPE: ICD-10-CM

## 2021-07-29 RX ORDER — BUTALBITAL, ACETAMINOPHEN AND CAFFEINE 50; 325; 40 MG/1; MG/1; MG/1
1 TABLET ORAL
Qty: 30 TABLET | Refills: 1 | Status: CANCELLED | OUTPATIENT
Start: 2021-07-29

## 2021-07-30 ENCOUNTER — PATIENT MESSAGE (OUTPATIENT)
Dept: FAMILY MEDICINE CLINIC | Age: 31
End: 2021-07-30

## 2021-07-30 DIAGNOSIS — G43.009 MIGRAINE WITHOUT AURA AND WITHOUT STATUS MIGRAINOSUS, NOT INTRACTABLE: ICD-10-CM

## 2021-07-30 RX ORDER — BUTALBITAL, ACETAMINOPHEN AND CAFFEINE 50; 325; 40 MG/1; MG/1; MG/1
1 TABLET ORAL
Qty: 30 TABLET | Refills: 0 | Status: SHIPPED | OUTPATIENT
Start: 2021-07-30 | End: 2021-11-01 | Stop reason: SDUPTHER

## 2021-07-30 NOTE — TELEPHONE ENCOUNTER
Manjubhavnadonna Joy (Self) [phone] (H)     Pt scheduled an appointment as requested. Pt request that her medication be refilled. Please call Pt back and let her know if the medication will be refilled or not.

## 2021-07-30 NOTE — TELEPHONE ENCOUNTER
Requested Prescriptions     Pending Prescriptions Disp Refills    butalbital-acetaminophen-caffeine (FIORICET, ESGIC) -40 mg per tablet 30 Tablet 1     Sig: Take 1 Tablet by mouth every four (4) hours as needed for Headache.

## 2021-07-30 NOTE — TELEPHONE ENCOUNTER
Pt called and stated that having her medication by Monday will not work. She is going out of town and needs her medication today. Please call Pt and advise.

## 2021-07-31 ENCOUNTER — TRANSCRIBE ORDER (OUTPATIENT)
Dept: INTERNAL MEDICINE CLINIC | Age: 31
End: 2021-07-31

## 2021-08-01 RX ORDER — DEXTROAMPHETAMINE SACCHARATE, AMPHETAMINE ASPARTATE, DEXTROAMPHETAMINE SULFATE AND AMPHETAMINE SULFATE 5; 5; 5; 5 MG/1; MG/1; MG/1; MG/1
20 TABLET ORAL 2 TIMES DAILY
Qty: 60 TABLET | Refills: 0 | Status: SHIPPED | OUTPATIENT
Start: 2021-08-01 | End: 2021-09-07 | Stop reason: SDUPTHER

## 2021-08-02 DIAGNOSIS — F90.9 ATTENTION DEFICIT HYPERACTIVITY DISORDER (ADHD), UNSPECIFIED ADHD TYPE: ICD-10-CM

## 2021-08-02 RX ORDER — DEXTROAMPHETAMINE SACCHARATE, AMPHETAMINE ASPARTATE, DEXTROAMPHETAMINE SULFATE AND AMPHETAMINE SULFATE 5; 5; 5; 5 MG/1; MG/1; MG/1; MG/1
20 TABLET ORAL 2 TIMES DAILY
Qty: 60 TABLET | Refills: 0 | Status: CANCELLED | OUTPATIENT
Start: 2021-08-02

## 2021-08-02 NOTE — TELEPHONE ENCOUNTER
----- Message from Chaim Barrientos sent at 7/31/2021  8:58 AM EDT -----  Regarding: NP [place]/ Refill  Medication Refill    Caller (if not patient): n/a      Relationship of caller (if not patient): n/a      Best contact number(s): [phone]      Name of medication and dosage if known:  Adderall 20 mg      Is patient out of this medication (yes/no): Yes      Pharmacy name: [facility]    Pharmacy listed in chart? (yes/no): Yes  Pharmacy phone number:      Details to clarify the request:      Chaim Barrientos

## 2021-08-02 NOTE — TELEPHONE ENCOUNTER
Prescription for butalbital-acetaminophen-caffeine SHIRLEY Medina) -40 mg per tablet sent to pharmacy on 7/30/2021 as written by Heber Jenkins NP.   Lydia Luciano LPN

## 2021-08-02 NOTE — TELEPHONE ENCOUNTER
Duplicate request: Adderall 20 mg #60 was sent to [facility] on 08/01/2021. No access to      E-Prescribing Status: Receipt confirmed by pharmacy (8/1/2021  2:11 PM EDT)    Requested Prescriptions     Pending Prescriptions Disp Refills    dextroamphetamine-amphetamine (ADDERALL) 20 mg tablet 60 Tablet 0     Sig: Take 1 Tablet by mouth two (2) times a day. Max Daily Amount: 40 mg.

## 2021-09-07 ENCOUNTER — VIRTUAL VISIT (OUTPATIENT)
Dept: FAMILY MEDICINE CLINIC | Age: 31
End: 2021-09-07
Payer: COMMERCIAL

## 2021-09-07 DIAGNOSIS — F90.9 ATTENTION DEFICIT HYPERACTIVITY DISORDER (ADHD), UNSPECIFIED ADHD TYPE: ICD-10-CM

## 2021-09-07 DIAGNOSIS — R21 RASH: Primary | ICD-10-CM

## 2021-09-07 PROCEDURE — 99213 OFFICE O/P EST LOW 20 MIN: CPT | Performed by: FAMILY MEDICINE

## 2021-09-07 RX ORDER — DEXTROAMPHETAMINE SACCHARATE, AMPHETAMINE ASPARTATE, DEXTROAMPHETAMINE SULFATE AND AMPHETAMINE SULFATE 5; 5; 5; 5 MG/1; MG/1; MG/1; MG/1
20 TABLET ORAL 2 TIMES DAILY
Qty: 60 TABLET | Refills: 0 | Status: SHIPPED | OUTPATIENT
Start: 2021-09-07 | End: 2021-10-04 | Stop reason: SDUPTHER

## 2021-09-07 RX ORDER — TRIAMCINOLONE ACETONIDE 1 MG/G
OINTMENT TOPICAL 2 TIMES DAILY
Qty: 30 G | Refills: 0 | Status: SHIPPED | OUTPATIENT
Start: 2021-09-07

## 2021-09-07 NOTE — PROGRESS NOTES
Rosa Maria Sheldon  32 y.o. female  1990  [address]  [id number]     [address] [place] [address] PRACTICE       Encounter Date: 9/7/2021           Established Patient Visit Note: Zelda Campbell MD    Reason for Appointment:  Chief Complaint   Patient presents with    Rash    Behavioral Problem       History of Present Illness:  History provided by patient    Rosa Maria Sheldon is a 32 y.o. female who presents today for:    ADHD  Current Meds: Adderall 20mg bid  Prior Meds: Topamax (did not work for her)  Deneies any side effects from [employer]. Denies palpatations. Has weight loss, but it chandrakant intentional.   Denies any risk from pregnancy      Rash  Duration: 3 days  Locatoin: left upper chest, she reports that it feels like it is spreading  Symptoms: she reports that it itches a lot  She reports that she was at the beach last Wednesday and she did get in the water. She denies any new soaps, detergents, or other items        Review of Systems  Review of Systems   Constitutional: Negative for chills and fever. Respiratory: Negative for cough, shortness of breath and wheezing. Cardiovascular: Negative for chest pain and palpitations. Allergies: Codeine    Medications: (Updated to reflect final medication list after visit)    Current Outpatient Medications:     dextroamphetamine-amphetamine (ADDERALL) 20 mg tablet, Take 1 Tablet by mouth two (2) times a day. Max Daily Amount: 40 mg., Disp: 60 Tablet, Rfl: 0    triamcinolone acetonide (KENALOG) 0.1 % ointment, Apply  to affected area two (2) times a day. use thin layer, Disp: 30 g, Rfl: 0    butalbital-acetaminophen-caffeine (FIORICET, ESGIC) -40 mg per tablet, Take 1 Tablet by mouth every four (4) hours as needed for Headache., Disp: 30 Tablet, Rfl: 0    multivitamin (ONE A DAY) tablet, Take 1 Tab by mouth daily. , Disp: , Rfl:     levonorgestrel (MIRENA) 20 mcg/24 hours (5 yrs) 52 mg IUD, 1 Device by IntraUTERine route once., Disp: , Rfl:     ibuprofen (MOTRIN) 800 mg tablet, Take 1 Tab by mouth every eight (8) hours. , Disp: 30 Tab, Rfl: 1    cyanocobalamin (VITAMIN B-12) 100 mcg tablet, Take 100 mcg by mouth daily. , Disp: , Rfl:     ferrous sulfate (IRON) 325 mg (65 mg iron) tablet, Take  by mouth Daily (before breakfast). , Disp: , Rfl:     History  Patient Care Team:  Melanie Khalil NP as PCP - General (Family Medicine)  Melanie Khalil NP as PCP - [facility]    Past Medical History: she has a past medical history of , Acne (3/3/2005), ADD (attention deficit disorder) (since 14yo), Allergic rhinitis, JAQUAN (generalized anxiety disorder), Genital herpes, Granuloma faciale (), Hyperhydrosis disorder (), Migraines (), Miscarriage, and S/P tonsillectomy and adenoidectomy (). She also has no past medical history of Abnormal Papanicolaou smear of cervix, Anemia, Asthma, Breast disorder, Chlamydia, Complication of anesthesia, Diabetes ([address] Disease of blood and blood forming organ, Epilepsy ([address] Essential hypertension, Gestational diabetes, Gestational hypertension, Gonorrhea, Heart abnormality, Herpes gestationis, Herpes simplex virus (HSV) infection, Human immunodeficiency virus (HIV) disease ([address] Infertility, female, Kidney disease, Liver disease, Nicotine vapor product user, Non-nicotine vapor product user, Phlebitis and thrombophlebitis, Pituitary disorder ([address] Polycystic disease, ovaries, Postpartum depression, Rhesus isoimmunization affecting pregnancy, Sickle cell disease ([address] Sickle cell trait syndrome ([address] Syphilis, Systemic lupus erythematosus ([address] Thyroid activity decreased, or Trauma. Past Surgical History: she has a past surgical history that includes hx other surgical (Bilateral, 2000) and hx other surgical (Left, ).     Family Medical History: family history includes Cancer in her paternal grandfather; Diabetes in her paternal grandmother; Heart Disease in her father; Hypertension in her mother; Other in her maternal grandmother. Social History: she reports that she quit smoking about 22 months ago. Her smoking use included cigarettes. She has a 12.00 pack-year smoking history. She has never used smokeless tobacco. She reports that she does not drink alcohol and does not use drugs. Objective:     Physical Exam    Constitutional:       General: Not in acute distress. Appearance: Normal appearance. Not ill-appearing,  Not toxic-appearing. HENT:      Head: Normocephalic. Right Ear: External ear normal.      Left Ear: External ear normal.      Nose: Nose normal.   Eyes:      General: No scleral icterus. Right eye: No discharge. Left eye: No discharge. Extraocular Movements: Extraocular movements intact. Conjunctiva/sclera: Conjunctivae normal.   Neck:      Musculoskeletal: Normal range of motion. Pulmonary:      Effort: Pulmonary effort is normal. No respiratory distress. Neurological:      Mental Status: Mental status is at baseline. Psychiatric:         Mood and Affect: Mood normal.         Behavior: Behavior normal.         Thought Content: Thought content normal.         Judgment: Judgment normal.     Raised erythematous area on right upper chest    Assessment & Plan:      ICD-10-CM ICD-9-CM    1. Rash  R21 782.1 triamcinolone acetonide (KENALOG) 0.1 % ointment   2. Attention deficit hyperactivity disorder (ADHD), unspecified ADHD type  F90.9 314.01 dextroamphetamine-amphetamine (ADDERALL) 20 mg tablet     Last PDMP Ricki as Reviewed:  Review User Review Instant Review Result   DIANA Lim 9/7/2021  1:44 PM Reviewed PDMP [1]       Rash: New problem, uncontrolled. Treat with kenalog as above. Discussed red flag symptoms and reasons to call or go to ED  ADHD: Patient's symptoms improved with Adderall. Reviewed  with no irregularities.   Discussed side effects of medication and precautions. Discussed diversion, illicit drug use, and noncompliance with controlled substance contract as grounds for stopping medication. I was in the office while conducting this encounter. Consent:  She and/or her healthcare decision maker is aware that this patient-initiated Telehealth encounter is a billable service, with coverage as determined by her insurance carrier. She is aware that she may receive a bill and has provided verbal consent to proceed: Yes    This virtual visit was conducted via [employer]. Pursuant to the emergency declaration under the [address] [place] [address], [facility] waiver authority and the [employer] Act, this Virtual  Visit was conducted to reduce the patient's risk of exposure to COVID-19 and provide continuity of care for an established patient. Services were provided through a video synchronous discussion virtually to substitute for in-person clinic visit. Due to this being a TeleHealth evaluation, many elements of the physical examination are unable to be assessed. Total Time: minutes: 21-30 minutes. I have discussed the diagnosis with the patient and the intended plan as seen in the above orders. The patient has received an after-visit summary along with patient information handout. I have discussed medication side effects and warnings with the patient as well. Disposition  Follow-up and Dispositions    · Return in about 3 months (around 12/7/2021), or if symptoms worsen or fail to improve.            Dimitrios Stout MD

## 2021-10-04 DIAGNOSIS — F90.9 ATTENTION DEFICIT HYPERACTIVITY DISORDER (ADHD), UNSPECIFIED ADHD TYPE: ICD-10-CM

## 2021-10-04 RX ORDER — DEXTROAMPHETAMINE SACCHARATE, AMPHETAMINE ASPARTATE, DEXTROAMPHETAMINE SULFATE AND AMPHETAMINE SULFATE 5; 5; 5; 5 MG/1; MG/1; MG/1; MG/1
20 TABLET ORAL 2 TIMES DAILY
Qty: 60 TABLET | Refills: 0 | Status: SHIPPED | OUTPATIENT
Start: 2021-10-04 | End: 2021-11-01 | Stop reason: SDUPTHER

## 2021-10-04 RX ORDER — DEXTROAMPHETAMINE SACCHARATE, AMPHETAMINE ASPARTATE, DEXTROAMPHETAMINE SULFATE AND AMPHETAMINE SULFATE 5; 5; 5; 5 MG/1; MG/1; MG/1; MG/1
20 TABLET ORAL 2 TIMES DAILY
Qty: 60 TABLET | Refills: 0 | Status: CANCELLED | OUTPATIENT
Start: 2021-10-04

## 2021-10-04 NOTE — TELEPHONE ENCOUNTER
Last PDMP Kaz Thornton as Reviewed:  Review User Review Instant Review Result   Joaquíndeuce Annamaria AGUAYO 10/4/2021  5:09 PM Reviewed PDMP [1]       ICD-10-CM ICD-9-CM    1.  Attention deficit hyperactivity disorder (ADHD), unspecified ADHD type  F90.9 314.01 dextroamphetamine-amphetamine (ADDERALL) 20 mg tablet     Demetra Crum MD

## 2021-11-01 DIAGNOSIS — F90.9 ATTENTION DEFICIT HYPERACTIVITY DISORDER (ADHD), UNSPECIFIED ADHD TYPE: ICD-10-CM

## 2021-11-01 DIAGNOSIS — G43.009 MIGRAINE WITHOUT AURA AND WITHOUT STATUS MIGRAINOSUS, NOT INTRACTABLE: ICD-10-CM

## 2021-11-01 NOTE — TELEPHONE ENCOUNTER
PCP: Chao See NP    Last appt: 9/7/2021  No future appointments. Requested Prescriptions     Pending Prescriptions Disp Refills    butalbital-acetaminophen-caffeine (FIORICET, ESGIC) -40 mg per tablet 30 Tablet 0     Sig: Take 1 Tablet by mouth every four (4) hours as needed for Headache.        Prior labs and Blood pressures:  BP Readings from Last 3 Encounters:   03/11/21 128/81   03/13/20 129/85   11/07/19 135/88     Lab Results   Component Value Date/Time    Sodium 136 02/20/2015 10:15 AM    Potassium 4.2 02/20/2015 10:15 AM    Chloride 98 02/20/2015 10:15 AM    CO2 22 02/20/2015 10:15 AM    Anion gap 12 07/26/2010 11:21 AM    Glucose 96 02/20/2015 10:15 AM    BUN 11 02/20/2015 10:15 AM    Creatinine 0.62 02/20/2015 10:15 AM    BUN/Creatinine ratio 18 02/20/2015 10:15 AM    GFR est  02/20/2015 10:15 AM    GFR est non- 02/20/2015 10:15 AM    Calcium 9.4 02/20/2015 10:15 AM     Lab Results   Component Value Date/Time    Hemoglobin A1c 5.9 (H) 02/20/2015 10:15 AM     No results found for: CHOL, CHOLPOCT, CHOLX, CHLST, CHOLV, HDL, HDLPOC, HDLP, LDL, LDLCPOC, LDLC, DLDLP, VLDLC, VLDL, TGLX, TRIGL, TRIGP, TGLPOCT, CHHD, CHHDX  No results found for: Ashu Cheese, VD3RIA    Lab Results   Component Value Date/Time    TSH 3.610 02/20/2015 10:15 AM

## 2021-11-02 RX ORDER — DEXTROAMPHETAMINE SACCHARATE, AMPHETAMINE ASPARTATE, DEXTROAMPHETAMINE SULFATE AND AMPHETAMINE SULFATE 5; 5; 5; 5 MG/1; MG/1; MG/1; MG/1
20 TABLET ORAL 2 TIMES DAILY
Qty: 60 TABLET | Refills: 0 | Status: SHIPPED | OUTPATIENT
Start: 2021-11-02 | End: 2021-12-13 | Stop reason: SDUPTHER

## 2021-11-02 RX ORDER — BUTALBITAL, ACETAMINOPHEN AND CAFFEINE 50; 325; 40 MG/1; MG/1; MG/1
1 TABLET ORAL
Qty: 30 TABLET | Refills: 0 | Status: SHIPPED | OUTPATIENT
Start: 2021-11-02 | End: 2022-03-17 | Stop reason: SDUPTHER

## 2021-11-02 NOTE — TELEPHONE ENCOUNTER
Called Pt and unable to LVM stating that they are due for a medication check-in and to please call the office back for scheduling.  Both numbers listed are invalid

## 2021-11-05 ENCOUNTER — TELEPHONE (OUTPATIENT)
Dept: FAMILY MEDICINE CLINIC | Age: 31
End: 2021-11-05

## 2021-11-05 NOTE — TELEPHONE ENCOUNTER
Chief Complaint   Patient presents with    Prior Auth     Amphetamine-Dextroamphetamine 20MG tablets:  APPROVED     [facility] pharmacy was faxed approval notification at [phone] with confirmation received.   Edward Saenz, RAMIRO

## 2021-12-09 DIAGNOSIS — F90.9 ATTENTION DEFICIT HYPERACTIVITY DISORDER (ADHD), UNSPECIFIED ADHD TYPE: ICD-10-CM

## 2021-12-10 RX ORDER — DEXTROAMPHETAMINE SACCHARATE, AMPHETAMINE ASPARTATE, DEXTROAMPHETAMINE SULFATE AND AMPHETAMINE SULFATE 5; 5; 5; 5 MG/1; MG/1; MG/1; MG/1
20 TABLET ORAL 2 TIMES DAILY
Qty: 60 TABLET | Refills: 0 | OUTPATIENT
Start: 2021-12-10

## 2021-12-13 ENCOUNTER — VIRTUAL VISIT (OUTPATIENT)
Dept: FAMILY MEDICINE CLINIC | Age: 31
End: 2021-12-13
Payer: COMMERCIAL

## 2021-12-13 DIAGNOSIS — F90.9 ATTENTION DEFICIT HYPERACTIVITY DISORDER (ADHD), UNSPECIFIED ADHD TYPE: ICD-10-CM

## 2021-12-13 DIAGNOSIS — G43.009 MIGRAINE WITHOUT AURA AND WITHOUT STATUS MIGRAINOSUS, NOT INTRACTABLE: Primary | ICD-10-CM

## 2021-12-13 PROCEDURE — 99213 OFFICE O/P EST LOW 20 MIN: CPT | Performed by: FAMILY MEDICINE

## 2021-12-13 RX ORDER — DEXTROAMPHETAMINE SACCHARATE, AMPHETAMINE ASPARTATE, DEXTROAMPHETAMINE SULFATE AND AMPHETAMINE SULFATE 5; 5; 5; 5 MG/1; MG/1; MG/1; MG/1
20 TABLET ORAL 2 TIMES DAILY
Qty: 60 TABLET | Refills: 0 | Status: SHIPPED | OUTPATIENT
Start: 2021-12-13 | End: 2022-01-07 | Stop reason: SDUPTHER

## 2021-12-13 NOTE — PROGRESS NOTES
Larna Galeazzi  32 y.o. female  1990  [address] [place] [address]   [id number]     [facility]       Encounter Date: 12/13/2021           Established Patient Visit Note: Demetra Crum MD    Reason for Appointment:  Chief Complaint   Patient presents with    Follow-up       History of Present Illness:  History provided by patient    Larna Galeazzi is a 32 y.o. female who presents today for:    ADHD  Current Meds: Adderall 20mg bid  Deneies any side effects from medicaiotns. Denies palpitations, anxiety, problem with appetite. Denies any risk from pregnancy. She reports having IUD in place. Cervical Cancer Screening: followed by GYN [facility])    · Rash: she reports that this has resolved  · Migraines: she reports that she has had an aura once or twice. She reports being on dopamax in the past. She reports getting them amount once per month. Precipitated by bad weather. She is interested in botox treatment. Review of Systems  Review of Systems   Constitutional: Negative for chills and fever. Respiratory: Negative for cough, shortness of breath and wheezing. Cardiovascular: Negative for chest pain and palpitations. Allergies: Codeine    Medications: (Updated to reflect final medication list after visit)    Current Outpatient Medications:     dextroamphetamine-amphetamine (ADDERALL) 20 mg tablet, Take 1 Tablet by mouth two (2) times a day. Max Daily Amount: 40 mg., Disp: 60 Tablet, Rfl: 0    butalbital-acetaminophen-caffeine (FIORICET, ESGIC) -40 mg per tablet, Take 1 Tablet by mouth every four (4) hours as needed for Headache., Disp: 30 Tablet, Rfl: 0    triamcinolone acetonide (KENALOG) 0.1 % ointment, Apply  to affected area two (2) times a day. use thin layer, Disp: 30 g, Rfl: 0    multivitamin (ONE A DAY) tablet, Take 1 Tab by mouth daily. , Disp: , Rfl:     levonorgestrel (MIRENA) 20 mcg/24 hours (5 yrs) 52 mg IUD, 1 Device by IntraUTERine route once., Disp: , Rfl:     ibuprofen (MOTRIN) 800 mg tablet, Take 1 Tab by mouth every eight (8) hours. , Disp: 30 Tab, Rfl: 1    cyanocobalamin (VITAMIN B-12) 100 mcg tablet, Take 100 mcg by mouth daily. , Disp: , Rfl:     ferrous sulfate (IRON) 325 mg (65 mg iron) tablet, Take  by mouth Daily (before breakfast). , Disp: , Rfl:     History  Patient Care Team:  Shanon Hill MD as PCP - General (Family Medicine)  Shanon Hill MD as PCP - [address] [place] [address] [facility]    Past Medical History: she has a past medical history of , Acne (3/3/2005), ADD (attention deficit disorder) (since 14yo), Allergic rhinitis, JAQUAN (generalized anxiety disorder), Genital herpes, Granuloma faciale (), Hyperhydrosis disorder (), Migraines (), Miscarriage, and S/P tonsillectomy and adenoidectomy (). She has no past medical history of Abnormal Papanicolaou smear of cervix, Anemia, Asthma, Breast disorder, Chlamydia, Complication of anesthesia, Diabetes ([address] Disease of blood and blood forming organ, Epilepsy ([address] Essential hypertension, Gestational diabetes, Gestational hypertension, Gonorrhea, Heart abnormality, Herpes gestationis, Herpes simplex virus (HSV) infection, Human immunodeficiency virus (HIV) disease ([address] Infertility, female, Kidney disease, Liver disease, Nicotine vapor product user, Non-nicotine vapor product user, Phlebitis and thrombophlebitis, Pituitary disorder ([address] Polycystic disease, ovaries, Postpartum depression, Rhesus isoimmunization affecting pregnancy, Sickle cell disease ([address] Sickle cell trait syndrome ([address] Syphilis, Systemic lupus erythematosus ([address] Thyroid activity decreased, or Trauma. Past Surgical History: she has a past surgical history that includes hx other surgical (Bilateral, 2000) and hx other surgical (Left, ).     Family Medical History: family history includes Cancer in her paternal grandfather; Diabetes in her paternal grandmother; Heart Disease in her father; Hypertension in her mother; Other in her maternal grandmother. Social History: she reports that she quit smoking about 2 years ago. Her smoking use included cigarettes. She has a 12.00 pack-year smoking history. She has never used smokeless tobacco. She reports that she does not drink alcohol and does not use drugs. Objective:     Physical Exam    Constitutional:       General: Not in acute distress. Appearance: Normal appearance. Not ill-appearing,  Not toxic-appearing. HENT:      Head: Normocephalic. Right Ear: External ear normal.      Left Ear: External ear normal.      Nose: Nose normal.   Eyes:      General: No scleral icterus. Right eye: No discharge. Left eye: No discharge. Extraocular Movements: Extraocular movements intact. Conjunctiva/sclera: Conjunctivae normal.   Neck:      Musculoskeletal: Normal range of motion. Pulmonary:      Effort: Pulmonary effort is normal. No respiratory distress. Neurological:      Mental Status: Mental status is at baseline. Psychiatric:         Mood and Affect: Mood normal.         Behavior: Behavior normal.         Thought Content: Thought content normal.         Judgment: Judgment normal.       Assessment & Plan:      ICD-10-CM ICD-9-CM    1. Migraine without aura and without status migrainosus, not intractable  G43.009 346.10 REFERRAL TO NEUROLOGY   2. Attention deficit hyperactivity disorder (ADHD), unspecified ADHD type  F90.9 314.01 dextroamphetamine-amphetamine (ADDERALL) 20 mg tablet     Last PDMP Ricki as Reviewed:  Review User Review Instant Review Result   [place] DIANA Lee 12/13/2021  8:38 AM Reviewed PDMP [1]       Migraine: Chronic, uncontrolled. Referral to neurology  ADHD: Chronic, stable. Continue current therapy. I was in the office while conducting this encounter.     Consent:  She and/or her healthcare decision maker is aware that this patient-initiated Telehealth encounter is a billable service, with coverage as determined by her insurance carrier. She is aware that she may receive a bill and has provided verbal consent to proceed: Yes    This virtual visit was conducted via [address] Pursuant to the emergency declaration under the [facility], [facility] waiver authority and the [employer] Act, this Virtual  Visit was conducted to reduce the patient's risk of exposure to COVID-19 and provide continuity of care for an established patient. Services were provided through a video synchronous discussion virtually to substitute for in-person clinic visit. Due to this being a TeleHealth evaluation, many elements of the physical examination are unable to be assessed. Total Time: minutes: 5-10 minutes. I have discussed the diagnosis with the patient and the intended plan as seen in the above orders. The patient has received an after-visit summary along with patient information handout. I have discussed medication side effects and warnings with the patient as well. Disposition  Follow-up and Dispositions    · Return in about 3 months (around 3/13/2022).            Davide Saeed MD

## 2022-01-07 DIAGNOSIS — G43.009 MIGRAINE WITHOUT AURA AND WITHOUT STATUS MIGRAINOSUS, NOT INTRACTABLE: ICD-10-CM

## 2022-01-07 DIAGNOSIS — F90.9 ATTENTION DEFICIT HYPERACTIVITY DISORDER (ADHD), UNSPECIFIED ADHD TYPE: ICD-10-CM

## 2022-01-07 RX ORDER — DEXTROAMPHETAMINE SACCHARATE, AMPHETAMINE ASPARTATE, DEXTROAMPHETAMINE SULFATE AND AMPHETAMINE SULFATE 5; 5; 5; 5 MG/1; MG/1; MG/1; MG/1
20 TABLET ORAL 2 TIMES DAILY
Qty: 60 TABLET | Refills: 0 | Status: SHIPPED | OUTPATIENT
Start: 2022-01-13 | End: 2022-02-10 | Stop reason: SDUPTHER

## 2022-02-10 DIAGNOSIS — F90.9 ATTENTION DEFICIT HYPERACTIVITY DISORDER (ADHD), UNSPECIFIED ADHD TYPE: ICD-10-CM

## 2022-02-11 RX ORDER — DEXTROAMPHETAMINE SACCHARATE, AMPHETAMINE ASPARTATE, DEXTROAMPHETAMINE SULFATE AND AMPHETAMINE SULFATE 5; 5; 5; 5 MG/1; MG/1; MG/1; MG/1
20 TABLET ORAL 2 TIMES DAILY
Qty: 60 TABLET | Refills: 0 | Status: SHIPPED | OUTPATIENT
Start: 2022-02-11 | End: 2022-03-17 | Stop reason: SDUPTHER

## 2022-02-11 NOTE — TELEPHONE ENCOUNTER
Patient mychart request for refill adderall. Check . Thanks, Alesia    Last Visit: VV 12/13/21 MD Claudetta Heman  Next Appointment: Not scheduled  Previous Refill Encounter(s): 1/13/22 60    Requested Prescriptions     Pending Prescriptions Disp Refills    dextroamphetamine-amphetamine (ADDERALL) 20 mg tablet 60 Tablet 0     Sig: Take 1 Tablet by mouth two (2) times a day for 30 days. Max Daily Amount: 40 mg.

## 2022-03-17 ENCOUNTER — VIRTUAL VISIT (OUTPATIENT)
Dept: FAMILY MEDICINE CLINIC | Age: 32
End: 2022-03-17
Payer: COMMERCIAL

## 2022-03-17 DIAGNOSIS — G43.009 MIGRAINE WITHOUT AURA AND WITHOUT STATUS MIGRAINOSUS, NOT INTRACTABLE: ICD-10-CM

## 2022-03-17 DIAGNOSIS — F90.9 ATTENTION DEFICIT HYPERACTIVITY DISORDER (ADHD), UNSPECIFIED ADHD TYPE: Primary | ICD-10-CM

## 2022-03-17 PROCEDURE — 99213 OFFICE O/P EST LOW 20 MIN: CPT | Performed by: FAMILY MEDICINE

## 2022-03-17 RX ORDER — DEXTROAMPHETAMINE SACCHARATE, AMPHETAMINE ASPARTATE, DEXTROAMPHETAMINE SULFATE AND AMPHETAMINE SULFATE 5; 5; 5; 5 MG/1; MG/1; MG/1; MG/1
20 TABLET ORAL 2 TIMES DAILY
Qty: 60 TABLET | Refills: 0 | Status: SHIPPED | OUTPATIENT
Start: 2022-03-17 | End: 2022-04-15 | Stop reason: SDUPTHER

## 2022-03-17 RX ORDER — BUTALBITAL, ACETAMINOPHEN AND CAFFEINE 50; 325; 40 MG/1; MG/1; MG/1
1 TABLET ORAL
Qty: 30 TABLET | Refills: 0 | Status: SHIPPED | OUTPATIENT
Start: 2022-03-17

## 2022-03-17 NOTE — PROGRESS NOTES
Franco Villarreal  32 y.o. female  1990  [address] [place] [address]   [id number]     [facility]       Encounter Date: 3/17/2022           Established Patient Visit Note: Kinza Chi MD    Reason for Appointment:  Chief Complaint   Patient presents with    Follow Up Chronic Condition       History of Present Illness:  History provided by patient    Franco Villarreal is a 32 y.o. female who presents today for:     ADHD  Current Meds: Adderall 20mg bid  Deneies any side effects from medicaiotns. Denies palpitations, anxiety, problem with appetite. Denies any risk from pregnancy. She reports having IUD in place. Patient reports that she has found the the Adderall is helped with focus and concentration. It also helps with finishing projects. Initial Diagnosis: she was diagnosed by primary care,  and she has been on the [facility] for several years. · Migraines: she reports that she has had an aura once or twice. Prior meds include topamax (stopped d/t poor appetite) . Precipitated by bad weather. Cervical Cancer Screening: followed by GYN [facility])          Review of Systems  Review of Systems   Constitutional: Negative for chills and fever. Respiratory: Negative for cough, shortness of breath and wheezing. Cardiovascular: Negative for chest pain and palpitations. Allergies: Codeine    Medications:     Current Outpatient Medications:     butalbital-acetaminophen-caffeine (FIORICET, ESGIC) -40 mg per tablet, Take 1 Tablet by mouth every four (4) hours as needed for Headache., Disp: 30 Tablet, Rfl: 0    ubrogepant (Ubrelvy) 100 mg tablet, Take one tab PRN migraine. Patient may take a second dose after 2 hours if headache not improved.  (Do not take more than 2 tabs in a 24 hours period), Disp: 16 Tablet, Rfl: 2    dextroamphetamine-amphetamine (ADDERALL) 20 mg tablet, Take 1 Tablet by mouth two (2) times a day for 30 days. Max Daily Amount: 40 mg., Disp: 60 Tablet, Rfl: 0    triamcinolone acetonide (KENALOG) 0.1 % ointment, Apply  to affected area two (2) times a day. use thin layer, Disp: 30 g, Rfl: 0    multivitamin (ONE A DAY) tablet, Take 1 Tab by mouth daily. , Disp: , Rfl:     levonorgestrel (MIRENA) 20 mcg/24 hours (5 yrs) 52 mg IUD, 1 Device by IntraUTERine route once., Disp: , Rfl:     ibuprofen (MOTRIN) 800 mg tablet, Take 1 Tab by mouth every eight (8) hours. , Disp: 30 Tab, Rfl: 1    cyanocobalamin (VITAMIN B-12) 100 mcg tablet, Take 100 mcg by mouth daily. , Disp: , Rfl:     ferrous sulfate (IRON) 325 mg (65 mg iron) tablet, Take  by mouth Daily (before breakfast). , Disp: , Rfl:     History  Patient Care Team:  Michelle Abraham MD as PCP - General (Family Medicine)  Michelle Abraham MD as PCP - [facility] Provider    Past Medical History: she has a past medical history of , Acne (3/3/2005), ADD (attention deficit disorder) (since 12yo), Allergic rhinitis, JAQUAN (generalized anxiety disorder), Genital herpes, Granuloma faciale (), Hyperhydrosis disorder (), Migraines (), Miscarriage, and S/P tonsillectomy and adenoidectomy ().     She has no past medical history of Abnormal Papanicolaou smear of cervix, Anemia, Asthma, Breast disorder, Chlamydia, Complication of anesthesia, Diabetes ([address] Disease of blood and blood forming organ, Epilepsy ([address] Essential hypertension, Gestational diabetes, Gestational hypertension, Gonorrhea, Heart abnormality, Herpes gestationis, Herpes simplex virus (HSV) infection, Human immunodeficiency virus (HIV) disease ([address] Infertility, female, Kidney disease, Liver disease, Nicotine vapor product user, Non-nicotine vapor product user, Phlebitis and thrombophlebitis, Pituitary disorder ([address] Polycystic disease, ovaries, Postpartum depression, Rhesus isoimmunization affecting pregnancy, Sickle cell disease ([address] Sickle cell trait syndrome ([address] Syphilis, Systemic lupus erythematosus ([facility] Thyroid activity decreased, or Trauma. Past Surgical History: she has a past surgical history that includes hx other surgical (Bilateral, 01/01/2000) and hx other surgical (Left, 1998). Family Medical History: family history includes Cancer in her paternal grandfather; Diabetes in her paternal grandmother; Heart Disease in her father; Hypertension in her mother; Other in her maternal grandmother. Social History: she reports that she quit smoking about 2 years ago. Her smoking use included cigarettes. She has a 12.00 pack-year smoking history. She has never used smokeless tobacco. She reports that she does not drink alcohol and does not use drugs. Objective:     Physical Exam    Constitutional:       General: Not in acute distress. Appearance: Normal appearance. Not ill-appearing,  Not toxic-appearing. HENT:      Head: Normocephalic. Right Ear: External ear normal.      Left Ear: External ear normal.      Nose: Nose normal.   Eyes:      General: No scleral icterus. Right eye: No discharge. Left eye: No discharge. Extraocular Movements: Extraocular movements intact. Conjunctiva/sclera: Conjunctivae normal.   Neck:      Musculoskeletal: Normal range of motion. Pulmonary:      Effort: Pulmonary effort is normal. No respiratory distress. Neurological:      Mental Status: Mental status is at baseline. Psychiatric:         Mood and Affect: Mood normal.         Behavior: Behavior normal.         Thought Content: Thought content normal.         Judgment: Judgment normal.       Assessment & Plan:      ICD-10-CM ICD-9-CM    1. Attention deficit hyperactivity disorder (ADHD), unspecified ADHD type  F90.9 314.01 DRUG ABUSE PROF, URINE (SEVEN DRUGS), MS COFIRM      dextroamphetamine-amphetamine (ADDERALL) 20 mg tablet   2.  Migraine without aura and without status migrainosus, not intractable  G43.009 346.10 butalbital-acetaminophen-caffeine (FIORICET, ESGIC) -40 mg per tablet      ubrogepant Jessica Spring) 100 mg tablet     Last PDMP Ricki as Reviewed:  Review User Review Instant Review Result   DIANA Cullen 3/17/2022  9:23 AM Reviewed PDMP [1]     ADHD: Chronic, stable. Continue current therapy. Migraines: Chronic, uncontrolled. Start Dionna Shells. Discussed not taking within 24 hours of Fioricet. I was in the office while conducting this encounter. Consent:  She and/or her healthcare decision maker is aware that this patient-initiated Telehealth encounter is a billable service, with coverage as determined by her insurance carrier. She is aware that she may receive a bill and has provided verbal consent to proceed: Yes    This virtual visit was conducted via [address] Pursuant to the emergency declaration under the [facility], [facility] waiver authority and the [employer] Act, this Virtual  Visit was conducted to reduce the patient's risk of exposure to COVID-19 and provide continuity of care for an established patient. Services were provided through a video synchronous discussion virtually to substitute for in-person clinic visit. Due to this being a TeleHealth evaluation, many elements of the physical examination are unable to be assessed. Total Time: minutes: 21-30 minutes. I have discussed the diagnosis with the patient and the intended plan as seen in the above orders. The patient has received an after-visit summary along with patient information handout. I have discussed medication side effects and warnings with the patient as well. Disposition  Follow-up and Dispositions    · Return in about 3 months (around 6/17/2022).            Opal Delatorre MD

## 2022-03-17 NOTE — LETTER
Rodger Macedo  EIL:[birth year]   MR #:[id number]   [address]   Page 1 of 5        CONTROLLED SUBSTANCE AGREEMENT     I may be prescribed medications that are controlled substances as part  of my treatment plan for management of my medical condition(s). The goal of my treatment plan is to maintain and/or improve my health and wellbeing. Because controlled substances have an increased risk of abuse or harm, continual re-evaluation is needed determine if the goals of my treatment plan are being met for my safety and the safety of others. Shelly Small  am entering into this Controlled Substance Agreement with my provider, __________________________________ at [facility] [address] . I understand that successful treatment requires mutual trust and honesty between me and my provider. I understand that there are state and federal laws and regulations which apply to the medications that my provider may prescribe that must be followed. I understand there are risks and benefits ts of taking the medicines that my provider may prescribe. I understand and agree that following this Agreement is necessary in continuing my provider-patient relationship and success of my treatment plan. As a part of my treatment plan, I agree to the following:    COMMUNICATION:    1. I will communicate fully with my provider about my medical condition(s), including the effect on my daily life and how well my medications are helping. I will tell my provider all of the medications that I take for any reason, including medications I receive from another health care provider, and will notify my provider about all issues, problems or concerns, including any side effects, which may be related to my medications. I understand that this information allows my provider to adjust my treatment plan to help manage my medical condition.  I understand that this information will become part of my permanent medical record. 2. I will notify my provider if I have a history of alcohol/drug misuse/addiction or if I have had treatment for alcohol/drug addiction in the past, or if I have a new problem with or concern about alcohol/drug use/addiction, because this increases the likelihood of high risk behaviors and may lead to serious medical conditions. 3. Females Only: I will notify my provider if I am or become pregnant, or if I intend to become pregnant, or if I intend to breastfeed. I understand that communication of these issues with my provider is important, due to possible effects my medication could have on an unborn fetus or breastfeeding child. Initials_____      Name:. Monika Ramos   NGK:[birth year]   MR #:[id number]   Office:[facility]   Page 2 of 5       MISUSE OF MEDICATIONS / DRUGS:    1. I agree to take all controlled substances as prescribed, and will not misuse or abuse any controlled substances prescribed by my provider. For my safety, I will not increase the amount of medicine I take without first talking with and getting permission from my provider. 2. If I have a medical emergency, another health care provider may prescribe me medication. If I seek emergency treatment, I will notify my provider within seventy-two (72) hours. 3. I understand that my provider may discuss my use and/or possible misuse/abuse of controlled substances and alcohol, as appropriate, with any health care provider involved in my care, pharmacist or legal authority. ILLEGAL DRUGS:    1. I will not use illegal drugs of any kind, including but not limited to marijuana, heroin, cocaine, or any prescription drug which is not prescribed to me. DRUG DIVERSION / PRESCRIPTION FRAUD:    1. I will not share, sell, trade, give away, or otherwise misuse my prescriptions or medications. 2. I will not alter any prescriptions provided to me by my provider.     SINGLE PROVIDER:    1. I agree that all controlled substances that I take will be prescribed only by my provider (or his/her covering provider) under this Agreement. This agreement does not prevent me from seeking emergency medical treatment or receiving pain management related to a surgery. PROTECTING MEDICATIONS:    1. I am responsible for keeping my prescriptions and medications in a safe and secure place including safeguarding them from loss or theft. I understand that lost, stolen or damaged/destroyed prescriptions or medications will not be replaced. Initials____          Name:Joanie Schulte   [facility]:[birth year]   MR #:[id number]   [address]   Page 3 of 5   PRESCRIPTION RENEWALS/REFILLS:    1. I will follow my controlled substance medication schedule as prescribed by my provider. 2. I understand and agree that I will make any requests for renewals or refills of my prescriptions only at the time of an office visit or during my providers regular office hours subject to the prescription refill requirements of the individual practice. 3. I understand that my provider may not call in prescriptions for controlled substances to my pharmacy. 4. I understand that my provider may adjust or discontinue these medications as deemed appropriate for my medical treatment plan. This Agreement does not guarantee the prescription of controlled medications. 5. I agree that if my medications are adjusted or discontinued, I will properly dispose of any remaining medications. I understand that I will be required to dispose of any remaining controlled medications prior to being provided with any prescriptions for other controlled medications. 6. I understand that the renewal of my prescription depends on my medical condition, my consistent participation, and my adherence with my treatment plan and this Agreement.     7. I understand that if I do not keep an appointment with my provider, I may not receive a renewal or refill for my controlled substance medication. PRESCRIPTION MONITORING / DRUG TESTIN. I understand that my provider may require me to provide urine, saliva or blood for testing at any time. I understand that this testing will be used to monitor for safety and adherence with my treatment plan and this Agreement. 2. I understand that my provider may ask me to provide an observed urine specimen, which means that a nurse or other health care provider may watch me provide urine, and I agree to cooperate if I am asked to provide an observed specimen. 3. I understand that if I do not provide urine, saliva or blood samples within two (2) hours of my providers request, or other timeframe decided by my provider, my treatment plan could be changed, or my prescriptions and medications may be changed or ended. 4. I understand that urine, saliva and blood test results will be a part of my permanent medical record. Initials_____        Name:Joanie Flores   YRS:[birth year]   MR #:[id number]   [address]   Page 4 of 5    5. I understand that my provider is required to obtain a copy of my State Prescription Monitoring Program () Report at any time in order to safely prescribe medications. 6. I will bring all of my prescribed controlled substance medications in their original bottles to all of my scheduled appointments. 7. I understand that my provider may ask me to come to the practice with all of my prescribed medications for a random pill count at any time. I agree to cooperate if I am asked to come in for a random pill count. I understand that if I do not arrive in the timeframe decided by my provider, my treatment plan could be changed, or my prescriptions and medications may be changed or ended.     COOPERATION WITH INVESTIGATIONS:    1. I authorize my provider and my pharmacy to cooperate fully with any local, state, or federal law enforcement agency in the investigation of any possible misuse, sale, or other diversion of my controlled substance prescriptions or medications. RISKS:    1. I understand that my level of consciousness may be affected from the use of controlled substances, and I understand that there are risks, benefits, effects and potential alternatives (including no treatment) to the medications that my provider has prescribed. 2. I understand that I may become drowsy, tired, dizzy, constipated, and sick to my stomach, or have changes in my mood or in my sleep while taking my medications. I have talked with my provider about these possible side effects, risks, benefits, and alternative treatments, and my provider has answered all of my questions. 3. I understand that I should not suddenly stop taking my medications without first speaking with my provider. I understand that if I suddenly stop taking my medications, I may experience nausea, vomiting, sweating,anxiety, sleeplessness, itching or other uncomfortable feelings. 4. I will not take my medications with alcohol of any kind, including beer, wine or liquor. I understand that drinking alcohol with my medications increases the chances of side effects, including breathing problems or even death. 5. I understand that if I have a history of alcoholism or other drug addiction I may be at increased risk of addiction to my medications. Signs of addiction might include craving, compulsive use, and continued use despite harm. Since addiction is a disease, I understand my provider may decide to change my medications and refer me to appropriate treatment services. I understand that this information would become part of my permanent medical record. Initials_____        Name:Joanie Lepe   JBK:[birth year]   MR #:[id number]   [address]   Page 5 of 5       6.  Females only: Children born to women who regularly take controlled substances are likely to have physical problems and suffer withdrawal symptoms at birth. If I am of child-bearing age, I understand that I should use safe and effective birth control while taking any controlled substances to avoid the impact of medications on an unborn fetus or  child. I agree to notify my provider immediately if I should become pregnant so that my treatment plan can be adjusted. 7. Males only: I understand that chronic use of controlled substances has been associated with low testosterone levels in males which may affect my mood, stamina, sexual desire, and general health. I understand that my provider may order the appropriate laboratory test to determine my testosterone level,and I agree to this testing. ADHERENCE:    1. I understand that if I do not adhere to this Agreement in any way, my provider may change my prescriptions, stop prescribing controlled substances or end our provider-patient relationship. 2. If my provider decides to stop prescribing medication, or decides to end our provider-patient relationship,my provider may require that I taper my medications slowly. If necessary, my provider may also provide a prescription for other medications to treat my withdrawal symptoms. UNDERSTANDING THIS AGREEMENT:    I understand that my provider may adjust or stop my prescriptions for controlled substances based on my medical condition and my treatment plan. I understand that this Agreement does not guarantee that I will be prescribed medications or controlled substances. I understand that controlled substances may be just one part  of my treatment plan. My initial on each page and my signature below shows that I have read each page of this Agreement, I have had an opportunity to ask questions, and all of my questions have been answered to my satisfaction by my provider.     By signing below, I agree to comply with this Agreement, and I understand that if I do not follow the Agreements listed above, my provider may stop    _________________________________________  Date/Time 3/17/2022 9:16 AM                 (Patient Signature)    ________________________________________    Date/Time 3/17/2022 9:16 AM   (Parent or Guardian Signature if <18 yrs)    _________________________________________ Date/Time 3/17/2022 9:16 AM   (Provider Signature)

## 2022-03-18 PROBLEM — F90.9 ATTENTION DEFICIT HYPERACTIVITY DISORDER (ADHD): Status: ACTIVE | Noted: 2017-04-10

## 2022-03-19 PROBLEM — G43.009 MIGRAINE WITHOUT AURA AND WITHOUT STATUS MIGRAINOSUS, NOT INTRACTABLE: Status: ACTIVE | Noted: 2018-02-01

## 2022-03-20 PROBLEM — E66.01 OBESITY, MORBID (HCC): Status: ACTIVE | Noted: 2018-02-01

## 2022-04-15 DIAGNOSIS — F90.9 ATTENTION DEFICIT HYPERACTIVITY DISORDER (ADHD), UNSPECIFIED ADHD TYPE: ICD-10-CM

## 2022-04-15 RX ORDER — DEXTROAMPHETAMINE SACCHARATE, AMPHETAMINE ASPARTATE, DEXTROAMPHETAMINE SULFATE AND AMPHETAMINE SULFATE 5; 5; 5; 5 MG/1; MG/1; MG/1; MG/1
20 TABLET ORAL 2 TIMES DAILY
Qty: 60 TABLET | Refills: 0 | Status: SHIPPED | OUTPATIENT
Start: 2022-04-15 | End: 2022-05-16 | Stop reason: SDUPTHER

## 2022-04-15 NOTE — TELEPHONE ENCOUNTER
Patient mychart request for refill. Check . Thanks, Alesia    Last Visit: VV 3/17/22 MD Rafaela Garcia  Next Appointment: None  Previous Refill Encounter(s): 3/17/22 60    Requested Prescriptions     Pending Prescriptions Disp Refills    dextroamphetamine-amphetamine (ADDERALL) 20 mg tablet 60 Tablet 0     Sig: Take 1 Tablet by mouth two (2) times a day for 30 days. Max Daily Amount: 40 mg.

## 2022-05-16 ENCOUNTER — PATIENT MESSAGE (OUTPATIENT)
Dept: FAMILY MEDICINE CLINIC | Age: 32
End: 2022-05-16

## 2022-05-16 DIAGNOSIS — F90.9 ATTENTION DEFICIT HYPERACTIVITY DISORDER (ADHD), UNSPECIFIED ADHD TYPE: ICD-10-CM

## 2022-05-16 RX ORDER — DEXTROAMPHETAMINE SACCHARATE, AMPHETAMINE ASPARTATE, DEXTROAMPHETAMINE SULFATE AND AMPHETAMINE SULFATE 5; 5; 5; 5 MG/1; MG/1; MG/1; MG/1
20 TABLET ORAL 2 TIMES DAILY
Qty: 60 TABLET | Refills: 0 | Status: SHIPPED | OUTPATIENT
Start: 2022-05-16 | End: 2022-06-23 | Stop reason: SDUPTHER

## 2022-05-16 NOTE — TELEPHONE ENCOUNTER
From: Carter Larios  To: Tavo Drummond MD  Sent: 5/16/2022 6:19 AM EDT  Subject: Refill requests    Dr. Levine-    I hope all is well. I was unable to select my Adderall for a prescription requests refill. Please , refill my prescription. Have a wonderful day.     Thank you ,

## 2022-05-16 NOTE — TELEPHONE ENCOUNTER
PCP: Pam Tate MD    Last appt: 3/17/2022  No future appointments. Requested Prescriptions     Pending Prescriptions Disp Refills    dextroamphetamine-amphetamine (ADDERALL) 20 mg tablet 60 Tablet 0     Sig: Take 1 Tablet by mouth two (2) times a day for 30 days.  Max Daily Amount: 40 mg.       Prior labs and Blood pressures:  BP Readings from Last 3 Encounters:   03/11/21 128/81   03/13/20 129/85   11/07/19 135/88     Lab Results   Component Value Date/Time    Sodium 136 02/20/2015 10:15 AM    Potassium 4.2 02/20/2015 10:15 AM    Chloride 98 02/20/2015 10:15 AM    CO2 22 02/20/2015 10:15 AM    Anion gap 12 07/26/2010 11:21 AM    Glucose 96 02/20/2015 10:15 AM    BUN 11 02/20/2015 10:15 AM    Creatinine 0.62 02/20/2015 10:15 AM    BUN/Creatinine ratio 18 02/20/2015 10:15 AM    GFR est  02/20/2015 10:15 AM    GFR est non- 02/20/2015 10:15 AM    Calcium 9.4 02/20/2015 10:15 AM     Lab Results   Component Value Date/Time    Hemoglobin A1c 5.9 (H) 02/20/2015 10:15 AM     No results found for: CHOL, CHOLPOCT, CHOLX, CHLST, CHOLV, HDL, HDLPOC, HDLP, LDL, LDLCPOC, LDLC, DLDLP, VLDLC, VLDL, TGLX, TRIGL, TRIGP, TGLPOCT, CHHD, CHHDX  No results found for: Stephane Lean, VD3RIA    Lab Results   Component Value Date/Time    TSH 3.610 02/20/2015 10:15 AM

## 2022-06-23 ENCOUNTER — VIRTUAL VISIT (OUTPATIENT)
Dept: FAMILY MEDICINE CLINIC | Age: 32
End: 2022-06-23
Payer: COMMERCIAL

## 2022-06-23 DIAGNOSIS — F90.9 ATTENTION DEFICIT HYPERACTIVITY DISORDER (ADHD), UNSPECIFIED ADHD TYPE: Primary | ICD-10-CM

## 2022-06-23 DIAGNOSIS — G43.009 MIGRAINE WITHOUT AURA AND WITHOUT STATUS MIGRAINOSUS, NOT INTRACTABLE: ICD-10-CM

## 2022-06-23 PROCEDURE — 99213 OFFICE O/P EST LOW 20 MIN: CPT | Performed by: FAMILY MEDICINE

## 2022-06-23 RX ORDER — DEXTROAMPHETAMINE SACCHARATE, AMPHETAMINE ASPARTATE, DEXTROAMPHETAMINE SULFATE AND AMPHETAMINE SULFATE 5; 5; 5; 5 MG/1; MG/1; MG/1; MG/1
20 TABLET ORAL 2 TIMES DAILY
Qty: 60 TABLET | Refills: 0 | Status: SHIPPED | OUTPATIENT
Start: 2022-06-23 | End: 2022-07-21 | Stop reason: SDUPTHER

## 2022-06-23 NOTE — PROGRESS NOTES
Benito Lawrence  28 y.o. female  1990  [address] [place] [address]   [id number]     [facility]       Encounter Date: 6/23/2022           Established Patient Visit Note: Tiffanie Trinidad MD    Reason for Appointment:  Chief Complaint   Patient presents with    Follow-up       History of Present Illness:  History provided by patient    Benito Lawrence is a 28 y.o. female who presents today for:     ADHD  Current Meds: Adderall 20mg bid  Deneies any side effects from medication. Denies palpitations, anxiety, problem with appetite. Denies any risk from pregnancy. She reports having IUD in place. Patient reports that she has found the the Adderall is helped with focus and concentration. It also helps with finishing projects. Initial Diagnosis: she was diagnosed by primary care,  and she has been on the [facility] several years. 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 She will graduate in Sept, 2022.         · Migraines: she reports that the [employer] was too expensive, so she did not pick it up. She reports that her migraines have not been as bad recently. Prior meds include topamax (stopped d/t poor appetite) . Migraines are usually precipitated by bad weather.         Cervical Cancer Screening: followed by GYN [facility])  COVID Vaccine: she reports that she has had two COVID vaccines. Review of Systems  All other ROS were reviewed and are negative except as discussed in HPI        Allergies: Codeine    Medications:     Current Outpatient Medications:     dextroamphetamine-amphetamine (ADDERALL) 20 mg tablet, Take 1 Tablet by mouth two (2) times a day.  Max Daily Amount: 40 mg., Disp: 60 Tablet, Rfl: 0    butalbital-acetaminophen-caffeine (FIORICET, ESGIC) -40 mg per tablet, Take 1 Tablet by mouth every four (4) hours as needed for Headache., Disp: 30 Tablet, Rfl: 0    ubrogepant (Ubrelvy) 100 mg tablet, Take one tab PRN migraine. Patient may take a second dose after 2 hours if headache not improved. (Do not take more than 2 tabs in a 24 hours period), Disp: 16 Tablet, Rfl: 2    triamcinolone acetonide (KENALOG) 0.1 % ointment, Apply  to affected area two (2) times a day. use thin layer, Disp: 30 g, Rfl: 0    multivitamin (ONE A DAY) tablet, Take 1 Tab by mouth daily. , Disp: , Rfl:     levonorgestrel (MIRENA) 20 mcg/24 hours (5 yrs) 52 mg IUD, 1 Device by IntraUTERine route once., Disp: , Rfl:     ibuprofen (MOTRIN) 800 mg tablet, Take 1 Tab by mouth every eight (8) hours. , Disp: 30 Tab, Rfl: 1    cyanocobalamin (VITAMIN B-12) 100 mcg tablet, Take 100 mcg by mouth daily. , Disp: , Rfl:     ferrous sulfate (IRON) 325 mg (65 mg iron) tablet, Take  by mouth Daily (before breakfast). , Disp: , Rfl:     History  Patient Care Team:  Gabriela Sweeney MD as PCP - General (Family Medicine)  Gabriela Sweeney MD as PCP - [address] [place] [address] [facility]    Past Medical History: she has a past medical history of , Acne (3/3/2005), ADD (attention deficit disorder) (since 14yo), Allergic rhinitis, JAQUAN (generalized anxiety disorder), Genital herpes, Granuloma faciale (), Hyperhydrosis disorder (), Migraines (), Miscarriage, and S/P tonsillectomy and adenoidectomy (). Past Surgical History: she has a past surgical history that includes hx other surgical (Bilateral, 2000) and hx other surgical (Left, ). Family Medical History: family history includes Cancer in her paternal grandfather; Diabetes in her paternal grandmother; Heart Disease in her father; Hypertension in her mother; Other in her maternal grandmother. Social History: she reports that she quit smoking about 2 years ago. Her smoking use included cigarettes. She has a 12.00 pack-year smoking history.  She has never used smokeless tobacco. She reports that she does not drink alcohol and does not use drugs. Objective:     Physical Exam    Constitutional:       General: Not in acute distress. Appearance: Normal appearance. Not ill-appearing,  Not toxic-appearing. HENT:      Head: Normocephalic. Right Ear: External ear normal.      Left Ear: External ear normal.      Nose: Nose normal.   Eyes:      General: No scleral icterus. Right eye: No discharge. Left eye: No discharge. Extraocular Movements: Extraocular movements intact. Conjunctiva/sclera: Conjunctivae normal.   Neck:      Musculoskeletal: Normal range of motion. Pulmonary:      Effort: Pulmonary effort is normal. No respiratory distress. Neurological:      Mental Status: Mental status is at baseline. Psychiatric:         Mood and Affect: Mood normal.         Behavior: Behavior normal.         Thought Content: Thought content normal.         Judgment: Judgment normal.       Assessment & Plan:      ICD-10-CM ICD-9-CM    1. Attention deficit hyperactivity disorder (ADHD), unspecified ADHD type  F90.9 314.01 dextroamphetamine-amphetamine (ADDERALL) 20 mg tablet   2. Migraine without aura and without status migrainosus, not intractable  G43.009 346.10      All conditions listed above: Chronic, stable and/or managed by specialist. Will continue current treatment regimen. Discussed following up with specialist as scheduled. Discussed recommendations for diet and exercise. I was in the office while conducting this encounter. Consent:  She and/or her healthcare decision maker is aware that this patient-initiated Telehealth encounter is a billable service, with coverage as determined by her insurance carrier. She is aware that she may receive a bill and has provided verbal consent to proceed: Yes    This virtual visit was conducted via [address]  Pursuant to the emergency declaration under the [facility]5 waiver authority and the Coronavirus Preparedness and Response Supplemental Appropriations Act, this Virtual  Visit was conducted to reduce the patient's risk of exposure to COVID-19 and provide continuity of care for an established patient. Services were provided through a video synchronous discussion virtually to substitute for in-person clinic visit. Due to this being a TeleHealth evaluation, many elements of the physical examination are unable to be assessed. Total Time: minutes: 21-30 minutes. I have discussed the diagnosis with the patient and the intended plan as seen in the above orders. The patient has received an after-visit summary along with patient information handout. I have discussed medication side effects and warnings with the patient as well. Disposition  Follow-up and Dispositions    · Return in about 3 months (around 9/23/2022) for follow up of chronic conditions.            Remi Cutler MD

## 2022-07-21 DIAGNOSIS — F90.9 ATTENTION DEFICIT HYPERACTIVITY DISORDER (ADHD), UNSPECIFIED ADHD TYPE: ICD-10-CM

## 2022-07-21 RX ORDER — DEXTROAMPHETAMINE SACCHARATE, AMPHETAMINE ASPARTATE, DEXTROAMPHETAMINE SULFATE AND AMPHETAMINE SULFATE 5; 5; 5; 5 MG/1; MG/1; MG/1; MG/1
20 TABLET ORAL 2 TIMES DAILY
Qty: 60 TABLET | Refills: 0 | Status: SHIPPED | OUTPATIENT
Start: 2022-07-21 | End: 2022-08-17 | Stop reason: SDUPTHER

## 2022-07-21 NOTE — TELEPHONE ENCOUNTER
Chief Complaint   Patient presents with    Medication Refill     ADDERALL     Patient was seen on 06/23/22.

## 2022-08-17 ENCOUNTER — VIRTUAL VISIT (OUTPATIENT)
Dept: FAMILY MEDICINE CLINIC | Age: 32
End: 2022-08-17
Payer: COMMERCIAL

## 2022-08-17 DIAGNOSIS — G43.009 MIGRAINE WITHOUT AURA AND WITHOUT STATUS MIGRAINOSUS, NOT INTRACTABLE: Primary | ICD-10-CM

## 2022-08-17 DIAGNOSIS — F90.9 ATTENTION DEFICIT HYPERACTIVITY DISORDER (ADHD), UNSPECIFIED ADHD TYPE: ICD-10-CM

## 2022-08-17 PROCEDURE — 99213 OFFICE O/P EST LOW 20 MIN: CPT | Performed by: FAMILY MEDICINE

## 2022-08-17 RX ORDER — DEXTROAMPHETAMINE SACCHARATE, AMPHETAMINE ASPARTATE, DEXTROAMPHETAMINE SULFATE AND AMPHETAMINE SULFATE 5; 5; 5; 5 MG/1; MG/1; MG/1; MG/1
20 TABLET ORAL 2 TIMES DAILY
Qty: 60 TABLET | Refills: 0 | Status: SHIPPED | OUTPATIENT
Start: 2022-08-17 | End: 2022-09-16 | Stop reason: SDUPTHER

## 2022-08-17 NOTE — PROGRESS NOTES
Young Capellan  28 y.o. female  1990  [address] [place] [address]   [id number]     [facility]       Encounter Date: 8/17/2022           Established Patient Visit Note: Karlo Arce MD    Reason for Appointment:  Chief Complaint   Patient presents with    Follow-up       History of Present Illness:  History provided by patient    Young Capellan is a 28 y.o. female who presents today for:     ADHD  Current Meds: Adderall 20mg bid  Deneies any side effects from medication. Denies palpitations, anxiety, problem with appetite. Denies any risk from pregnancy. She reports having IUD in place. Patient reports that she has found the the Adderall is helped with focus and concentration. It also helps with finishing projects. Initial Diagnosis: she was diagnosed by primary care,  and she has been on the [facility] for several years. Interval History: she is in school for [employer] (permanent makeup), and the Adderall is helping her to stay focused for this. She will graduate in Sept, 2022. Migraines: she reports as improved with the recent weather being nice outside.   Cervical Cancer Screening: followed by GYN [facility])  COVID Vaccine: she reports that she has had two COVID vaccines. Review of Systems  All other ROS were reviewed and are negative except as discussed in HPI        Allergies: Codeine    Medications:     Current Outpatient Medications:     dextroamphetamine-amphetamine (ADDERALL) 20 mg tablet, Take 1 Tablet by mouth two (2) times a day. Max Daily Amount: 40 mg., Disp: 60 Tablet, Rfl: 0    butalbital-acetaminophen-caffeine (FIORICET, ESGIC) -40 mg per tablet, Take 1 Tablet by mouth every four (4) hours as needed for Headache., Disp: 30 Tablet, Rfl: 0    ubrogepant (Ubrelvy) 100 mg tablet, Take one tab PRN migraine. Patient may take a second dose after 2 hours if headache not improved.  (Do not take more than 2 tabs in a 24 hours period), Disp: 16 Tablet, Rfl: 2    triamcinolone acetonide (KENALOG) 0.1 % ointment, Apply  to affected area two (2) times a day. use thin layer, Disp: 30 g, Rfl: 0    multivitamin (ONE A DAY) tablet, Take 1 Tab by mouth daily. , Disp: , Rfl:     levonorgestrel (MIRENA) 20 mcg/24 hours (5 yrs) 52 mg IUD, 1 Device by IntraUTERine route once., Disp: , Rfl:     ibuprofen (MOTRIN) 800 mg tablet, Take 1 Tab by mouth every eight (8) hours. , Disp: 30 Tab, Rfl: 1    cyanocobalamin (VITAMIN B-12) 100 mcg tablet, Take 100 mcg by mouth daily. , Disp: , Rfl:     ferrous sulfate (IRON) 325 mg (65 mg iron) tablet, Take  by mouth Daily (before breakfast). , Disp: , Rfl:     History  Patient Care Team:  Vera Arellano MD as PCP - General (Family Medicine)  Vera Arellano MD as PCP - [address] [place], [place] [address]led Provider    Past Medical History: she has a past medical history of , Acne (3/3/2005), ADD (attention deficit disorder) (since 12yo), Allergic rhinitis, JAQUAN (generalized anxiety disorder), Genital herpes, Granuloma faciale (), Hyperhydrosis disorder (), Migraines (), Miscarriage, and S/P tonsillectomy and adenoidectomy (). Past Surgical History: she has a past surgical history that includes hx other surgical (Bilateral, 2000) and hx other surgical (Left, ). Family Medical History: family history includes Cancer in her paternal grandfather; Diabetes in her paternal grandmother; Heart Disease in her father; Hypertension in her mother; Other in her maternal grandmother. Social History: she reports that she quit smoking about 2 years ago. Her smoking use included cigarettes. She has a 12.00 pack-year smoking history. She has never used smokeless tobacco. She reports that she does not drink alcohol and does not use drugs. Objective:     Physical Exam    Constitutional:       General: Not in acute distress. Appearance: Normal appearance.   Not ill-appearing, Not toxic-appearing. HENT:      Head: Normocephalic. Right Ear: External ear normal.      Left Ear: External ear normal.      Nose: Nose normal.   Eyes:      General: No scleral icterus. Right eye: No discharge. Left eye: No discharge. Extraocular Movements: Extraocular movements intact. Conjunctiva/sclera: Conjunctivae normal.   Neck:      Musculoskeletal: Normal range of motion. Pulmonary:      Effort: Pulmonary effort is normal. No respiratory distress. Neurological:      Mental Status: Mental status is at baseline. Psychiatric:         Mood and Affect: Mood normal.         Behavior: Behavior normal.         Thought Content: Thought content normal.         Judgment: Judgment normal.       Assessment & Plan:      ICD-10-CM ICD-9-CM    1. Migraine without aura and without status migrainosus, not intractable  G43.009 346.10       2. Attention deficit hyperactivity disorder (ADHD), unspecified ADHD type  F90.9 314.01 dextroamphetamine-amphetamine (ADDERALL) 20 mg tablet        Last PDMP Ricki as Reviewed:  Review User Review Instant Review Result   DIANA Schmitt 8/17/2022  1:26 PM Reviewed PDMP [1]         ADHD: Patient's symptoms improved with on current regimen. Reviewed  with no irregularities. Controlled substances contract on file. Discussed side effects of medication and precautions. Discussed diversion, illicit drug use, and noncompliance with controlled substance contract as grounds for stopping medication. Migraine: Chronic, stable. Continue to monitor. I was in the office while conducting this encounter. Consent:  She and/or her healthcare decision maker is aware that this patient-initiated Telehealth encounter is a billable service, with coverage as determined by her insurance carrier. She is aware that she may receive a bill and has provided verbal consent to proceed: Yes    This virtual visit was conducted via [address]  Pursuant to the emergency declaration under the [facility] waiver authority and the [employer] Act, this Virtual  Visit was conducted to reduce the patient's risk of exposure to COVID-19 and provide continuity of care for an established patient. Services were provided through a video synchronous discussion virtually to substitute for in-person clinic visit. Due to this being a TeleHealth evaluation, many elements of the physical examination are unable to be assessed. Total Time: minutes: 21-30 minutes. I have discussed the diagnosis with the patient and the intended plan as seen in the above orders. The patient has received an after-visit summary along with patient information handout. I have discussed medication side effects and warnings with the patient as well. Disposition  Follow-up and Dispositions    Return in about 3 months (around 11/17/2022).            Jayde Mendez MD

## 2022-09-16 DIAGNOSIS — F90.9 ATTENTION DEFICIT HYPERACTIVITY DISORDER (ADHD), UNSPECIFIED ADHD TYPE: ICD-10-CM

## 2022-09-16 RX ORDER — DEXTROAMPHETAMINE SACCHARATE, AMPHETAMINE ASPARTATE, DEXTROAMPHETAMINE SULFATE AND AMPHETAMINE SULFATE 5; 5; 5; 5 MG/1; MG/1; MG/1; MG/1
20 TABLET ORAL 2 TIMES DAILY
Qty: 60 TABLET | Refills: 0 | Status: SHIPPED | OUTPATIENT
Start: 2022-09-16 | End: 2022-10-23 | Stop reason: SDUPTHER

## 2022-09-16 NOTE — TELEPHONE ENCOUNTER
Chief Complaint   Patient presents with    Medication Refill     ADDERALL     Patient seen on 08/17/22.

## 2022-09-19 ENCOUNTER — TELEPHONE (OUTPATIENT)
Dept: FAMILY MEDICINE CLINIC | Age: 32
End: 2022-09-19

## 2022-09-19 NOTE — TELEPHONE ENCOUNTER
Chief Complaint   Patient presents with    Prior Auth     Amphetamine-Dextroamphetamine 20 mg; PA has been approved. PA has been approved. From 09/19/2022 to 09/18/2025.

## 2022-10-23 DIAGNOSIS — F90.9 ATTENTION DEFICIT HYPERACTIVITY DISORDER (ADHD), UNSPECIFIED ADHD TYPE: ICD-10-CM

## 2022-10-24 RX ORDER — DEXTROAMPHETAMINE SACCHARATE, AMPHETAMINE ASPARTATE, DEXTROAMPHETAMINE SULFATE AND AMPHETAMINE SULFATE 5; 5; 5; 5 MG/1; MG/1; MG/1; MG/1
20 TABLET ORAL 2 TIMES DAILY
Qty: 60 TABLET | Refills: 0 | Status: SHIPPED | OUTPATIENT
Start: 2022-10-24 | End: 2022-12-01 | Stop reason: SDUPTHER

## 2022-10-24 NOTE — TELEPHONE ENCOUNTER
Chief Complaint   Patient presents with    Medication Refill     ADDERALL 20 mg     Patient seen on 08/17/22.

## 2022-11-21 DIAGNOSIS — F90.9 ATTENTION DEFICIT HYPERACTIVITY DISORDER (ADHD), UNSPECIFIED ADHD TYPE: ICD-10-CM

## 2022-11-21 RX ORDER — DEXTROAMPHETAMINE SACCHARATE, AMPHETAMINE ASPARTATE, DEXTROAMPHETAMINE SULFATE AND AMPHETAMINE SULFATE 5; 5; 5; 5 MG/1; MG/1; MG/1; MG/1
20 TABLET ORAL 2 TIMES DAILY
Qty: 60 TABLET | Refills: 0 | OUTPATIENT
Start: 2022-11-21

## 2022-11-22 NOTE — TELEPHONE ENCOUNTER
Spoke to pt on 11/22/22 informed her that her medication was refused by  until she make's an appt. Pt's scheduled to see Christy Grace on 11/29/22 @ 11:00 am.  -VBN 11/22/22

## 2022-12-01 ENCOUNTER — VIRTUAL VISIT (OUTPATIENT)
Dept: FAMILY MEDICINE CLINIC | Age: 32
End: 2022-12-01
Payer: COMMERCIAL

## 2022-12-01 DIAGNOSIS — F90.9 ATTENTION DEFICIT HYPERACTIVITY DISORDER (ADHD), UNSPECIFIED ADHD TYPE: ICD-10-CM

## 2022-12-01 DIAGNOSIS — G43.009 MIGRAINE WITHOUT AURA AND WITHOUT STATUS MIGRAINOSUS, NOT INTRACTABLE: Primary | ICD-10-CM

## 2022-12-01 RX ORDER — DEXTROAMPHETAMINE SACCHARATE, AMPHETAMINE ASPARTATE, DEXTROAMPHETAMINE SULFATE AND AMPHETAMINE SULFATE 5; 5; 5; 5 MG/1; MG/1; MG/1; MG/1
20 TABLET ORAL 2 TIMES DAILY
Qty: 60 TABLET | Refills: 0 | Status: SHIPPED | OUTPATIENT
Start: 2022-12-01

## 2022-12-01 NOTE — PROGRESS NOTES
Fabio Porras  28 y.o. female  1990  [address] [place] [address]   [id number]     [facility]       Encounter Date: 12/1/2022           Established Patient Visit Note: Tracy Jin MD    Reason for Appointment:  Chief Complaint   Patient presents with    Follow-up       History of Present Illness:  History provided by patient    Fabio Porras is a 28 y.o. female who presents today for:       ADHD  Current Meds: Adderall 20mg bid  Deneies any side effects from medication. Denies palpitations, anxiety, problem with appetite. Denies any risk from pregnancy. She reports having IUD in place. Patient reports that she has found the the Adderall is helped with focus and concentration. It also helps with finishing projects as well as attending to work. Initial Diagnosis: she was diagnosed by primary care provider,  and she has been on the [facility] for several years. 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 Adderall is helping her to stay focused for this. Migraines: she reports as here and there. Worse with bad weather. Den Rodrigueztiffanie sent to pharmacy in March, 2022, but she reports that it was too expensive. She reports most headaches improved with tylenol, and those that don't she uses the Fioricet.   Cervical Cancer Screening: followed by GYN [facility])  COVID Vaccine: she reports that she has had two COVID vaccines. She declines booster  Flu Vaccine: she is planning to get flu vaccine           Review of Systems  All other ROS were reviewed and are negative except as discussed in HPI          Allergies: Codeine    Medications:     Current Outpatient Medications:     dextroamphetamine-amphetamine (ADDERALL) 20 mg tablet, Take 1 Tablet by mouth two (2) times a day.  Max Daily Amount: 40 mg., Disp: 60 Tablet, Rfl: 0    butalbital-acetaminophen-caffeine (FIORICET, ESGIC) -40 mg per tablet, Take 1 Tablet by mouth every four (4) hours as needed for Headache., Disp: 30 Tablet, Rfl: 0    triamcinolone acetonide (KENALOG) 0.1 % ointment, Apply  to affected area two (2) times a day. use thin layer, Disp: 30 g, Rfl: 0    multivitamin (ONE A DAY) tablet, Take 1 Tab by mouth daily. , Disp: , Rfl:     levonorgestrel (MIRENA) 20 mcg/24 hours (5 yrs) 52 mg IUD, 1 Device by IntraUTERine route once., Disp: , Rfl:     ibuprofen (MOTRIN) 800 mg tablet, Take 1 Tab by mouth every eight (8) hours. , Disp: 30 Tab, Rfl: 1    cyanocobalamin (VITAMIN B-12) 100 mcg tablet, Take 100 mcg by mouth daily. , Disp: , Rfl:     ferrous sulfate (IRON) 325 mg (65 mg iron) tablet, Take  by mouth Daily (before breakfast). , Disp: , Rfl:     History  Patient Care Team:  Arnie Dakin, MD as PCP - General (Family Medicine)  Arnie Dakin, MD as PCP - [facility] Provider    Past Medical History: she has a past medical history of , Acne (3/3/2005), ADD (attention deficit disorder) (since 14yo), Allergic rhinitis, JAQUAN (generalized anxiety disorder), Genital herpes, Granuloma faciale (), Hyperhydrosis disorder (), Migraines (), Miscarriage, and S/P tonsillectomy and adenoidectomy (). Past Surgical History: she has a past surgical history that includes hx other surgical (Bilateral, 2000) and hx other surgical (Left, ). Family Medical History: family history includes Cancer in her paternal grandfather; Diabetes in her paternal grandmother; Heart Disease in her father; Hypertension in her mother; Other in her maternal grandmother. Social History: she reports that she quit smoking about 3 years ago. Her smoking use included cigarettes. She has a 12.00 pack-year smoking history. She has never used smokeless tobacco. She reports that she does not drink alcohol and does not use drugs.       Objective:     Physical Exam    Constitutional:       General: Not in acute distress. Appearance: Normal appearance. Not ill-appearing,  Not toxic-appearing. HENT:      Head: Normocephalic. Right Ear: External ear normal.      Left Ear: External ear normal.      Nose: Nose normal.   Eyes:      General: No scleral icterus. Right eye: No discharge. Left eye: No discharge. Extraocular Movements: Extraocular movements intact. Conjunctiva/sclera: Conjunctivae normal.   Neck:      Musculoskeletal: Normal range of motion. Pulmonary:      Effort: Pulmonary effort is normal. No respiratory distress. Neurological:      Mental Status: Mental status is at baseline. Psychiatric:         Mood and Affect: Mood normal.         Behavior: Behavior normal.         Thought Content: Thought content normal.         Judgment: Judgment normal.       Assessment & Plan:      ICD-10-CM ICD-9-CM    1. Migraine without aura and without status migrainosus, not intractable  G43.009 346.10       2. Attention deficit hyperactivity disorder (ADHD), unspecified ADHD type  F90.9 314.01 dextroamphetamine-amphetamine (ADDERALL) 20 mg tablet        ADHD: Chronic, stable. Patient's symptoms improved with on current regimen. . Reviewed  with no irregularities. Controlled substances contract on file. Discussed side effects of medication and precautions. Discussed diversion, illicit drug use, and noncompliance with controlled substance contract as grounds for stopping medication. Migraines: Chronic, stable. Continue current therapy. I was in the office while conducting this encounter. Consent:  She and/or her healthcare decision maker is aware that this patient-initiated Telehealth encounter is a billable service, with coverage as determined by her insurance carrier. She is aware that she may receive a bill and has provided verbal consent to proceed: Yes    This virtual visit was conducted via [address]  Pursuant to the emergency declaration under the [address] and the National Emergencies Act, [facility] authority and the [employer] Act, this Virtual  Visit was conducted to reduce the patient's risk of exposure to COVID-19 and provide continuity of care for an established patient. Services were provided through a video synchronous discussion virtually to substitute for in-person clinic visit. Due to this being a TeleHealth evaluation, many elements of the physical examination are unable to be assessed. Total Time: minutes: 21-30 minutes. I have discussed the diagnosis with the patient and the intended plan as seen in the above orders. The patient has received an after-visit summary along with patient information handout. I have discussed medication side effects and warnings with the patient as well. Disposition  Follow-up and Dispositions    Return in about 3 months (around 3/1/2023).            Russ Ballesteros MD

## 2023-01-03 DIAGNOSIS — F90.9 ATTENTION DEFICIT HYPERACTIVITY DISORDER (ADHD), UNSPECIFIED ADHD TYPE: ICD-10-CM

## 2023-01-03 RX ORDER — DEXTROAMPHETAMINE SACCHARATE, AMPHETAMINE ASPARTATE, DEXTROAMPHETAMINE SULFATE AND AMPHETAMINE SULFATE 5; 5; 5; 5 MG/1; MG/1; MG/1; MG/1
20 TABLET ORAL 2 TIMES DAILY
Qty: 60 TABLET | Refills: 0 | Status: SHIPPED | OUTPATIENT
Start: 2023-01-03

## 2023-01-03 NOTE — TELEPHONE ENCOUNTER
Chief Complaint   Patient presents with    Medication Refill     ADDERALL     Patient seen on 12/01/22.

## 2023-02-05 DIAGNOSIS — F90.9 ATTENTION DEFICIT HYPERACTIVITY DISORDER (ADHD), UNSPECIFIED ADHD TYPE: ICD-10-CM

## 2023-02-06 RX ORDER — DEXTROAMPHETAMINE SACCHARATE, AMPHETAMINE ASPARTATE, DEXTROAMPHETAMINE SULFATE AND AMPHETAMINE SULFATE 5; 5; 5; 5 MG/1; MG/1; MG/1; MG/1
20 TABLET ORAL 2 TIMES DAILY
Qty: 60 TABLET | Refills: 0 | Status: SHIPPED | OUTPATIENT
Start: 2023-02-06

## 2023-02-06 NOTE — TELEPHONE ENCOUNTER
Patient mychart request for refill adderall. Check . Thanks, Alesia    Last Visit: VV 12/1/22 MD Armond Redd  Next Appointment: None  Previous Refill Encounter(s): 1/3/23 60    Requested Prescriptions     Pending Prescriptions Disp Refills    dextroamphetamine-amphetamine (ADDERALL) 20 mg tablet 60 Tablet 0     Sig: Take 1 Tablet by mouth two (2) times a day. Max Daily Amount: 40 mg. For Pharmacy Admin Tracking Only    Program: Medication Refill  CPA in place:   Recommendation Provided To:    Intervention Detail: New Rx: 1, reason: Patient Preference  Intervention Accepted By:   Sherley Turner Closed?:   Time Spent (min): 5

## 2023-03-20 ENCOUNTER — OFFICE VISIT (OUTPATIENT)
Dept: FAMILY MEDICINE CLINIC | Age: 33
End: 2023-03-20
Payer: COMMERCIAL

## 2023-03-20 VITALS
OXYGEN SATURATION: 100 % | TEMPERATURE: 98.8 F | RESPIRATION RATE: 16 BRPM | SYSTOLIC BLOOD PRESSURE: 123 MMHG | HEIGHT: 64 IN | DIASTOLIC BLOOD PRESSURE: 80 MMHG | HEART RATE: 84 BPM | BODY MASS INDEX: 46.64 KG/M2 | WEIGHT: 273.2 LBS

## 2023-03-20 DIAGNOSIS — G43.009 MIGRAINE WITHOUT AURA AND WITHOUT STATUS MIGRAINOSUS, NOT INTRACTABLE: ICD-10-CM

## 2023-03-20 DIAGNOSIS — F90.9 ATTENTION DEFICIT HYPERACTIVITY DISORDER (ADHD), UNSPECIFIED ADHD TYPE: Primary | ICD-10-CM

## 2023-03-20 DIAGNOSIS — Z79.899 ENCOUNTER FOR MEDICATION MANAGEMENT: ICD-10-CM

## 2023-03-20 PROCEDURE — 99213 OFFICE O/P EST LOW 20 MIN: CPT | Performed by: NURSE PRACTITIONER

## 2023-03-20 RX ORDER — DEXTROAMPHETAMINE SACCHARATE, AMPHETAMINE ASPARTATE, DEXTROAMPHETAMINE SULFATE AND AMPHETAMINE SULFATE 5; 5; 5; 5 MG/1; MG/1; MG/1; MG/1
20 TABLET ORAL 2 TIMES DAILY
Qty: 60 TABLET | Refills: 0 | Status: SHIPPED | OUTPATIENT
Start: 2023-03-20

## 2023-03-20 NOTE — PROGRESS NOTES
HISTORY OF PRESENT ILLNESS  Nikolay Sims is a 28 y.o. female. [facility], former patient of Dr. Anitha Mc. Follow up health problems. ADHD. Taking prescribed adderall. 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 Works varying hours, sometimes on weekends. Medication helps with focus, organization and staying on task. She has been out of medication for the past 3 weeks. Admits to using CBD gummies at bedtime for sleep and to unwind. Migraine. Headache frequency has improved. Averages about 1 monthly. Uses fioricet with good effect. Patient Active Problem List   Diagnosis Code    JAQUAN (generalized anxiety disorder) F41.1    Attention deficit hyperactivity disorder (ADHD) F90.9    Migraine without aura and without status migrainosus, not intractable G43.009    Obesity, morbid (HCC) E66.01     Current Outpatient Medications   Medication Sig    dextroamphetamine-amphetamine (ADDERALL) 20 mg tablet Take 1 Tablet by mouth two (2) times a day. Max Daily Amount: 40 mg.    butalbital-acetaminophen-caffeine (FIORICET, ESGIC) -40 mg per tablet Take 1 Tablet by mouth every four (4) hours as needed for Headache.    multivitamin (ONE A DAY) tablet Take 1 Tab by mouth daily. levonorgestrel (MIRENA) 20 mcg/24 hours (5 yrs) 52 mg IUD 1 Device by IntraUTERine route once. cyanocobalamin (VITAMIN B12) 100 mcg tablet Take 100 mcg by mouth daily. ferrous sulfate 325 mg (65 mg iron) tablet Take  by mouth Daily (before breakfast). No current facility-administered medications for this visit. Social History     Tobacco Use    Smoking status: Former     Packs/day: 1.00     Years: 12.00     Pack years: 12.00     Types: Cigarettes     Quit date: 11/11/2019     Years since quitting: 3.3    Smokeless tobacco: Never    Tobacco comments:     vapes occasionally   Vaping Use    Vaping Use: Some days    Devices: Disposable   Substance Use Topics    Alcohol use: No    Drug use:  No Blood pressure 123/80, pulse 84, temperature 98.8 °F (37.1 °C), temperature source Temporal, resp. rate 16, height 5' 4\" (1.626 m), weight 273 lb 3.2 oz (123.9 kg), SpO2 100 %. Review of Systems   Respiratory: Negative. Cardiovascular:  Negative for chest pain and palpitations. Neurological:  Positive for headaches. Negative for sensory change and focal weakness. Psychiatric/Behavioral:  Negative for depression and suicidal ideas. The patient is not nervous/anxious. All other systems reviewed and are negative. Physical Exam  Constitutional:       General: She is not in acute distress. Cardiovascular:      Rate and Rhythm: Normal rate and regular rhythm. Heart sounds: Normal heart sounds. Pulmonary:      Effort: Pulmonary effort is normal.      Breath sounds: Normal breath sounds. Musculoskeletal:      Cervical back: Neck supple. Right lower leg: No edema. Left lower leg: No edema. Lymphadenopathy:      Cervical: No cervical adenopathy. Skin:     General: Skin is warm and dry. Psychiatric:         Mood and Affect: Mood normal.         Behavior: Behavior normal.       ASSESSMENT and PLAN  Diagnoses and all orders for this visit:    1. Attention deficit hyperactivity disorder (ADHD), unspecified ADHD type  -     10-DRUG SCREEN, URINE W RFLX CONFIRMATION; Future  -     dextroamphetamine-amphetamine (ADDERALL) 20 mg tablet; Take 1 Tablet by mouth two (2) times a day. Max Daily Amount: 40 mg. Stable on adderall. Patient informed of office policy for controlled substance management including  review and periodic urine drug screen. She is in agreement with policy, contract signed and scanned. PDMP reviewed and appropriate. 2. Encounter for medication management  -     10-DRUG SCREEN, URINE W RFLX CONFIRMATION; Future  Recommend limit use of cannabinoid while taking adderall.   3. Migraine without aura and without status migrainosus, not intractable  Stable on prn fioricet. Follow up 4 months.

## 2023-03-20 NOTE — PROGRESS NOTES
Chief Complaint   Patient presents with    Medication Refill         1. \"Have you been to the ER, urgent care clinic since your last visit? Hospitalized since your last visit? \" No    2. \"Have you seen or consulted any other health care providers outside of the [address] [place] [address] since your last visit? \" No     3. For patients aged 39-70: Has the patient had a colonoscopy / FIT/ Cologuard? NA - based on age      If the patient is female:    4. For patients aged 41-77: Has the patient had a mammogram within the past 2 years? NA - based on age or sex      11. For patients aged 21-65: Has the patient had a pap smear?  Yes - no Care Gap present         3 most recent PHQ Screens 3/20/2023   Little interest or pleasure in doing things Not at all   Feeling down, depressed, irritable, or hopeless Not at all   Total Score PHQ 2 0       Health Maintenance Due   Topic Date Due    COVID-19 Vaccine (1) Never done    Cervical cancer screen  09/26/2021    Flu Vaccine (1) 08/01/2022    Depression Screen  03/17/2023

## 2023-03-20 NOTE — LETTER
Tabatha Trejo  FIOR:[birth year]   MR #:[id number]   [address]   Page 1 of 5        CONTROLLED SUBSTANCE AGREEMENT     I may be prescribed medications that are controlled substances as part  of my treatment plan for management of my medical condition(s). The goal of my treatment plan is to maintain and/or improve my health and wellbeing. Because controlled substances have an increased risk of abuse or harm, continual re-evaluation is needed determine if the goals of my treatment plan are being met for my safety and the safety of others. Dina Dalal  am entering into this Controlled Substance Agreement with my provider, __________________________________ at [facility] . I understand that successful treatment requires mutual trust and honesty between me and my provider. I understand that there are state and federal laws and regulations which apply to the medications that my provider may prescribe that must be followed. I understand there are risks and benefits ts of taking the medicines that my provider may prescribe. I understand and agree that following this Agreement is necessary in continuing my provider-patient relationship and success of my treatment plan. As a part of my treatment plan, I agree to the following:    COMMUNICATION:    1. I will communicate fully with my provider about my medical condition(s), including the effect on my daily life and how well my medications are helping. I will tell my provider all of the medications that I take for any reason, including medications I receive from another health care provider, and will notify my provider about all issues, problems or concerns, including any side effects, which may be related to my medications. I understand that this information allows my provider to adjust my treatment plan to help manage my medical condition.  I understand that this information will become part of my permanent medical record. 2. I will notify my provider if I have a history of alcohol/drug misuse/addiction or if I have had treatment for alcohol/drug addiction in the past, or if I have a new problem with or concern about alcohol/drug use/addiction, because this increases the likelihood of high risk behaviors and may lead to serious medical conditions. 3. Females Only: I will notify my provider if I am or become pregnant, or if I intend to become pregnant, or if I intend to breastfeed. I understand that communication of these issues with my provider is important, due to possible effects my medication could have on an unborn fetus or breastfeeding child. Initials_____      Name:Joanie Leo   OPI:[birth year]   MR #:[id number]   Office:[facility]   Page 2 of 5       MISUSE OF MEDICATIONS / DRUGS:    1. I agree to take all controlled substances as prescribed, and will not misuse or abuse any controlled substances prescribed by my provider. For my safety, I will not increase the amount of medicine I take without first talking with and getting permission from my provider. 2. If I have a medical emergency, another health care provider may prescribe me medication. If I seek emergency treatment, I will notify my provider within seventy-two (72) hours. 3. I understand that my provider may discuss my use and/or possible misuse/abuse of controlled substances and alcohol, as appropriate, with any health care provider involved in my care, pharmacist or legal authority. ILLEGAL DRUGS:    1. I will not use illegal drugs of any kind, including but not limited to marijuana, heroin, cocaine, or any prescription drug which is not prescribed to me. DRUG DIVERSION / PRESCRIPTION FRAUD:    1. I will not share, sell, trade, give away, or otherwise misuse my prescriptions or medications. 2. I will not alter any prescriptions provided to me by my provider.     SINGLE PROVIDER:    1. I agree that all controlled substances that I take will be prescribed only by my provider (or his/her covering provider) under this Agreement. This agreement does not prevent me from seeking emergency medical treatment or receiving pain management related to a surgery. PROTECTING MEDICATIONS:    1. I am responsible for keeping my prescriptions and medications in a safe and secure place including safeguarding them from loss or theft. I understand that lost, stolen or damaged/destroyed prescriptions or medications will not be replaced. Initials____          Name:Joanie Solitario   [facility]:[birth year]   MR #:[id number]   [address]   Page 3 of 5   PRESCRIPTION RENEWALS/REFILLS:    1. I will follow my controlled substance medication schedule as prescribed by my provider. 2. I understand and agree that I will make any requests for renewals or refills of my prescriptions only at the time of an office visit or during my providers regular office hours subject to the prescription refill requirements of the individual practice. 3. I understand that my provider may not call in prescriptions for controlled substances to my pharmacy. 4. I understand that my provider may adjust or discontinue these medications as deemed appropriate for my medical treatment plan. This Agreement does not guarantee the prescription of controlled medications. 5. I agree that if my medications are adjusted or discontinued, I will properly dispose of any remaining medications. I understand that I will be required to dispose of any remaining controlled medications prior to being provided with any prescriptions for other controlled medications. 6. I understand that the renewal of my prescription depends on my medical condition, my consistent participation, and my adherence with my treatment plan and this Agreement.     7. I understand that if I do not keep an appointment with my provider, I may not receive a renewal or refill for my controlled substance medication. PRESCRIPTION MONITORING / DRUG TESTIN. I understand that my provider may require me to provide urine, saliva or blood for testing at any time. I understand that this testing will be used to monitor for safety and adherence with my treatment plan and this Agreement. 2. I understand that my provider may ask me to provide an observed urine specimen, which means that a nurse or other health care provider may watch me provide urine, and I agree to cooperate if I am asked to provide an observed specimen. 3. I understand that if I do not provide urine, saliva or blood samples within two (2) hours of my providers request, or other timeframe decided by my provider, my treatment plan could be changed, or my prescriptions and medications may be changed or ended. 4. I understand that urine, saliva and blood test results will be a part of my permanent medical record. Initials_____        Name:Joanie Ashley   HLT:[birth year]   MR #:[id number]   [address]   Page 4 of 5    5. I understand that my provider is required to obtain a copy of my State Prescription Monitoring Program () Report at any time in order to safely prescribe medications. 6. I will bring all of my prescribed controlled substance medications in their original bottles to all of my scheduled appointments. 7. I understand that my provider may ask me to come to the practice with all of my prescribed medications for a random pill count at any time. I agree to cooperate if I am asked to come in for a random pill count. I understand that if I do not arrive in the timeframe decided by my provider, my treatment plan could be changed, or my prescriptions and medications may be changed or ended.     COOPERATION WITH INVESTIGATIONS:    1. I authorize my provider and my pharmacy to cooperate fully with any local, state, or federal law enforcement agency in the investigation of any possible misuse, sale, or other diversion of my controlled substance prescriptions or medications. RISKS:    1. I understand that my level of consciousness may be affected from the use of controlled substances, and I understand that there are risks, benefits, effects and potential alternatives (including no treatment) to the medications that my provider has prescribed. 2. I understand that I may become drowsy, tired, dizzy, constipated, and sick to my stomach, or have changes in my mood or in my sleep while taking my medications. I have talked with my provider about these possible side effects, risks, benefits, and alternative treatments, and my provider has answered all of my questions. 3. I understand that I should not suddenly stop taking my medications without first speaking with my provider. I understand that if I suddenly stop taking my medications, I may experience nausea, vomiting, sweating,anxiety, sleeplessness, itching or other uncomfortable feelings. 4. I will not take my medications with alcohol of any kind, including beer, wine or liquor. I understand that drinking alcohol with my medications increases the chances of side effects, including breathing problems or even death. 5. I understand that if I have a history of alcoholism or other drug addiction I may be at increased risk of addiction to my medications. Signs of addiction might include craving, compulsive use, and continued use despite harm. Since addiction is a disease, I understand my provider may decide to change my medications and refer me to appropriate treatment services. I understand that this information would become part of my permanent medical record. Initials_____        Name:Joanie Christy   [facility]:[birth year]   MR #:[id number]   [address]   Page 5 of 5       6.  Females only: Children born to women who regularly take controlled substances are likely to have physical problems and suffer withdrawal symptoms at birth. If I am of child-bearing age, I understand that I should use safe and effective birth control while taking any controlled substances to avoid the impact of medications on an unborn fetus or  child. I agree to notify my provider immediately if I should become pregnant so that my treatment plan can be adjusted. 7. Males only: I understand that chronic use of controlled substances has been associated with low testosterone levels in males which may affect my mood, stamina, sexual desire, and general health. I understand that my provider may order the appropriate laboratory test to determine my testosterone level,and I agree to this testing. ADHERENCE:    1. I understand that if I do not adhere to this Agreement in any way, my provider may change my prescriptions, stop prescribing controlled substances or end our provider-patient relationship. 2. If my provider decides to stop prescribing medication, or decides to end our provider-patient relationship,my provider may require that I taper my medications slowly. If necessary, my provider may also provide a prescription for other medications to treat my withdrawal symptoms. UNDERSTANDING THIS AGREEMENT:    I understand that my provider may adjust or stop my prescriptions for controlled substances based on my medical condition and my treatment plan. I understand that this Agreement does not guarantee that I will be prescribed medications or controlled substances. I understand that controlled substances may be just one part  of my treatment plan. My initial on each page and my signature below shows that I have read each page of this Agreement, I have had an opportunity to ask questions, and all of my questions have been answered to my satisfaction by my provider.     By signing below, I agree to comply with this Agreement, and I understand that if I do not follow the Agreements listed above, my provider may stop    _________________________________________  Date/Time 3/20/2023 10:51 AM                 (Patient Signature)    ________________________________________    Date/Time 3/20/2023 10:51 AM   (Parent or Guardian Signature if <18 yrs)    _________________________________________ Date/Time 3/20/2023 10:51 AM   (Provider Signature)

## 2023-03-22 LAB
AMPHETAMINES UR QL SCN: NEGATIVE NG/ML
BARBITURATES UR QL SCN: NEGATIVE NG/ML
BZE UR QL SCN: NEGATIVE NG/ML
CREAT UR-MCNC: 170.1 MG/DL (ref 20–300)
LABORATORY COMMENT REPORT: NORMAL
METHADONE UR QL SCN: NEGATIVE NG/ML
OPIATES UR QL SCN: NEGATIVE NG/ML
OXYCODONE+OXYMORPHONE UR QL SCN: NEGATIVE NG/ML
PCP UR QL: NEGATIVE NG/ML
PH UR: 5.7 (ref 4.5–8.9)
PROPOXYPH UR QL SCN: NEGATIVE NG/ML
SP GR UR: 1.03

## 2023-03-28 LAB
ALPRAZ UR QL: NEGATIVE
AMPHETAMINES UR QL SCN: NEGATIVE NG/ML
BARBITURATES UR QL SCN: NEGATIVE NG/ML
BENZODIAZ UR QL: NEGATIVE NG/ML
BZE UR QL SCN: NEGATIVE NG/ML
CANNABINOID, 737736: POSITIVE
CANNABINOIDS UR QL SCN: NORMAL NG/ML
CARBOXY THC (GC/MS), 737737: 203 NG/ML
CLONAZEPAM UR QL: NEGATIVE
CREAT UR-MCNC: 170.1 MG/DL (ref 20–300)
FLURAZEPAM UR QL: NEGATIVE
LABORATORY COMMENT REPORT: NORMAL
LORAZEPAM UR QL: NEGATIVE
METHADONE UR QL SCN: NEGATIVE NG/ML
MIDAZOLAM UR QL CFM: NEGATIVE
NORDIAZEPAM UR QL: NEGATIVE
OPIATES UR QL SCN: NEGATIVE NG/ML
OXAZEPAM UR QL: NEGATIVE
OXYCODONE+OXYMORPHONE UR QL SCN: NEGATIVE NG/ML
PCP UR QL: NEGATIVE NG/ML
PH UR: 5.7 (ref 4.5–8.9)
PROPOXYPH UR QL SCN: NEGATIVE NG/ML
SP GR UR: 1.03
TEMAZEPAM UR QL CFM: NEGATIVE
TRIAZOLAM UR QL: NEGATIVE

## 2023-04-19 DIAGNOSIS — F90.9 ATTENTION DEFICIT HYPERACTIVITY DISORDER (ADHD), UNSPECIFIED ADHD TYPE: ICD-10-CM

## 2023-04-20 NOTE — TELEPHONE ENCOUNTER
Branden request for refill Adderall. Check . Thanks, Neelam Friedman    Last Visit: 3/20/23 [place]  Next Appointment: None  Previous Refill Encounter(s): 3/20/23 60    Requested Prescriptions     Pending Prescriptions Disp Refills    dextroamphetamine-amphetamine (ADDERALL) 20 mg tablet 60 Tablet 0     Sig: Take 1 Tablet by mouth two (2) times a day. Max Daily Amount: 40 mg.      For Pharmacy Admin Tracking Only    Program: Medication Refill  Intervention Detail: New Rx: 1, reason: Patient Preference  Time Spent (min): 5

## 2023-04-21 RX ORDER — DEXTROAMPHETAMINE SACCHARATE, AMPHETAMINE ASPARTATE, DEXTROAMPHETAMINE SULFATE AND AMPHETAMINE SULFATE 5; 5; 5; 5 MG/1; MG/1; MG/1; MG/1
20 TABLET ORAL 2 TIMES DAILY
Qty: 60 TABLET | Refills: 0 | Status: SHIPPED | OUTPATIENT
Start: 2023-04-21

## 2023-04-21 NOTE — TELEPHONE ENCOUNTER
reviewed. No problems noted. Last filled 3-20-23. UDS up to date and CSA on file. Chart indicates patient has an IUD for contraception.

## 2023-05-22 ENCOUNTER — PATIENT MESSAGE (OUTPATIENT)
Age: 33
End: 2023-05-22

## 2023-05-22 DIAGNOSIS — F90.2 ATTENTION DEFICIT HYPERACTIVITY DISORDER (ADHD), COMBINED TYPE: Primary | ICD-10-CM

## 2023-05-22 RX ORDER — DEXTROAMPHETAMINE SACCHARATE, AMPHETAMINE ASPARTATE, DEXTROAMPHETAMINE SULFATE AND AMPHETAMINE SULFATE 5; 5; 5; 5 MG/1; MG/1; MG/1; MG/1
20 TABLET ORAL 2 TIMES DAILY
Qty: 60 TABLET | Refills: 0 | Status: SHIPPED | OUTPATIENT
Start: 2023-05-22 | End: 2023-06-21

## 2023-05-22 NOTE — TELEPHONE ENCOUNTER
From: Arun Martel  To: Paula Doyle  Sent: 5/22/2023 3:40 PM EDT  Subject: Medicine Refill     Hi,       I need a refill on my Adderall. What do I need to do to get my medicine?      Thank You ,  [facility]

## 2023-06-21 DIAGNOSIS — F90.2 ATTENTION DEFICIT HYPERACTIVITY DISORDER (ADHD), COMBINED TYPE: ICD-10-CM

## 2023-06-21 RX ORDER — DEXTROAMPHETAMINE SACCHARATE, AMPHETAMINE ASPARTATE, DEXTROAMPHETAMINE SULFATE AND AMPHETAMINE SULFATE 5; 5; 5; 5 MG/1; MG/1; MG/1; MG/1
20 TABLET ORAL 2 TIMES DAILY
Qty: 60 TABLET | Refills: 0 | Status: SHIPPED | OUTPATIENT
Start: 2023-06-21 | End: 2023-07-20 | Stop reason: SDUPTHER

## 2023-06-21 NOTE — TELEPHONE ENCOUNTER
PCP: Alejandra Horvath MD    Last appt: [unfilled]  No future appointments. Requested Prescriptions     Pending Prescriptions Disp Refills    amphetamine-dextroamphetamine (ADDERALL) 20 MG tablet 60 tablet 0     Sig: Take 1 tablet by mouth 2 times daily for 30 days.  Max Daily Amount: 40 mg

## 2023-06-26 NOTE — LETTER
NOTIFICATION RETURN TO WORK / SCHOOL 
 
3/11/2021 4:03 PM 
 
Ms. Evie Dial [address] [facility] [address] To Whom It May Concern: 
 
Evie Dial is currently under the care of [facility]. She is cleared to return to work/school on: 3/12/21. If there are questions or concerns please have the patient contact our office.  
 
 
 
Sincerely, 
 
 
Javi Mane NP  
 
 Wound care evaluation

## 2023-07-20 DIAGNOSIS — F90.2 ATTENTION DEFICIT HYPERACTIVITY DISORDER (ADHD), COMBINED TYPE: ICD-10-CM

## 2023-07-20 RX ORDER — DEXTROAMPHETAMINE SACCHARATE, AMPHETAMINE ASPARTATE, DEXTROAMPHETAMINE SULFATE AND AMPHETAMINE SULFATE 5; 5; 5; 5 MG/1; MG/1; MG/1; MG/1
20 TABLET ORAL 2 TIMES DAILY
Qty: 60 TABLET | Refills: 0 | Status: SHIPPED | OUTPATIENT
Start: 2023-07-20 | End: 2023-08-29 | Stop reason: SDUPTHER

## 2023-08-24 DIAGNOSIS — F90.2 ATTENTION DEFICIT HYPERACTIVITY DISORDER (ADHD), COMBINED TYPE: ICD-10-CM

## 2023-08-24 RX ORDER — DEXTROAMPHETAMINE SACCHARATE, AMPHETAMINE ASPARTATE, DEXTROAMPHETAMINE SULFATE AND AMPHETAMINE SULFATE 5; 5; 5; 5 MG/1; MG/1; MG/1; MG/1
20 TABLET ORAL 2 TIMES DAILY
Qty: 60 TABLET | Refills: 0 | Status: CANCELLED | OUTPATIENT
Start: 2023-08-24 | End: 2023-09-23

## 2023-08-28 DIAGNOSIS — F90.2 ATTENTION DEFICIT HYPERACTIVITY DISORDER (ADHD), COMBINED TYPE: ICD-10-CM

## 2023-08-28 RX ORDER — DEXTROAMPHETAMINE SACCHARATE, AMPHETAMINE ASPARTATE, DEXTROAMPHETAMINE SULFATE AND AMPHETAMINE SULFATE 5; 5; 5; 5 MG/1; MG/1; MG/1; MG/1
20 TABLET ORAL 2 TIMES DAILY
Qty: 60 TABLET | Refills: 0 | OUTPATIENT
Start: 2023-08-28 | End: 2023-09-27

## 2023-08-28 NOTE — TELEPHONE ENCOUNTER
NP Yanira,    Patient call stating requested last week refill of adderall and has not heard back. Noted patient is due OV and request had been sent to front office to contact patient to schedule. Contacted patient to advise to contact the office to schedule asap for the refill. Patient stated will call to schedule. Check . Jose Martin Mark    Last appointment: 3/20/23 KATHY Doyle  Next appointment: None- Patient to call to schedule  Previous refill encounter(s): 7/20/23 60    Requested Prescriptions     Pending Prescriptions Disp Refills    amphetamine-dextroamphetamine (ADDERALL) 20 MG tablet 60 tablet 0     Sig: Take 1 tablet by mouth 2 times daily for 30 days. Appointment due. Max Daily Amount: 40 mg     For Pharmacy Admin Tracking Only    Program: Medication Refill  CPA in place:    Recommendation Provided To:    Intervention Detail: New Rx: 1, reason: Patient Preference  Intervention Accepted By:   Dax Ellington Closed?:    Time Spent (min): 10

## 2023-08-28 NOTE — TELEPHONE ENCOUNTER
----- Message from Saroj Meaghan sent at 8/28/2023  4:15 PM EDT -----  Subject: Message to Provider    QUESTIONS  Information for Provider? Patient has been transferred to [facility]   and was seen on 3/20/23 but the PCP still says Heriberto so I am not   able to schedule for a medication f/u. Patient needs an appointment  ---------------------------------------------------------------------------  --------------  [address]  [phone]; OK to leave message on voicemail  ---------------------------------------------------------------------------  --------------  SCRIPT ANSWERS  Relationship to Patient?  Self

## 2023-08-28 NOTE — TELEPHONE ENCOUNTER
Spoke to pt and scheduled follow up with NP [facility] 9/7/23. Pt then stated she will be out of her Adderall before then and would need refill until appt. Pt [facility] [address] [facility] [phone] - F [phone]. -TM 8/28/23

## 2023-08-29 RX ORDER — DEXTROAMPHETAMINE SACCHARATE, AMPHETAMINE ASPARTATE, DEXTROAMPHETAMINE SULFATE AND AMPHETAMINE SULFATE 5; 5; 5; 5 MG/1; MG/1; MG/1; MG/1
20 TABLET ORAL 2 TIMES DAILY
Qty: 60 TABLET | Refills: 0 | Status: SHIPPED | OUTPATIENT
Start: 2023-08-29 | End: 2023-09-28

## 2023-08-29 NOTE — TELEPHONE ENCOUNTER
Spoke to pt and scheduled follow up with NP [facility] 9/7/23. Pt then stated she will be out of her Adderall before then and would need refill until appt. Pt would like prescription sent to [facility]/PHARMACY [address] [phone] - F [phone]. -TM 8/28/23          PCP: Antonino Boles MD    Last appt: 3/20/2023     Future Appointments   Date Time Provider [address]   9/7/2023  2:40 PM Caridad Doyle, APRN - NP [facility]       Requested Prescriptions     Pending Prescriptions Disp Refills    amphetamine-dextroamphetamine (ADDERALL) 20 MG tablet 60 tablet 0     Sig: Take 1 tablet by mouth 2 times daily for 30 days. Appointment due. Max Daily Amount: 40 mg     Refused Prescriptions Disp Refills    amphetamine-dextroamphetamine (ADDERALL) 20 MG tablet 60 tablet 0     Sig: Take 1 tablet by mouth 2 times daily for 30 days. Appointment due.  Max Daily Amount: 40 mg     Refused By: Guillermo Goodman     Reason for Refusal: Patient needs an appointment       Prior labs and Blood pressures:  BP Readings from Last 3 Encounters:   03/20/23 123/80   03/11/21 128/81     No results found for: NA, K, CL, CO2, AGAP, GLU, BUN, CREA, GFRAA, CA, GFRAA  No results found for: HBA1C, [id number]  No results found for: CHOL, CHOLPOCT, CHOLX, CHLST, CHOLV, HDL, HDLPOC, HDLC, LDL, LDLC, VLDLC, VLDL, TGLX, TRIGL  No results found for: VITD3, VD3RIA    No results found for: TSH, TSH2, TSH3

## 2023-09-07 ENCOUNTER — OFFICE VISIT (OUTPATIENT)
Age: 33
End: 2023-09-07
Payer: COMMERCIAL

## 2023-09-07 VITALS
SYSTOLIC BLOOD PRESSURE: 138 MMHG | BODY MASS INDEX: 45.79 KG/M2 | OXYGEN SATURATION: 87 % | DIASTOLIC BLOOD PRESSURE: 82 MMHG | RESPIRATION RATE: 16 BRPM | TEMPERATURE: 97.6 F | WEIGHT: 268.2 LBS | HEIGHT: 64 IN | HEART RATE: 91 BPM

## 2023-09-07 DIAGNOSIS — F90.9 ATTENTION DEFICIT HYPERACTIVITY DISORDER (ADHD), UNSPECIFIED ADHD TYPE: Primary | ICD-10-CM

## 2023-09-07 DIAGNOSIS — J20.9 ACUTE BRONCHITIS, UNSPECIFIED ORGANISM: ICD-10-CM

## 2023-09-07 PROCEDURE — 99213 OFFICE O/P EST LOW 20 MIN: CPT | Performed by: NURSE PRACTITIONER

## 2023-09-07 RX ORDER — ALBUTEROL SULFATE 90 UG/1
2 AEROSOL, METERED RESPIRATORY (INHALATION) 4 TIMES DAILY PRN
Qty: 18 G | Refills: 0 | Status: SHIPPED | OUTPATIENT
Start: 2023-09-07

## 2023-09-07 RX ORDER — AZITHROMYCIN 250 MG/1
250 TABLET, FILM COATED ORAL SEE ADMIN INSTRUCTIONS
Qty: 6 TABLET | Refills: 0 | Status: SHIPPED | OUTPATIENT
Start: 2023-09-07 | End: 2023-09-12

## 2023-09-07 SDOH — ECONOMIC STABILITY: TRANSPORTATION INSECURITY
IN THE PAST 12 MONTHS, HAS LACK OF TRANSPORTATION KEPT YOU FROM MEETINGS, WORK, OR FROM GETTING THINGS NEEDED FOR DAILY LIVING?: NO

## 2023-09-07 SDOH — ECONOMIC STABILITY: INCOME INSECURITY: HOW HARD IS IT FOR YOU TO PAY FOR THE VERY BASICS LIKE FOOD, HOUSING, MEDICAL CARE, AND HEATING?: VERY HARD

## 2023-09-07 SDOH — ECONOMIC STABILITY: FOOD INSECURITY: WITHIN THE PAST 12 MONTHS, YOU WORRIED THAT YOUR FOOD WOULD RUN OUT BEFORE YOU GOT MONEY TO BUY MORE.: SOMETIMES TRUE

## 2023-09-07 SDOH — ECONOMIC STABILITY: HOUSING INSECURITY
IN THE LAST 12 MONTHS, WAS THERE A TIME WHEN YOU DID NOT HAVE A STEADY PLACE TO SLEEP OR SLEPT IN A SHELTER (INCLUDING NOW)?: NO

## 2023-09-07 SDOH — ECONOMIC STABILITY: FOOD INSECURITY: WITHIN THE PAST 12 MONTHS, THE FOOD YOU BOUGHT JUST DIDN'T LAST AND YOU DIDN'T HAVE MONEY TO GET MORE.: SOMETIMES TRUE

## 2023-09-07 ASSESSMENT — ENCOUNTER SYMPTOMS
CHEST TIGHTNESS: 1
COUGH: 1
SHORTNESS OF BREATH: 0
SINUS PAIN: 0
WHEEZING: 0
SORE THROAT: 0

## 2023-09-27 DIAGNOSIS — F90.2 ATTENTION DEFICIT HYPERACTIVITY DISORDER (ADHD), COMBINED TYPE: ICD-10-CM

## 2023-09-28 RX ORDER — DEXTROAMPHETAMINE SACCHARATE, AMPHETAMINE ASPARTATE, DEXTROAMPHETAMINE SULFATE AND AMPHETAMINE SULFATE 5; 5; 5; 5 MG/1; MG/1; MG/1; MG/1
20 TABLET ORAL 2 TIMES DAILY
Qty: 60 TABLET | Refills: 0 | Status: SHIPPED | OUTPATIENT
Start: 2023-09-28 | End: 2023-10-25 | Stop reason: SDUPTHER

## 2023-10-25 DIAGNOSIS — F90.2 ATTENTION DEFICIT HYPERACTIVITY DISORDER (ADHD), COMBINED TYPE: ICD-10-CM

## 2023-10-26 RX ORDER — DEXTROAMPHETAMINE SACCHARATE, AMPHETAMINE ASPARTATE, DEXTROAMPHETAMINE SULFATE AND AMPHETAMINE SULFATE 5; 5; 5; 5 MG/1; MG/1; MG/1; MG/1
20 TABLET ORAL 2 TIMES DAILY
Qty: 60 TABLET | Refills: 0 | Status: SHIPPED | OUTPATIENT
Start: 2023-10-26 | End: 2023-11-20 | Stop reason: SDUPTHER

## 2023-10-26 NOTE — TELEPHONE ENCOUNTER
PCP: Tato Farris MD    Last appt: 9/7/2023       No future appointments. Requested Prescriptions     Pending Prescriptions Disp Refills    amphetamine-dextroamphetamine (ADDERALL) 20 MG tablet 60 tablet 0     Sig: Take 1 tablet by mouth 2 times daily for 30 days.  Max Daily Amount: 40 mg       Prior labs and Blood pressures:  BP Readings from Last 3 Encounters:   09/07/23 138/82   03/20/23 123/80   03/11/21 128/81     No results found for: \"NA\", \"K\", \"CL\", \"CO2\", \"AGAP\", \"GLU\", \"BUN\", \"CREA\", \"GFRAA\", \"CA\"  No results found for: \"HBA1C\", \"[id number]\"  No results found for: \"CHOL\", \"CHOLPOCT\", \"CHOLX\", \"CHLST\", \"CHOLV\", \"HDL\", \"HDLPOC\", \"HDLC\", \"LDL\", \"LDLC\", \"VLDLC\", \"VLDL\", \"TGLX\", \"TRIGL\"  No results found for: \"VITD3\", \"VD3RIA\"    No results found for: \"TSH\", \"TSH2\", \"TSH3\"

## 2023-11-20 DIAGNOSIS — F90.2 ATTENTION DEFICIT HYPERACTIVITY DISORDER (ADHD), COMBINED TYPE: ICD-10-CM

## 2023-11-20 NOTE — TELEPHONE ENCOUNTER
PCP: Barbara Mathew MD      No future appointments. Requested Prescriptions     Pending Prescriptions Disp Refills    amphetamine-dextroamphetamine (ADDERALL) 20 MG tablet 60 tablet 0     Sig: Take 1 tablet by mouth 2 times daily for 30 days.  Max Daily Amount: 40 mg      Last seen at 09/07/2023

## 2023-11-21 RX ORDER — DEXTROAMPHETAMINE SACCHARATE, AMPHETAMINE ASPARTATE, DEXTROAMPHETAMINE SULFATE AND AMPHETAMINE SULFATE 5; 5; 5; 5 MG/1; MG/1; MG/1; MG/1
20 TABLET ORAL 2 TIMES DAILY
Qty: 60 TABLET | Refills: 0 | Status: SHIPPED | OUTPATIENT
Start: 2023-11-21 | End: 2023-12-21

## 2023-12-27 DIAGNOSIS — F90.2 ATTENTION DEFICIT HYPERACTIVITY DISORDER (ADHD), COMBINED TYPE: ICD-10-CM

## 2023-12-27 RX ORDER — DEXTROAMPHETAMINE SACCHARATE, AMPHETAMINE ASPARTATE, DEXTROAMPHETAMINE SULFATE AND AMPHETAMINE SULFATE 5; 5; 5; 5 MG/1; MG/1; MG/1; MG/1
20 TABLET ORAL 2 TIMES DAILY
Qty: 60 TABLET | Refills: 0 | Status: SHIPPED | OUTPATIENT
Start: 2023-12-27 | End: 2024-01-26

## 2023-12-27 NOTE — TELEPHONE ENCOUNTER
PCP: Don Subramanian MD      No future appointments. Requested Prescriptions     Pending Prescriptions Disp Refills    amphetamine-dextroamphetamine (ADDERALL) 20 MG tablet 60 tablet 0     Sig: Take 1 tablet by mouth 2 times daily for 30 days.  Max Daily Amount: 40 mg      Last seen at 09/07/2023

## 2024-01-25 DIAGNOSIS — F90.2 ATTENTION DEFICIT HYPERACTIVITY DISORDER (ADHD), COMBINED TYPE: ICD-10-CM

## 2024-01-25 RX ORDER — DEXTROAMPHETAMINE SACCHARATE, AMPHETAMINE ASPARTATE, DEXTROAMPHETAMINE SULFATE AND AMPHETAMINE SULFATE 5; 5; 5; 5 MG/1; MG/1; MG/1; MG/1
20 TABLET ORAL 2 TIMES DAILY
Qty: 60 TABLET | Refills: 0 | OUTPATIENT
Start: 2024-01-25 | End: 2024-02-24

## 2024-01-26 RX ORDER — DEXTROAMPHETAMINE SACCHARATE, AMPHETAMINE ASPARTATE, DEXTROAMPHETAMINE SULFATE AND AMPHETAMINE SULFATE 5; 5; 5; 5 MG/1; MG/1; MG/1; MG/1
20 TABLET ORAL 2 TIMES DAILY
Qty: 60 TABLET | Refills: 0 | Status: SHIPPED | OUTPATIENT
Start: 2024-01-26 | End: 2024-02-25

## 2024-01-26 NOTE — TELEPHONE ENCOUNTER
Patient has scheduled OV for 2/5/24.      Patient noted she will need refill prior to OV.  Hoping NP [facility] will send for her.  Check .  Oliva Mark    Last appointment: 9/7/23 [facility]  Next appointment: 2/5/24 [facility]  Previous refill encounter(s): 12/27/23 60    Requested Prescriptions     Pending Prescriptions Disp Refills    amphetamine-dextroamphetamine (ADDERALL) 20 MG tablet 60 tablet 0     Sig: Take 1 tablet by mouth 2 times daily for 30 days. Appointment due. Max Daily Amount: 40 mg     Refused Prescriptions Disp Refills    amphetamine-dextroamphetamine (ADDERALL) 20 MG tablet 60 tablet 0     Sig: Take 1 tablet by mouth 2 times daily for 30 days. Appointment due. Max Daily Amount: 40 mg     Refused By: FAISAL THOMSON     Reason for Refusal: Other     For Pharmacy Admin Tracking Only    Program: Medication Refill  CPA in place:    Recommendation Provided To:   Intervention Detail: New Rx: 1, reason: Patient Preference  Intervention Accepted By:   [place]ap Closed?:    Time Spent (min): 5

## 2024-02-05 ENCOUNTER — TELEMEDICINE (OUTPATIENT)
Age: 34
End: 2024-02-05
Payer: COMMERCIAL

## 2024-02-05 DIAGNOSIS — F90.9 ATTENTION DEFICIT HYPERACTIVITY DISORDER (ADHD), UNSPECIFIED ADHD TYPE: Primary | ICD-10-CM

## 2024-02-05 DIAGNOSIS — G43.009 MIGRAINE WITHOUT AURA AND WITHOUT STATUS MIGRAINOSUS, NOT INTRACTABLE: ICD-10-CM

## 2024-02-05 PROCEDURE — 99213 OFFICE O/P EST LOW 20 MIN: CPT | Performed by: NURSE PRACTITIONER

## 2024-02-05 ASSESSMENT — PATIENT HEALTH QUESTIONNAIRE - PHQ9
1. LITTLE INTEREST OR PLEASURE IN DOING THINGS: 0
SUM OF ALL RESPONSES TO PHQ QUESTIONS 1-9: 0
SUM OF ALL RESPONSES TO PHQ9 QUESTIONS 1 & 2: 0
SUM OF ALL RESPONSES TO PHQ QUESTIONS 1-9: 0
SUM OF ALL RESPONSES TO PHQ QUESTIONS 1-9: 0
2. FEELING DOWN, DEPRESSED OR HOPELESS: 0
SUM OF ALL RESPONSES TO PHQ QUESTIONS 1-9: 0

## 2024-02-05 ASSESSMENT — ENCOUNTER SYMPTOMS
SHORTNESS OF BREATH: 0
CHEST TIGHTNESS: 0

## 2024-02-05 NOTE — PROGRESS NOTES
(and/or legal guardian's) consent. Patient identification was verified, and a caregiver was present when appropriate.   The patient was located at Home: [address] [place] [address] [address]  [facility] [address]  Provider was located at Facility (Appt Dept): [address] [place] [id number]        TEZ Veras NP

## 2024-02-26 DIAGNOSIS — F90.2 ATTENTION DEFICIT HYPERACTIVITY DISORDER (ADHD), COMBINED TYPE: ICD-10-CM

## 2024-02-26 RX ORDER — DEXTROAMPHETAMINE SACCHARATE, AMPHETAMINE ASPARTATE, DEXTROAMPHETAMINE SULFATE AND AMPHETAMINE SULFATE 5; 5; 5; 5 MG/1; MG/1; MG/1; MG/1
20 TABLET ORAL 2 TIMES DAILY
Qty: 60 TABLET | Refills: 0 | Status: SHIPPED | OUTPATIENT
Start: 2024-02-26 | End: 2024-03-27

## 2024-03-28 NOTE — TELEPHONE ENCOUNTER
Patient overdue for visit. Please call to schedule. Render In Strict Bullet Format?: No Detail Level: Zone Initiate Treatment: CLOBETASOL ointment Apply thin layer twice daily x 2 weeks per month as needed

## 2024-04-01 DIAGNOSIS — F90.2 ATTENTION DEFICIT HYPERACTIVITY DISORDER (ADHD), COMBINED TYPE: ICD-10-CM

## 2024-04-01 RX ORDER — DEXTROAMPHETAMINE SACCHARATE, AMPHETAMINE ASPARTATE, DEXTROAMPHETAMINE SULFATE AND AMPHETAMINE SULFATE 5; 5; 5; 5 MG/1; MG/1; MG/1; MG/1
20 TABLET ORAL 2 TIMES DAILY
Qty: 60 TABLET | Refills: 0 | Status: SHIPPED | OUTPATIENT
Start: 2024-04-01 | End: 2024-05-01

## 2024-04-29 DIAGNOSIS — F90.2 ATTENTION DEFICIT HYPERACTIVITY DISORDER (ADHD), COMBINED TYPE: ICD-10-CM

## 2024-04-30 RX ORDER — DEXTROAMPHETAMINE SACCHARATE, AMPHETAMINE ASPARTATE, DEXTROAMPHETAMINE SULFATE AND AMPHETAMINE SULFATE 5; 5; 5; 5 MG/1; MG/1; MG/1; MG/1
20 TABLET ORAL 2 TIMES DAILY
Qty: 60 TABLET | Refills: 0 | Status: SHIPPED | OUTPATIENT
Start: 2024-04-30 | End: 2024-05-30

## 2024-04-30 NOTE — TELEPHONE ENCOUNTER
PCP: Jesus Cobos MD      No future appointments.    Requested Prescriptions     Pending Prescriptions Disp Refills    amphetamine-dextroamphetamine (ADDERALL) 20 MG tablet 60 tablet 0     Sig: Take 1 tablet by mouth 2 times daily for 30 days. Max Daily Amount: 40 mg       Prior labs and Blood pressures:  BP Readings from Last 3 Encounters:   09/07/23 138/82   03/20/23 123/80   03/11/21 128/81     No results found for: \"NA\", \"K\", \"CL\", \"CO2\", \"AGAP\", \"GLU\", \"BUN\", \"GFRAA\", \"CA\"  No results found for: \"HBA1C\", \"[id number]\"  No results found for: \"CHOL\", \"CHOLPOCT\", \"CHOLX\", \"CHLST\", \"CHOLV\", \"HDL\", \"HDLPOC\", \"HDLC\", \"LDL\", \"LDLC\", \"VLDLC\", \"VLDL\", \"TGLX\", \"TRIGL\"  No results found for: \"VITD3\", \"VD3RIA\"    No results found for: \"TSH\", \"TSH2\", \"TSH3\"   LOV 02/05/2024

## 2024-06-06 DIAGNOSIS — F90.2 ATTENTION DEFICIT HYPERACTIVITY DISORDER (ADHD), COMBINED TYPE: ICD-10-CM

## 2024-06-07 RX ORDER — DEXTROAMPHETAMINE SACCHARATE, AMPHETAMINE ASPARTATE, DEXTROAMPHETAMINE SULFATE AND AMPHETAMINE SULFATE 5; 5; 5; 5 MG/1; MG/1; MG/1; MG/1
20 TABLET ORAL 2 TIMES DAILY
Qty: 60 TABLET | Refills: 0 | Status: SHIPPED | OUTPATIENT
Start: 2024-06-07 | End: 2024-07-07

## 2024-06-07 NOTE — TELEPHONE ENCOUNTER
PCP: Jesus Cobos MD      No future appointments.    Requested Prescriptions     Pending Prescriptions Disp Refills    amphetamine-dextroamphetamine (ADDERALL) 20 MG tablet 60 tablet 0     Sig: Take 1 tablet by mouth 2 times daily for 30 days. Max Daily Amount: 40 mg       Prior labs and Blood pressures:  BP Readings from Last 3 Encounters:   09/07/23 138/82   03/20/23 123/80   03/11/21 128/81     No results found for: \"NA\", \"K\", \"CL\", \"CO2\", \"GLU\", \"BUN\", \"GFRAA\"  No results found for: \"HBA1C\", \"XSI2SSIG\"  No results found for: \"CHOL\", \"CHOLPOCT\", \"CHLST\", \"CHOLV\", \"HDL\", \"HDLPOC\", \"HDLC\", \"LDL\", \"VLDLC\", \"VLDL\"  No results found for: \"VITD3\"    No results found for: \"TSH\", \"TSH2\", \"TSH3\"   LOV 02/05/2024

## 2024-07-24 DIAGNOSIS — F90.2 ATTENTION DEFICIT HYPERACTIVITY DISORDER (ADHD), COMBINED TYPE: ICD-10-CM

## 2024-07-25 RX ORDER — DEXTROAMPHETAMINE SACCHARATE, AMPHETAMINE ASPARTATE, DEXTROAMPHETAMINE SULFATE AND AMPHETAMINE SULFATE 5; 5; 5; 5 MG/1; MG/1; MG/1; MG/1
20 TABLET ORAL 2 TIMES DAILY
Qty: 60 TABLET | Refills: 0 | OUTPATIENT
Start: 2024-07-25 | End: 2024-08-24

## 2024-08-29 ENCOUNTER — OFFICE VISIT (OUTPATIENT)
Age: 34
End: 2024-08-29
Payer: COMMERCIAL

## 2024-08-29 VITALS
DIASTOLIC BLOOD PRESSURE: 66 MMHG | WEIGHT: 250.8 LBS | RESPIRATION RATE: 16 BRPM | HEART RATE: 88 BPM | TEMPERATURE: 98.2 F | HEIGHT: 64 IN | SYSTOLIC BLOOD PRESSURE: 106 MMHG | OXYGEN SATURATION: 96 % | BODY MASS INDEX: 42.82 KG/M2

## 2024-08-29 DIAGNOSIS — E66.01 OBESITY, CLASS III, BMI 40-49.9 (MORBID OBESITY) (HCC): ICD-10-CM

## 2024-08-29 DIAGNOSIS — G43.009 MIGRAINE WITHOUT AURA AND WITHOUT STATUS MIGRAINOSUS, NOT INTRACTABLE: ICD-10-CM

## 2024-08-29 DIAGNOSIS — F90.9 ATTENTION DEFICIT HYPERACTIVITY DISORDER (ADHD), UNSPECIFIED ADHD TYPE: Primary | ICD-10-CM

## 2024-08-29 DIAGNOSIS — Z51.81 ENCOUNTER FOR MEDICATION MONITORING: ICD-10-CM

## 2024-08-29 DIAGNOSIS — Z76.89 ENCOUNTER FOR WEIGHT MANAGEMENT: ICD-10-CM

## 2024-08-29 PROCEDURE — 99214 OFFICE O/P EST MOD 30 MIN: CPT | Performed by: NURSE PRACTITIONER

## 2024-08-29 RX ORDER — DEXTROAMPHETAMINE SACCHARATE, AMPHETAMINE ASPARTATE, DEXTROAMPHETAMINE SULFATE AND AMPHETAMINE SULFATE 5; 5; 5; 5 MG/1; MG/1; MG/1; MG/1
20 TABLET ORAL 2 TIMES DAILY
Qty: 60 TABLET | Refills: 0 | Status: SHIPPED | OUTPATIENT
Start: 2024-08-29 | End: 2024-09-28

## 2024-08-29 RX ORDER — TIRZEPATIDE 2.5 MG/.5ML
2.5 INJECTION, SOLUTION SUBCUTANEOUS WEEKLY
Qty: 2 ML | Refills: 0 | Status: SHIPPED | OUTPATIENT
Start: 2024-08-29

## 2024-08-29 ASSESSMENT — PATIENT HEALTH QUESTIONNAIRE - PHQ9
SUM OF ALL RESPONSES TO PHQ QUESTIONS 1-9: 0
SUM OF ALL RESPONSES TO PHQ QUESTIONS 1-9: 0
2. FEELING DOWN, DEPRESSED OR HOPELESS: NOT AT ALL
SUM OF ALL RESPONSES TO PHQ QUESTIONS 1-9: 0
SUM OF ALL RESPONSES TO PHQ QUESTIONS 1-9: 0
SUM OF ALL RESPONSES TO PHQ9 QUESTIONS 1 & 2: 0
1. LITTLE INTEREST OR PLEASURE IN DOING THINGS: NOT AT ALL

## 2024-08-29 ASSESSMENT — ENCOUNTER SYMPTOMS
SHORTNESS OF BREATH: 0
CHEST TIGHTNESS: 0

## 2024-08-29 NOTE — PROGRESS NOTES
Subjective:      Patient ID: Modesta Rosa is a 34 y.o. female     HPI  Follow up health problems, new issue.   ADHD.  Taking prescribed adderall.  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  Works varying hours, often on weekends, travels a lot. Medication helps with focus, organization and staying on task.  Reports no adverse side effects of medication.   Migraine without aura.  Uses fioricet prn with good symptom control.  Reports frequency has been stable.   Has been putting on weight.  Generally follows a healthy diet, does not exercise due to time constraints.  Would like medication for weight loss.      Patient Active Problem List   Diagnosis    Attention deficit hyperactivity disorder (ADHD)    EDMOND (generalized anxiety disorder)    Migraine without aura and without status migrainosus, not intractable    Obesity, morbid (HCC)     Current Outpatient Medications   Medication Sig    Multiple Vitamin (MULTIVITAMINS PO) Take by mouth    Tirzepatide-Weight Management (ZEPBOUND) 2.5 MG/0.5ML SOAJ Inject 2.5 mg into the skin once a week    amphetamine-dextroamphetamine (ADDERALL) 20 MG tablet Take 1 tablet by mouth 2 times daily for 30 days. Max Daily Amount: 40 mg    albuterol sulfate HFA (VENTOLIN HFA) 108 (90 Base) MCG/ACT inhaler Inhale 2 puffs into the lungs 4 times daily as needed for Wheezing    butalbital-acetaminophen-caffeine (FIORICET, ESGIC) -40 MG per tablet Take 1 tablet by mouth every 4 hours as needed    ibuprofen (ADVIL;MOTRIN) 800 MG tablet Take 1 tablet by mouth in the morning and 1 tablet at noon and 1 tablet in the evening.    levonorgestrel (MIRENA) IUD 52 mg 1 Device by IntraUTERine route once     No current facility-administered medications for this visit.     Social History     Tobacco Use    Smoking status: Former     Current packs/day: 0.00     Types: Cigarettes     Quit date: 2019     Years since quittin.8    Smokeless tobacco: Never    Tobacco

## 2024-08-29 NOTE — PROGRESS NOTES
Chief Complaint   Patient presents with    Medication Refill     Follow up     \"Have you been to the ER, urgent care clinic since your last visit?  Hospitalized since your last visit?\"    NO    “Have you seen or consulted any other health care providers outside of [facility] since your last visit?”    NO     “Have you had a pap smear?”    YES - Where: at [facility] Nurse/CMA to request most recent records if not in the chart    Date of last Cervical Cancer screen (HPV or PAP): 9/26/2016             Click Here for Release of Records Request             8/29/2024    11:01 AM   PHQ-9    Little interest or pleasure in doing things 0   Feeling down, depressed, or hopeless 0   PHQ-2 Score 0   PHQ-9 Total Score 0           Financial Resource Strain: High Risk (9/7/2023)    Overall Financial Resource Strain (CARDIA)     Difficulty of Paying Living Expenses: Very hard      Food Insecurity: Not on file (9/7/2023)   Recent Concern: Food Insecurity - Food Insecurity Present (9/7/2023)    Hunger Vital Sign     Worried About Running Out of Food in the Last Year: Sometimes true     Ran Out of Food in the Last Year: Sometimes true          Health Maintenance Due   Topic Date Due    COVID-19 Vaccine (1 - 2023-24 season) Never done    Cervical cancer screen  03/20/2024    Flu vaccine (1) 08/01/2024

## 2024-09-05 LAB — DRUGS UR: NORMAL

## 2024-09-28 DIAGNOSIS — F90.9 ATTENTION DEFICIT HYPERACTIVITY DISORDER (ADHD), UNSPECIFIED ADHD TYPE: ICD-10-CM

## 2024-09-30 RX ORDER — DEXTROAMPHETAMINE SACCHARATE, AMPHETAMINE ASPARTATE, DEXTROAMPHETAMINE SULFATE AND AMPHETAMINE SULFATE 5; 5; 5; 5 MG/1; MG/1; MG/1; MG/1
20 TABLET ORAL 2 TIMES DAILY
Qty: 60 TABLET | Refills: 0 | Status: SHIPPED | OUTPATIENT
Start: 2024-09-30 | End: 2024-10-30

## 2024-09-30 NOTE — TELEPHONE ENCOUNTER
PCP: Moni Doyle APRN - NP    Last Appt:8/29/2024    No future appointments.    Requested Prescriptions     Pending Prescriptions Disp Refills    amphetamine-dextroamphetamine (ADDERALL) 20 MG tablet 60 tablet 0     Sig: Take 1 tablet by mouth 2 times daily for 30 days. Max Daily Amount: 40 mg       Prior labs and Blood pressures:  BP Readings from Last 3 Encounters:   08/29/24 106/66   09/07/23 138/82   03/20/23 123/80     No results found for: \"NA\", \"K\", \"CL\", \"CO2\", \"GLU\", \"BUN\", \"GFRAA\"  No results found for: \"HBA1C\", \"[id number]\"  No results found for: \"CHOL\", \"CHOLPOCT\", \"CHLST\", \"CHOLV\", \"HDL\", \"HDLPOC\", \"HDLC\", \"LDL\", \"LDLC\", \"VLDLC\", \"VLDL\"  No results found for: \"VITD3\"    No results found for: \"TSH\", \"TSH2\", \"TSH3\"

## 2024-10-29 DIAGNOSIS — F90.9 ATTENTION DEFICIT HYPERACTIVITY DISORDER (ADHD), UNSPECIFIED ADHD TYPE: ICD-10-CM

## 2024-10-29 NOTE — TELEPHONE ENCOUNTER
PCP: Moni Doyle APRN - NP    Last appt: 8/29/2024   No future appointments.    Requested Prescriptions     Pending Prescriptions Disp Refills    amphetamine-dextroamphetamine (ADDERALL) 20 MG tablet 60 tablet 0     Sig: Take 1 tablet by mouth 2 times daily for 30 days. Max Daily Amount: 40 mg         Prior labs and Blood pressures:  BP Readings from Last 3 Encounters:   08/29/24 106/66   09/07/23 138/82   03/20/23 123/80     No results found for: \"NA\", \"K\", \"CL\", \"CO2\", \"GLU\", \"BUN\", \"GFRAA\"  No results found for: \"HBA1C\", \"[id number]\"  No results found for: \"CHOL\", \"CHOLPOCT\", \"CHLST\", \"CHOLV\", \"HDL\", \"HDLPOC\", \"HDLC\", \"LDL\", \"LDLC\", \"VLDLC\", \"VLDL\"  No results found for: \"VITD3\"    No results found for: \"TSH\", \"TSH2\", \"TSH3\"

## 2024-10-30 RX ORDER — DEXTROAMPHETAMINE SACCHARATE, AMPHETAMINE ASPARTATE, DEXTROAMPHETAMINE SULFATE AND AMPHETAMINE SULFATE 5; 5; 5; 5 MG/1; MG/1; MG/1; MG/1
20 TABLET ORAL 2 TIMES DAILY
Qty: 60 TABLET | Refills: 0 | Status: SHIPPED | OUTPATIENT
Start: 2024-10-30 | End: 2024-11-29

## 2024-12-02 DIAGNOSIS — F90.9 ATTENTION DEFICIT HYPERACTIVITY DISORDER (ADHD), UNSPECIFIED ADHD TYPE: ICD-10-CM

## 2024-12-03 RX ORDER — DEXTROAMPHETAMINE SACCHARATE, AMPHETAMINE ASPARTATE, DEXTROAMPHETAMINE SULFATE AND AMPHETAMINE SULFATE 5; 5; 5; 5 MG/1; MG/1; MG/1; MG/1
20 TABLET ORAL 2 TIMES DAILY
Qty: 60 TABLET | Refills: 0 | Status: SHIPPED | OUTPATIENT
Start: 2024-12-03 | End: 2025-01-02

## 2025-01-06 DIAGNOSIS — F90.9 ATTENTION DEFICIT HYPERACTIVITY DISORDER (ADHD), UNSPECIFIED ADHD TYPE: ICD-10-CM

## 2025-01-07 RX ORDER — DEXTROAMPHETAMINE SACCHARATE, AMPHETAMINE ASPARTATE, DEXTROAMPHETAMINE SULFATE AND AMPHETAMINE SULFATE 5; 5; 5; 5 MG/1; MG/1; MG/1; MG/1
20 TABLET ORAL 2 TIMES DAILY
Qty: 60 TABLET | Refills: 0 | OUTPATIENT
Start: 2025-01-07 | End: 2025-02-06

## 2025-01-14 DIAGNOSIS — F90.9 ATTENTION DEFICIT HYPERACTIVITY DISORDER (ADHD), UNSPECIFIED ADHD TYPE: ICD-10-CM

## 2025-01-15 RX ORDER — DEXTROAMPHETAMINE SACCHARATE, AMPHETAMINE ASPARTATE, DEXTROAMPHETAMINE SULFATE AND AMPHETAMINE SULFATE 5; 5; 5; 5 MG/1; MG/1; MG/1; MG/1
20 TABLET ORAL 2 TIMES DAILY
Qty: 60 TABLET | Refills: 0 | OUTPATIENT
Start: 2025-01-15 | End: 2025-02-14

## 2025-01-16 ENCOUNTER — TELEPHONE (OUTPATIENT)
Age: 35
End: 2025-01-16

## 2025-01-16 NOTE — TELEPHONE ENCOUNTER
Pt called stating that medication Adderall 20 mg is still not received. I state to pt that we are waiting on NP [place] to approve medication. Pt is asking for a called back from nurse when medication is ready.     BCBN [phone]

## 2025-01-17 NOTE — TELEPHONE ENCOUNTER
Left VM for pt to call office back. If pt calls back per NP Moni Doyle she need a 3 month follow up for adderall refill. Once she schedules an appt NP [place] will refill medications.-TM 1/17/25

## 2025-01-23 ENCOUNTER — OFFICE VISIT (OUTPATIENT)
Age: 35
End: 2025-01-23
Payer: COMMERCIAL

## 2025-01-23 VITALS
OXYGEN SATURATION: 97 % | RESPIRATION RATE: 16 BRPM | HEIGHT: 64 IN | HEART RATE: 83 BPM | SYSTOLIC BLOOD PRESSURE: 121 MMHG | TEMPERATURE: 99 F | WEIGHT: 255.2 LBS | BODY MASS INDEX: 43.57 KG/M2 | DIASTOLIC BLOOD PRESSURE: 80 MMHG

## 2025-01-23 DIAGNOSIS — L23.9 ALLERGIC DERMATITIS: ICD-10-CM

## 2025-01-23 DIAGNOSIS — Z76.89 ENCOUNTER FOR WEIGHT MANAGEMENT: ICD-10-CM

## 2025-01-23 DIAGNOSIS — F90.9 ATTENTION DEFICIT HYPERACTIVITY DISORDER (ADHD), UNSPECIFIED ADHD TYPE: Primary | ICD-10-CM

## 2025-01-23 DIAGNOSIS — E66.01 OBESITY, CLASS III, BMI 40-49.9 (MORBID OBESITY): ICD-10-CM

## 2025-01-23 PROCEDURE — 99214 OFFICE O/P EST MOD 30 MIN: CPT | Performed by: NURSE PRACTITIONER

## 2025-01-23 RX ORDER — TIRZEPATIDE 2.5 MG/.5ML
2.5 INJECTION, SOLUTION SUBCUTANEOUS WEEKLY
Qty: 2 ML | Refills: 0 | Status: SHIPPED | OUTPATIENT
Start: 2025-01-23

## 2025-01-23 RX ORDER — DEXTROAMPHETAMINE SACCHARATE, AMPHETAMINE ASPARTATE, DEXTROAMPHETAMINE SULFATE AND AMPHETAMINE SULFATE 5; 5; 5; 5 MG/1; MG/1; MG/1; MG/1
20 TABLET ORAL 2 TIMES DAILY
Qty: 60 TABLET | Refills: 0 | Status: SHIPPED | OUTPATIENT
Start: 2025-01-23 | End: 2025-02-22

## 2025-01-23 SDOH — ECONOMIC STABILITY: FOOD INSECURITY: WITHIN THE PAST 12 MONTHS, THE FOOD YOU BOUGHT JUST DIDN'T LAST AND YOU DIDN'T HAVE MONEY TO GET MORE.: PATIENT DECLINED

## 2025-01-23 SDOH — ECONOMIC STABILITY: INCOME INSECURITY: IN THE LAST 12 MONTHS, WAS THERE A TIME WHEN YOU WERE NOT ABLE TO PAY THE MORTGAGE OR RENT ON TIME?: PATIENT DECLINED

## 2025-01-23 SDOH — ECONOMIC STABILITY: FOOD INSECURITY: WITHIN THE PAST 12 MONTHS, YOU WORRIED THAT YOUR FOOD WOULD RUN OUT BEFORE YOU GOT MONEY TO BUY MORE.: SOMETIMES TRUE

## 2025-01-23 SDOH — ECONOMIC STABILITY: TRANSPORTATION INSECURITY
IN THE PAST 12 MONTHS, HAS LACK OF TRANSPORTATION KEPT YOU FROM MEETINGS, WORK, OR FROM GETTING THINGS NEEDED FOR DAILY LIVING?: PATIENT DECLINED

## 2025-01-23 SDOH — ECONOMIC STABILITY: TRANSPORTATION INSECURITY
IN THE PAST 12 MONTHS, HAS THE LACK OF TRANSPORTATION KEPT YOU FROM MEDICAL APPOINTMENTS OR FROM GETTING MEDICATIONS?: PATIENT DECLINED

## 2025-01-23 ASSESSMENT — PATIENT HEALTH QUESTIONNAIRE - PHQ9
SUM OF ALL RESPONSES TO PHQ QUESTIONS 1-9: 0
SUM OF ALL RESPONSES TO PHQ9 QUESTIONS 1 & 2: 0
SUM OF ALL RESPONSES TO PHQ QUESTIONS 1-9: 0
2. FEELING DOWN, DEPRESSED OR HOPELESS: NOT AT ALL
SUM OF ALL RESPONSES TO PHQ QUESTIONS 1-9: 0
SUM OF ALL RESPONSES TO PHQ QUESTIONS 1-9: 0
1. LITTLE INTEREST OR PLEASURE IN DOING THINGS: NOT AT ALL

## 2025-01-23 ASSESSMENT — ENCOUNTER SYMPTOMS
SHORTNESS OF BREATH: 0
CHEST TIGHTNESS: 0

## 2025-01-23 NOTE — PROGRESS NOTES
Chief Complaint   Patient presents with    Medication Refill    Other     Possible allergy     \"Have you been to the ER, urgent care clinic since your last visit?  Hospitalized since your last visit?\"    NO    “Have you seen or consulted any other health care providers outside of [facility] since your last visit?”    NO        “Have you had a pap smear?”    YES - Where: [facility] Nurse/NORBERTO to request most recent records if not in the chart    Date of last Cervical Cancer screen (HPV or PAP): 9/26/2016

## 2025-01-23 NOTE — PROGRESS NOTES
Subjective:      Patient ID: Modesta Rosa is a 34 y.o. female     HPI  Follow up ADHD, weight management and acute issue.    ADHD.  Taking prescribed adderall.  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  Works varying hours, often on weekends, travels a lot. Recently began another PT job with [employer]. Medication helps with focus, organization and staying on task.  Reports no adverse side effects of medication.   Obesity.  Was prescribed zepbound back in August.  PA approved.  Did not begin med.  States she was told by pharmacist it was approved.    C/o itching and redness of all finger tips.  Symptoms worsened by getting a manicure and using nail tips.  Has been applying hydrocortisone with some relief.  Has stopped manicures and using nail tips.      Patient Active Problem List   Diagnosis    Attention deficit hyperactivity disorder (ADHD)    EDMOND (generalized anxiety disorder)    Migraine without aura and without status migrainosus, not intractable    Obesity, morbid     Current Outpatient Medications   Medication Sig    Prenat Vit-Fe Fum-FA-Fish Oil (PRENATAL/OMEGA-3/FA/IRON PO) Take by mouth    amphetamine-dextroamphetamine (ADDERALL) 20 MG tablet Take 1 tablet by mouth 2 times daily for 30 days. Max Daily Amount: 40 mg    tirzepatide-weight management (ZEPBOUND) 2.5 MG/0.5ML SOAJ subCUTAneous auto-injector pen Inject 2.5 mg into the skin once a week    Multiple Vitamin (MULTIVITAMINS PO) Take by mouth    albuterol sulfate HFA (VENTOLIN HFA) 108 (90 Base) MCG/ACT inhaler Inhale 2 puffs into the lungs 4 times daily as needed for Wheezing    butalbital-acetaminophen-caffeine (FIORICET, ESGIC) -40 MG per tablet Take 1 tablet by mouth every 4 hours as needed    ibuprofen (ADVIL;MOTRIN) 800 MG tablet Take 1 tablet by mouth in the morning and 1 tablet at noon and 1 tablet in the evening.    levonorgestrel (MIRENA) IUD 52 mg 1 Device by IntraUTERine route once     No current

## 2025-02-20 DIAGNOSIS — E66.01 OBESITY, CLASS III, BMI 40-49.9 (MORBID OBESITY): ICD-10-CM

## 2025-02-20 DIAGNOSIS — E66.01 OBESITY, MORBID: Primary | ICD-10-CM

## 2025-02-20 DIAGNOSIS — F90.9 ATTENTION DEFICIT HYPERACTIVITY DISORDER (ADHD), UNSPECIFIED ADHD TYPE: ICD-10-CM

## 2025-02-20 DIAGNOSIS — Z76.89 ENCOUNTER FOR WEIGHT MANAGEMENT: ICD-10-CM

## 2025-02-20 RX ORDER — TIRZEPATIDE 2.5 MG/.5ML
2.5 INJECTION, SOLUTION SUBCUTANEOUS WEEKLY
Qty: 2 ML | Refills: 0 | OUTPATIENT
Start: 2025-02-20

## 2025-02-20 RX ORDER — DEXTROAMPHETAMINE SACCHARATE, AMPHETAMINE ASPARTATE, DEXTROAMPHETAMINE SULFATE AND AMPHETAMINE SULFATE 5; 5; 5; 5 MG/1; MG/1; MG/1; MG/1
20 TABLET ORAL 2 TIMES DAILY
Qty: 60 TABLET | Refills: 0 | Status: SHIPPED | OUTPATIENT
Start: 2025-02-20 | End: 2025-03-22

## 2025-03-19 DIAGNOSIS — F90.9 ATTENTION DEFICIT HYPERACTIVITY DISORDER (ADHD), UNSPECIFIED ADHD TYPE: ICD-10-CM

## 2025-03-19 DIAGNOSIS — E66.01 OBESITY, MORBID: ICD-10-CM

## 2025-03-19 NOTE — TELEPHONE ENCOUNTER
PCP: Moni Doyle APRN - NP    Last appt: 1/23/2025   No future appointments.    Requested Prescriptions     Pending Prescriptions Disp Refills    amphetamine-dextroamphetamine (ADDERALL) 20 MG tablet 60 tablet 0     Sig: Take 1 tablet by mouth 2 times daily for 30 days. Max Daily Amount: 40 mg    tirzepatide-weight management (ZEPBOUND) 5 MG/0.5ML SOAJ subCUTAneous auto-injector pen 2 mL 0     Sig: Inject 5 mg into the skin once a week

## 2025-03-21 ENCOUNTER — TELEPHONE (OUTPATIENT)
Age: 35
End: 2025-03-21

## 2025-03-21 NOTE — TELEPHONE ENCOUNTER
Pt messaged me back thru the portal stating that she was just seen on 1/23/25,and doesn't understand why she already need's a follow-up Appt.Considering NP Yanira's note's state that she didn't need to be seen until April.

## 2025-03-21 NOTE — TELEPHONE ENCOUNTER
Pt called regarding ZEPBOUND and ADDERALL informed PT both meds were sent to pharm 2/20/25  KS-3.21.25

## 2025-03-23 RX ORDER — DEXTROAMPHETAMINE SACCHARATE, AMPHETAMINE ASPARTATE, DEXTROAMPHETAMINE SULFATE AND AMPHETAMINE SULFATE 5; 5; 5; 5 MG/1; MG/1; MG/1; MG/1
20 TABLET ORAL 2 TIMES DAILY
Qty: 60 TABLET | Refills: 0 | Status: SHIPPED | OUTPATIENT
Start: 2025-03-23 | End: 2025-04-22

## 2025-05-05 ENCOUNTER — OFFICE VISIT (OUTPATIENT)
Age: 35
End: 2025-05-05
Payer: COMMERCIAL

## 2025-05-05 VITALS
HEART RATE: 89 BPM | HEIGHT: 64 IN | DIASTOLIC BLOOD PRESSURE: 71 MMHG | RESPIRATION RATE: 18 BRPM | TEMPERATURE: 98.6 F | SYSTOLIC BLOOD PRESSURE: 105 MMHG | WEIGHT: 242.4 LBS | BODY MASS INDEX: 41.38 KG/M2

## 2025-05-05 DIAGNOSIS — E66.01 OBESITY, MORBID (HCC): ICD-10-CM

## 2025-05-05 DIAGNOSIS — F90.9 ATTENTION DEFICIT HYPERACTIVITY DISORDER (ADHD), UNSPECIFIED ADHD TYPE: Primary | ICD-10-CM

## 2025-05-05 DIAGNOSIS — Z76.89 ENCOUNTER FOR WEIGHT MANAGEMENT: ICD-10-CM

## 2025-05-05 PROCEDURE — 99213 OFFICE O/P EST LOW 20 MIN: CPT | Performed by: NURSE PRACTITIONER

## 2025-05-05 RX ORDER — DEXTROAMPHETAMINE SACCHARATE, AMPHETAMINE ASPARTATE, DEXTROAMPHETAMINE SULFATE AND AMPHETAMINE SULFATE 5; 5; 5; 5 MG/1; MG/1; MG/1; MG/1
20 TABLET ORAL 2 TIMES DAILY
Qty: 60 TABLET | Refills: 0 | Status: SHIPPED | OUTPATIENT
Start: 2025-05-05 | End: 2025-06-04

## 2025-05-05 ASSESSMENT — ENCOUNTER SYMPTOMS
ABDOMINAL PAIN: 0
CHEST TIGHTNESS: 0
BLOOD IN STOOL: 0
SHORTNESS OF BREATH: 0
CONSTIPATION: 0
NAUSEA: 0

## 2025-05-05 NOTE — PROGRESS NOTES
Chief Complaint   Patient presents with    Medication Refill     Adderall and Zepbound         \"Have you been to the ER, urgent care clinic since your last visit?  Hospitalized since your last visit?\"    NO    “Have you seen or consulted any other health care providers outside of [facility] since your last visit?”    NO     “Have you had a pap smear?”    YES - Where: 2024, [facility] [place], [place] Nurse/CMA to request most recent records if not in the chart    Date of last Cervical Cancer screen (HPV or PAP): 9/26/2016             Click Here for Release of Records Request           1/23/2025     2:38 PM   PHQ-9    Little interest or pleasure in doing things 0   Feeling down, depressed, or hopeless 0   PHQ-2 Score 0   PHQ-9 Total Score 0           Financial Resource Strain: High Risk (9/7/2023)    Overall Financial Resource Strain (CARDIA)     Difficulty of Paying Living Expenses: Very hard      Food Insecurity: Food Insecurity Present (1/23/2025)    Hunger Vital Sign     Worried About Running Out of Food in the Last Year: Sometimes true     Ran Out of Food in the Last Year: Patient declined          Health Maintenance Due   Topic Date Due    Varicella vaccine (1 of 2 - 13+ 2-dose series) Never done    Hepatitis B vaccine (2 of 3 - 19+ 3-dose series) 05/29/2018    Cervical cancer screen  03/20/2024    COVID-19 Vaccine (1 - 2024-25 season) Never done    Diabetes screen  Never done

## 2025-05-05 NOTE — PROGRESS NOTES
Subjective:      Patient ID: Modesta Rosa is a 35 y.o. female     HPI  Follow up health issues.   ADHD.  Taking prescribed adderall.  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  Works varying hours, often on weekends, travels a lot. Recently began another PT job with [employer]. Medication helps with focus, organization and staying on task.  Reports no adverse side effects of medication.   Obesity on zepbound.  Titrated to 5mg dose. RX ran out 3 weeks ago.  Did not obtain refill.  Had some mild constipation on med, resolved with miralax.  Has lost 13 lb since beginning med.     Patient Active Problem List   Diagnosis    Attention deficit hyperactivity disorder (ADHD)    EDMOND (generalized anxiety disorder)    Migraine without aura and without status migrainosus, not intractable    Obesity, morbid (HCC)     Current Outpatient Medications   Medication Sig    tirzepatide-weight management (ZEPBOUND) 5 MG/0.5ML SOAJ subCUTAneous auto-injector pen Inject 5 mg into the skin every 7 days    amphetamine-dextroamphetamine (ADDERALL) 20 MG tablet Take 1 tablet by mouth 2 times daily for 30 days. Max Daily Amount: 40 mg    Prenat Vit-Fe Fum-FA-Fish Oil (PRENATAL/OMEGA-3/FA/IRON PO) Take by mouth    Multiple Vitamin (MULTIVITAMINS PO) Take by mouth    albuterol sulfate HFA (VENTOLIN HFA) 108 (90 Base) MCG/ACT inhaler Inhale 2 puffs into the lungs 4 times daily as needed for Wheezing    butalbital-acetaminophen-caffeine (FIORICET, ESGIC) -40 MG per tablet Take 1 tablet by mouth every 4 hours as needed    ibuprofen (ADVIL;MOTRIN) 800 MG tablet Take 1 tablet by mouth in the morning and 1 tablet at noon and 1 tablet in the evening.    levonorgestrel (MIRENA) IUD 52 mg 1 Device by IntraUTERine route once     No current facility-administered medications for this visit.     Social History     Tobacco Use    Smoking status: Former     Current packs/day: 0.00     Types: Cigarettes     Quit date: 11/11/2019     Years

## 2025-05-05 NOTE — ASSESSMENT & PLAN NOTE
Tolerating zepbound.  Resume at 5mg dose and titrate as discussed.  Medication risks, benefits and potential side effects were reviewed.   Orders:    tirzepatide-weight management (ZEPBOUND) 5 MG/0.5ML SOAJ subCUTAneous auto-injector pen; Inject 5 mg into the skin every 7 days

## 2025-05-05 NOTE — ASSESSMENT & PLAN NOTE
Stable on adderall.  PDMP reviewed and appropriate.  UTD with UDS and CSA in chart.    Orders:    amphetamine-dextroamphetamine (ADDERALL) 20 MG tablet; Take 1 tablet by mouth 2 times daily for 30 days. Max Daily Amount: 40 mg

## 2025-05-07 ENCOUNTER — TELEPHONE (OUTPATIENT)
Age: 35
End: 2025-05-07

## 2025-05-07 DIAGNOSIS — Z76.89 ENCOUNTER FOR WEIGHT MANAGEMENT: ICD-10-CM

## 2025-05-07 DIAGNOSIS — E66.01 OBESITY, MORBID (HCC): ICD-10-CM

## 2025-05-08 NOTE — TELEPHONE ENCOUNTER
Patient mychart message that Zepbound requires PA.    Please call for Prior Auth per patient request.  ThanksOliva

## 2025-06-05 DIAGNOSIS — F90.9 ATTENTION DEFICIT HYPERACTIVITY DISORDER (ADHD), UNSPECIFIED ADHD TYPE: ICD-10-CM

## 2025-06-06 RX ORDER — DEXTROAMPHETAMINE SACCHARATE, AMPHETAMINE ASPARTATE, DEXTROAMPHETAMINE SULFATE AND AMPHETAMINE SULFATE 5; 5; 5; 5 MG/1; MG/1; MG/1; MG/1
20 TABLET ORAL 2 TIMES DAILY
Qty: 60 TABLET | Refills: 0 | Status: SHIPPED | OUTPATIENT
Start: 2025-06-06 | End: 2025-07-06

## 2025-06-06 NOTE — TELEPHONE ENCOUNTER
CSA:  8/30/2024  CDA:  8/29/2024    PCP: Moni Doyle APRN - NP    Last appt: 5/5/2025     No future appointments.    Requested Prescriptions     Pending Prescriptions Disp Refills    amphetamine-dextroamphetamine (ADDERALL) 20 MG tablet 60 tablet 0     Sig: Take 1 tablet by mouth 2 times daily for 30 days. Max Daily Amount: 40 mg       Prior labs and Blood pressures:  BP Readings from Last 3 Encounters:   05/05/25 105/71   01/23/25 121/80   08/29/24 106/66     No results found for: \"NA\", \"K\", \"CL\", \"CO2\", \"GLU\", \"BUN\", \"GFRAA\"  No results found for: \"HBA1C\", \"[id number]\"  No results found for: \"CHOL\", \"CHOLPOCT\", \"CHLST\", \"CHOLV\", \"HDL\", \"HDLPOC\", \"HDLC\", \"LDL\", \"LDLC\", \"VLDLC\", \"VLDL\"  No results found for: \"VITD3\"    No results found for: \"TSH\", \"TSH2\", \"TSH3\"

## 2025-06-09 DIAGNOSIS — E66.01 OBESITY, MORBID (HCC): ICD-10-CM

## 2025-06-09 DIAGNOSIS — Z76.89 ENCOUNTER FOR WEIGHT MANAGEMENT: ICD-10-CM

## 2025-06-10 NOTE — TELEPHONE ENCOUNTER
PCP: Moni Doyle APRN - NP    Last appt: 5/5/2025     No future appointments.    Requested Prescriptions     Pending Prescriptions Disp Refills    tirzepatide-weight management (ZEPBOUND) 5 MG/0.5ML SOAJ subCUTAneous auto-injector pen 2 mL 0     Sig: Inject 5 mg into the skin every 7 days       Prior labs and Blood pressures:  BP Readings from Last 3 Encounters:   05/05/25 105/71   01/23/25 121/80   08/29/24 106/66     No results found for: \"NA\", \"K\", \"CL\", \"CO2\", \"GLU\", \"BUN\", \"GFRAA\"  No results found for: \"HBA1C\", \"[id number]\"  No results found for: \"CHOL\", \"CHOLPOCT\", \"CHLST\", \"CHOLV\", \"HDL\", \"HDLPOC\", \"HDLC\", \"LDL\", \"LDLC\", \"VLDLC\", \"VLDL\"  No results found for: \"VITD3\"    No results found for: \"TSH\", \"TSH2\", \"TSH3\"

## 2025-07-03 DIAGNOSIS — F90.9 ATTENTION DEFICIT HYPERACTIVITY DISORDER (ADHD), UNSPECIFIED ADHD TYPE: ICD-10-CM

## 2025-07-03 DIAGNOSIS — E66.01 OBESITY, MORBID (HCC): ICD-10-CM

## 2025-07-03 DIAGNOSIS — Z76.89 ENCOUNTER FOR WEIGHT MANAGEMENT: ICD-10-CM

## 2025-07-03 RX ORDER — DEXTROAMPHETAMINE SACCHARATE, AMPHETAMINE ASPARTATE, DEXTROAMPHETAMINE SULFATE AND AMPHETAMINE SULFATE 5; 5; 5; 5 MG/1; MG/1; MG/1; MG/1
20 TABLET ORAL 2 TIMES DAILY
Qty: 60 TABLET | Refills: 0 | Status: SHIPPED | OUTPATIENT
Start: 2025-07-03 | End: 2025-08-02

## 2025-07-17 ENCOUNTER — TELEPHONE (OUTPATIENT)
Age: 35
End: 2025-07-17

## 2025-07-18 ENCOUNTER — TELEPHONE (OUTPATIENT)
Age: 35
End: 2025-07-18

## 2025-07-20 DIAGNOSIS — E66.01 OBESITY, MORBID (HCC): Primary | ICD-10-CM

## 2025-08-14 DIAGNOSIS — E66.01 OBESITY, MORBID (HCC): Primary | ICD-10-CM

## 2025-08-14 DIAGNOSIS — F90.9 ATTENTION DEFICIT HYPERACTIVITY DISORDER (ADHD), UNSPECIFIED ADHD TYPE: ICD-10-CM

## 2025-08-14 DIAGNOSIS — E66.01 OBESITY, MORBID (HCC): ICD-10-CM

## 2025-08-14 RX ORDER — DEXTROAMPHETAMINE SACCHARATE, AMPHETAMINE ASPARTATE, DEXTROAMPHETAMINE SULFATE AND AMPHETAMINE SULFATE 5; 5; 5; 5 MG/1; MG/1; MG/1; MG/1
20 TABLET ORAL 2 TIMES DAILY
Qty: 60 TABLET | Refills: 0 | Status: SHIPPED | OUTPATIENT
Start: 2025-08-14 | End: 2025-09-13

## (undated) DEVICE — SOLUTION IV 1000ML 0.9% SOD CHL

## (undated) DEVICE — ROYALSILK SURGICAL GOWN, L: Brand: CONVERTORS

## (undated) DEVICE — SOLIDIFIER MEDC 1200ML -- CONVERT TO 356117

## (undated) DEVICE — STAPLER SKIN 10.2X3.25MM OPN 4.8X3.4MM CLSR 0.51MM G S STL

## (undated) DEVICE — SUTURE MCRYL SZ 4-0 L27IN ABSRB UD L24MM PS-1 3/8 CIR PRIM Y935H

## (undated) DEVICE — PACK PROCEDURE SURG C SECT KT SMH

## (undated) DEVICE — SOLUTION IRRIG 1000ML H2O STRL BLT

## (undated) DEVICE — SUTURE VCRL SZ 1 L36IN ABSRB VLT L36MM CT-1 1/2 CIR J347H

## (undated) DEVICE — ROYAL SILK SURGICAL GOWN, XXL: Brand: CONVERTORS

## (undated) DEVICE — (D)PREP SKN CHLRAPRP APPL 26ML -- CONVERT TO ITEM 371833

## (undated) DEVICE — SUTURE CHROMIC GUT SZ 0 L36IN ABSRB BRN CTX L48MM 1/2 CIR 904H

## (undated) DEVICE — SUTURE PLN GUT SZ 3-0 L27IN ABSRB YELLOWISH TAN L70MM XLH 52T

## (undated) DEVICE — KENDALL SCD EXPRESS SLEEVES, KNEE LENGTH, MEDIUM: Brand: KENDALL SCD

## (undated) DEVICE — SUTURE VCRL SZ 0 L36IN ABSRB VLT L40MM CT 1/2 CIR J358H

## (undated) DEVICE — ANESTHESIA TRAY SPNL W/O NDL PENCAN

## (undated) DEVICE — SPONGE: LAP 18X18 W  200/CS: Brand: MEDICAL ACTION INDUSTRIES

## (undated) DEVICE — SUTURE VCRL SZ 2-0 L36IN ABSRB VLT L36MM CT-1 1/2 CIR J345H

## (undated) DEVICE — COVERALL PREM SMS 2XL KNIT --

## (undated) DEVICE — STERILE POLYISOPRENE POWDER-FREE SURGICAL GLOVES: Brand: PROTEXIS

## (undated) DEVICE — LIGHT HANDLE: Brand: DEVON

## (undated) DEVICE — DEVON™ KNEE AND BODY STRAP 60" X 3" (1.5 M X 7.6 CM): Brand: DEVON

## (undated) DEVICE — STERILE POLYISOPRENE POWDER-FREE SURGICAL GLOVES WITH EMOLLIENT COATING: Brand: PROTEXIS

## (undated) DEVICE — REM POLYHESIVE ADULT PATIENT RETURN ELECTRODE: Brand: VALLEYLAB

## (undated) DEVICE — POOLE SUCTION INSTRUMENT WITH REMOVABLE SHEATH: Brand: POOLE

## (undated) DEVICE — 3000CC GUARDIAN II: Brand: GUARDIAN